# Patient Record
Sex: MALE | Race: BLACK OR AFRICAN AMERICAN | NOT HISPANIC OR LATINO | Employment: OTHER | URBAN - METROPOLITAN AREA
[De-identification: names, ages, dates, MRNs, and addresses within clinical notes are randomized per-mention and may not be internally consistent; named-entity substitution may affect disease eponyms.]

---

## 2019-01-16 ENCOUNTER — HOSPITAL ENCOUNTER (INPATIENT)
Facility: HOSPITAL | Age: 29
LOS: 5 days | Discharge: NURSING, REHAB OR LONG-TERM CARE | DRG: 439 | End: 2019-01-21
Attending: INTERNAL MEDICINE | Admitting: INTERNAL MEDICINE
Payer: MEDICARE

## 2019-01-16 DIAGNOSIS — K80.50 COMMON BILE DUCT STONE: Primary | ICD-10-CM

## 2019-01-16 DIAGNOSIS — K85.90 ACUTE PANCREATITIS: ICD-10-CM

## 2019-01-16 PROBLEM — E83.111 IRON OVERLOAD DUE TO REPEATED RED BLOOD CELL TRANSFUSIONS: Status: ACTIVE | Noted: 2019-01-16

## 2019-01-16 PROBLEM — D57.1 SICKLE CELL DISEASE WITHOUT CRISIS: Status: ACTIVE | Noted: 2019-01-16

## 2019-01-16 PROBLEM — M1A.09X0 CHRONIC GOUT OF MULTIPLE SITES: Status: ACTIVE | Noted: 2019-01-16

## 2019-01-16 PROBLEM — N17.9 ACUTE RENAL FAILURE SUPERIMPOSED ON STAGE 3 CHRONIC KIDNEY DISEASE: Status: ACTIVE | Noted: 2019-01-16

## 2019-01-16 PROBLEM — N18.30 ACUTE RENAL FAILURE SUPERIMPOSED ON STAGE 3 CHRONIC KIDNEY DISEASE: Status: ACTIVE | Noted: 2019-01-16

## 2019-01-16 PROCEDURE — 11000001 HC ACUTE MED/SURG PRIVATE ROOM

## 2019-01-16 NOTE — PLAN OF CARE
[facility] [employer] [facility] [employer] admissions ONLY: Please call extension [phone] upon patient arrival to floor for Hospital Medicine admit team assignment and for additional admit orders for the patient. Do not page the attending, staff physician or Advanced Practice Provider with the patient on arrival (may not be in-house at the time of arrival). Call back or wait to leave beeper number when prompted.    Outside Transfer Acceptance Note       Patients name/MRN: Ambrosio Newman/MRN [id number]     Referring Physician or Mid-Level provider/Clinic giving report: Dr. Cuauhtemoc Posada (Hospital Medicine), Phone number [phone]    Referral Facility: [facility]    Date of Acceptance: 1/16/2019    Code status: Full code     Accepting Physician for admission to hospital: Carlotta Laurent MD    Consulting Physicians from [facility] involved in case: Dr. Dickson Coto ([facility]) [facility]    Reason for transfer request: Needs ERCP; Failed ERCP at outside facility     Report from Physician/Mid-Level Provider/HPI: 29 y/o male with sickle cell anemia, CKD stage 3 (baseline Cr 2.5-2.6), gout and iron overload admitted on 1/13/2019 with acute pancreatitis with grossly elevated lipase and LFTs and LAURENCE with Cr 4. Patient started on IVFs and currently on LR at 125 cc/hr and IV Zosyn. CT scan showed gallstones in CBD and acute pancreatitis but I was told no necrosis noted on MRCP showed CBD stone. ERCP was done yesterday on 1/5 but unable to cannulate CBD so unsuccessful. Nephrology following patient as Cr not improving and up to 7 today so decided to start HD today and temporary line placed and patient currently receiving first dialysis. Patient never on dialysis before. Patient is otherwise clinically stable. Dr. Coto from [facility] consulted and okay for transfer for [facility] and felt [facility] was best campus for patient. Patient has very high T. brie so concern about if all related to CBD obstruction vs. related to his  Sickle cell disease. Patient stable on med/surg floor. Patient NPO and still having epigastric pain related to pancreatitis.     VS: BP: 110/63 P: 93 T: 97.9 F  RR: 16  Pulse Ox: 93% room air; 69.3 kg    Past Medical History: Sickle Cell anemia, CKD Stage 3, Iron overload related to frequent blood transfusions for SS disease, gout    LABS:         T. brie 36.6  Plt 197  INR 1.2  Cr 7.0  Hgb 8.3 and reported as stable over past 3 days    Imaging: MRCP as per Dr. Posada showed CBD stone but no acute cholecystitis    To Do List upon arrival:     Consult AES for ERCP   NPO   Pain control   Consult Nephrology for dialysis needs    May need Heme/Onc if bilirubin does not improve with relieving CBD obstruction    Carlotta Laurent MD  Department of Hospital Medicine  [facility]/   [phone]

## 2019-01-17 ENCOUNTER — ANESTHESIA (OUTPATIENT)
Dept: ENDOSCOPY | Facility: HOSPITAL | Age: 29
DRG: 439 | End: 2019-01-17
Payer: MEDICARE

## 2019-01-17 ENCOUNTER — ANESTHESIA EVENT (OUTPATIENT)
Dept: ENDOSCOPY | Facility: HOSPITAL | Age: 29
DRG: 439 | End: 2019-01-17
Payer: MEDICARE

## 2019-01-17 PROBLEM — N18.4 ACUTE RENAL FAILURE SUPERIMPOSED ON STAGE 4 CHRONIC KIDNEY DISEASE: Status: ACTIVE | Noted: 2019-01-16

## 2019-01-17 LAB
ALBUMIN SERPL BCP-MCNC: 2.3 G/DL
ALBUMIN SERPL BCP-MCNC: 2.4 G/DL
ALBUMIN SERPL BCP-MCNC: 2.6 G/DL
ALP SERPL-CCNC: 392 U/L
ALP SERPL-CCNC: 397 U/L
ALT SERPL W/O P-5'-P-CCNC: 123 U/L
ALT SERPL W/O P-5'-P-CCNC: 133 U/L
ANION GAP SERPL CALC-SCNC: 14 MMOL/L
ANION GAP SERPL CALC-SCNC: 18 MMOL/L
ANISOCYTOSIS BLD QL SMEAR: ABNORMAL
ANISOCYTOSIS BLD QL SMEAR: SLIGHT
AST SERPL-CCNC: 141 U/L
AST SERPL-CCNC: 144 U/L
BASO STIPL BLD QL SMEAR: ABNORMAL
BASOPHILS # BLD AUTO: 0.12 K/UL
BASOPHILS # BLD AUTO: 0.13 K/UL
BASOPHILS NFR BLD: 0.5 %
BASOPHILS NFR BLD: 0.6 %
BILIRUB DIRECT SERPL-MCNC: >14 MG/DL
BILIRUB DIRECT SERPL-MCNC: >14 MG/DL
BILIRUB SERPL-MCNC: 46.3 MG/DL
BILIRUB SERPL-MCNC: 46.5 MG/DL
BUN SERPL-MCNC: 45 MG/DL
BUN SERPL-MCNC: 53 MG/DL
CALCIUM SERPL-MCNC: 9.4 MG/DL
CALCIUM SERPL-MCNC: 9.5 MG/DL
CHLORIDE SERPL-SCNC: 102 MMOL/L
CHLORIDE SERPL-SCNC: 99 MMOL/L
CO2 SERPL-SCNC: 18 MMOL/L
CO2 SERPL-SCNC: 22 MMOL/L
CREAT SERPL-MCNC: 4.7 MG/DL
CREAT SERPL-MCNC: 5.7 MG/DL
DIFFERENTIAL METHOD: ABNORMAL
DIFFERENTIAL METHOD: ABNORMAL
EOSINOPHIL # BLD AUTO: 0.5 K/UL
EOSINOPHIL # BLD AUTO: 0.6 K/UL
EOSINOPHIL NFR BLD: 2.2 %
EOSINOPHIL NFR BLD: 2.6 %
ERYTHROCYTE [DISTWIDTH] IN BLOOD BY AUTOMATED COUNT: 19.3 %
ERYTHROCYTE [DISTWIDTH] IN BLOOD BY AUTOMATED COUNT: 19.5 %
EST. GFR  (AFRICAN AMERICAN): 14.4 ML/MIN/1.73 M^2
EST. GFR  (AFRICAN AMERICAN): 18.2 ML/MIN/1.73 M^2
EST. GFR  (NON AFRICAN AMERICAN): 12.4 ML/MIN/1.73 M^2
EST. GFR  (NON AFRICAN AMERICAN): 15.7 ML/MIN/1.73 M^2
ESTIMATED AVG GLUCOSE: 94 MG/DL
GLUCOSE SERPL-MCNC: 86 MG/DL
GLUCOSE SERPL-MCNC: 97 MG/DL
HAPTOGLOB SERPL-MCNC: <10 MG/DL
HBA1C MFR BLD HPLC: 4.9 %
HCT VFR BLD AUTO: 20.5 %
HCT VFR BLD AUTO: 21.5 %
HGB BLD-MCNC: 7.3 G/DL
HGB BLD-MCNC: 7.9 G/DL
HOWELL-JOLLY BOD BLD QL SMEAR: ABNORMAL
HYPOCHROMIA BLD QL SMEAR: ABNORMAL
HYPOCHROMIA BLD QL SMEAR: ABNORMAL
IMM GRANULOCYTES # BLD AUTO: 0.21 K/UL
IMM GRANULOCYTES # BLD AUTO: 0.21 K/UL
IMM GRANULOCYTES NFR BLD AUTO: 0.9 %
IMM GRANULOCYTES NFR BLD AUTO: 0.9 %
INR PPP: 1.2
LACTATE SERPL-SCNC: 0.5 MMOL/L
LDH SERPL L TO P-CCNC: 447 U/L
LIPASE SERPL-CCNC: 57 U/L
LYMPHOCYTES # BLD AUTO: 1 K/UL
LYMPHOCYTES # BLD AUTO: 1 K/UL
LYMPHOCYTES NFR BLD: 4.1 %
LYMPHOCYTES NFR BLD: 4.3 %
MAGNESIUM SERPL-MCNC: 1.4 MG/DL
MCH RBC QN AUTO: 28.2 PG
MCH RBC QN AUTO: 29 PG
MCHC RBC AUTO-ENTMCNC: 35.6 G/DL
MCHC RBC AUTO-ENTMCNC: 36.7 G/DL
MCV RBC AUTO: 79 FL
MCV RBC AUTO: 79 FL
MONOCYTES # BLD AUTO: 3.3 K/UL
MONOCYTES # BLD AUTO: 3.6 K/UL
MONOCYTES NFR BLD: 14 %
MONOCYTES NFR BLD: 15.7 %
NEUTROPHILS # BLD AUTO: 17.6 K/UL
NEUTROPHILS # BLD AUTO: 18.4 K/UL
NEUTROPHILS NFR BLD: 76.3 %
NEUTROPHILS NFR BLD: 77.9 %
NRBC BLD-RTO: 3 /100 WBC
NRBC BLD-RTO: 3 /100 WBC
OVALOCYTES BLD QL SMEAR: ABNORMAL
PAPPENHEIMER BOD BLD QL SMEAR: PRESENT
PAPPENHEIMER BOD BLD QL SMEAR: PRESENT
PHOSPHATE SERPL-MCNC: 3.9 MG/DL
PHOSPHATE SERPL-MCNC: 4.4 MG/DL
PLATELET # BLD AUTO: 208 K/UL
PLATELET # BLD AUTO: 233 K/UL
PLATELET BLD QL SMEAR: ABNORMAL
PMV BLD AUTO: 10.8 FL
PMV BLD AUTO: 11.1 FL
POIKILOCYTOSIS BLD QL SMEAR: ABNORMAL
POIKILOCYTOSIS BLD QL SMEAR: ABNORMAL
POLYCHROMASIA BLD QL SMEAR: ABNORMAL
POLYCHROMASIA BLD QL SMEAR: ABNORMAL
POTASSIUM SERPL-SCNC: 4.8 MMOL/L
POTASSIUM SERPL-SCNC: 4.9 MMOL/L
PROT SERPL-MCNC: 5.6 G/DL
PROT SERPL-MCNC: 6 G/DL
PROTHROMBIN TIME: 11.9 SEC
RBC # BLD AUTO: 2.59 M/UL
RBC # BLD AUTO: 2.72 M/UL
RETICS/RBC NFR AUTO: 5 %
SCHISTOCYTES BLD QL SMEAR: ABNORMAL
SCHISTOCYTES BLD QL SMEAR: PRESENT
SICKLE CELLS BLD QL SMEAR: ABNORMAL
SICKLE CELLS BLD QL SMEAR: ABNORMAL
SODIUM SERPL-SCNC: 135 MMOL/L
SODIUM SERPL-SCNC: 138 MMOL/L
SPHEROCYTES BLD QL SMEAR: ABNORMAL
TARGETS BLD QL SMEAR: ABNORMAL
TARGETS BLD QL SMEAR: ABNORMAL
WBC # BLD AUTO: 23.04 K/UL
WBC # BLD AUTO: 23.65 K/UL

## 2019-01-17 PROCEDURE — 99223 PR INITIAL HOSPITAL CARE,LEVL III: ICD-10-PCS | Mod: GC,,, | Performed by: INTERNAL MEDICINE

## 2019-01-17 PROCEDURE — 85610 PROTHROMBIN TIME: CPT

## 2019-01-17 PROCEDURE — 27202127 HC STENT INTRODUCER: Performed by: INTERNAL MEDICINE

## 2019-01-17 PROCEDURE — C1769 GUIDE WIRE: HCPCS | Performed by: INTERNAL MEDICINE

## 2019-01-17 PROCEDURE — 87040 BLOOD CULTURE FOR BACTERIA: CPT | Mod: 59

## 2019-01-17 PROCEDURE — 43259 EGD US EXAM DUODENUM/JEJUNUM: CPT | Performed by: INTERNAL MEDICINE

## 2019-01-17 PROCEDURE — 82248 BILIRUBIN DIRECT: CPT

## 2019-01-17 PROCEDURE — 74328 X-RAY BILE DUCT ENDOSCOPY: CPT | Mod: 26,,, | Performed by: INTERNAL MEDICINE

## 2019-01-17 PROCEDURE — 83605 ASSAY OF LACTIC ACID: CPT

## 2019-01-17 PROCEDURE — 83036 HEMOGLOBIN GLYCOSYLATED A1C: CPT

## 2019-01-17 PROCEDURE — 43274 PR ERCP W/STENT PLCMNT BILIARY/PANCREATIC DUCT: ICD-10-PCS | Mod: ,,, | Performed by: INTERNAL MEDICINE

## 2019-01-17 PROCEDURE — 83010 ASSAY OF HAPTOGLOBIN QUANT: CPT

## 2019-01-17 PROCEDURE — 27201674 HC SPHINCTERTOME: Performed by: INTERNAL MEDICINE

## 2019-01-17 PROCEDURE — 74328 X-RAY BILE DUCT ENDOSCOPY: CPT | Performed by: INTERNAL MEDICINE

## 2019-01-17 PROCEDURE — 27000221 HC OXYGEN, UP TO 24 HOURS

## 2019-01-17 PROCEDURE — 85025 COMPLETE CBC W/AUTO DIFF WBC: CPT

## 2019-01-17 PROCEDURE — 80076 HEPATIC FUNCTION PANEL: CPT

## 2019-01-17 PROCEDURE — 83735 ASSAY OF MAGNESIUM: CPT

## 2019-01-17 PROCEDURE — 80069 RENAL FUNCTION PANEL: CPT

## 2019-01-17 PROCEDURE — 74328 PR  X-RAY FOR BILE DUCT ENDOSCOPY: ICD-10-PCS | Mod: 26,,, | Performed by: INTERNAL MEDICINE

## 2019-01-17 PROCEDURE — 99223 1ST HOSP IP/OBS HIGH 75: CPT | Mod: AI,GC,, | Performed by: INTERNAL MEDICINE

## 2019-01-17 PROCEDURE — C2617 STENT, NON-COR, TEM W/O DEL: HCPCS | Performed by: INTERNAL MEDICINE

## 2019-01-17 PROCEDURE — 99223 PR INITIAL HOSPITAL CARE,LEVL III: ICD-10-PCS | Mod: AI,GC,, | Performed by: INTERNAL MEDICINE

## 2019-01-17 PROCEDURE — 84100 ASSAY OF PHOSPHORUS: CPT

## 2019-01-17 PROCEDURE — 43259 PR ENDOSCOPIC ULTRASOUND EXAM: ICD-10-PCS | Mod: 51,,, | Performed by: INTERNAL MEDICINE

## 2019-01-17 PROCEDURE — 63600175 PHARM REV CODE 636 W HCPCS: Performed by: STUDENT IN AN ORGANIZED HEALTH CARE EDUCATION/TRAINING PROGRAM

## 2019-01-17 PROCEDURE — 43264 ERCP REMOVE DUCT CALCULI: CPT | Mod: 51,,, | Performed by: INTERNAL MEDICINE

## 2019-01-17 PROCEDURE — 99223 PR INITIAL HOSPITAL CARE,LEVL III: ICD-10-PCS | Mod: 25,GC,, | Performed by: INTERNAL MEDICINE

## 2019-01-17 PROCEDURE — 80053 COMPREHEN METABOLIC PANEL: CPT

## 2019-01-17 PROCEDURE — 83690 ASSAY OF LIPASE: CPT

## 2019-01-17 PROCEDURE — 99223 1ST HOSP IP/OBS HIGH 75: CPT | Mod: 25,GC,, | Performed by: INTERNAL MEDICINE

## 2019-01-17 PROCEDURE — 11000001 HC ACUTE MED/SURG PRIVATE ROOM

## 2019-01-17 PROCEDURE — 36415 COLL VENOUS BLD VENIPUNCTURE: CPT

## 2019-01-17 PROCEDURE — 43274 ERCP DUCT STENT PLACEMENT: CPT | Mod: ,,, | Performed by: INTERNAL MEDICINE

## 2019-01-17 PROCEDURE — D9220A PRA ANESTHESIA: ICD-10-PCS | Mod: ANES,,, | Performed by: ANESTHESIOLOGY

## 2019-01-17 PROCEDURE — 37000009 HC ANESTHESIA EA ADD 15 MINS: Performed by: INTERNAL MEDICINE

## 2019-01-17 PROCEDURE — 99223 1ST HOSP IP/OBS HIGH 75: CPT | Mod: GC,,, | Performed by: INTERNAL MEDICINE

## 2019-01-17 PROCEDURE — 25000003 PHARM REV CODE 250: Performed by: NURSE ANESTHETIST, CERTIFIED REGISTERED

## 2019-01-17 PROCEDURE — 37000008 HC ANESTHESIA 1ST 15 MINUTES: Performed by: INTERNAL MEDICINE

## 2019-01-17 PROCEDURE — 83615 LACTATE (LD) (LDH) ENZYME: CPT

## 2019-01-17 PROCEDURE — 27202125 HC BALLOON, EXTRACTION (ANY): Performed by: INTERNAL MEDICINE

## 2019-01-17 PROCEDURE — 25000003 PHARM REV CODE 250: Performed by: STUDENT IN AN ORGANIZED HEALTH CARE EDUCATION/TRAINING PROGRAM

## 2019-01-17 PROCEDURE — 63600175 PHARM REV CODE 636 W HCPCS: Performed by: NURSE ANESTHETIST, CERTIFIED REGISTERED

## 2019-01-17 PROCEDURE — 43264 ERCP REMOVE DUCT CALCULI: CPT | Performed by: INTERNAL MEDICINE

## 2019-01-17 PROCEDURE — D9220A PRA ANESTHESIA: ICD-10-PCS | Mod: CRNA,,, | Performed by: NURSE ANESTHETIST, CERTIFIED REGISTERED

## 2019-01-17 PROCEDURE — 43259 EGD US EXAM DUODENUM/JEJUNUM: CPT | Mod: 51,,, | Performed by: INTERNAL MEDICINE

## 2019-01-17 PROCEDURE — 43274 ERCP DUCT STENT PLACEMENT: CPT | Performed by: INTERNAL MEDICINE

## 2019-01-17 PROCEDURE — 43264 PR ERCP,W/REMOVAL STONE,BIL/PANCR DUCTS: ICD-10-PCS | Mod: 51,,, | Performed by: INTERNAL MEDICINE

## 2019-01-17 PROCEDURE — 85045 AUTOMATED RETICULOCYTE COUNT: CPT

## 2019-01-17 PROCEDURE — D9220A PRA ANESTHESIA: Mod: CRNA,,, | Performed by: NURSE ANESTHETIST, CERTIFIED REGISTERED

## 2019-01-17 PROCEDURE — D9220A PRA ANESTHESIA: Mod: ANES,,, | Performed by: ANESTHESIOLOGY

## 2019-01-17 RX ORDER — HYDROCODONE BITARTRATE AND ACETAMINOPHEN 10; 325 MG/1; MG/1
1 TABLET ORAL EVERY 6 HOURS PRN
Status: DISCONTINUED | OUTPATIENT
Start: 2019-01-17 | End: 2019-01-22 | Stop reason: HOSPADM

## 2019-01-17 RX ORDER — FEBUXOSTAT 40 MG/1
40 TABLET, FILM COATED ORAL DAILY
Status: ON HOLD | COMMUNITY
End: 2019-01-21 | Stop reason: HOSPADM

## 2019-01-17 RX ORDER — FOLIC ACID 1 MG/1
1 TABLET ORAL DAILY
Status: ON HOLD | COMMUNITY
End: 2019-01-21 | Stop reason: HOSPADM

## 2019-01-17 RX ORDER — ALLOPURINOL 100 MG/1
100 TABLET ORAL DAILY
Status: DISCONTINUED | OUTPATIENT
Start: 2019-01-17 | End: 2019-01-22 | Stop reason: HOSPADM

## 2019-01-17 RX ORDER — GLUCAGON 1 MG
1 KIT INJECTION
Status: DISCONTINUED | OUTPATIENT
Start: 2019-01-17 | End: 2019-01-22 | Stop reason: HOSPADM

## 2019-01-17 RX ORDER — FUROSEMIDE 40 MG/1
40 TABLET ORAL DAILY
Status: ON HOLD | COMMUNITY
End: 2019-01-21 | Stop reason: HOSPADM

## 2019-01-17 RX ORDER — ONDANSETRON HYDROCHLORIDE 2 MG/ML
INJECTION, SOLUTION INTRAMUSCULAR; INTRAVENOUS
Status: DISCONTINUED | OUTPATIENT
Start: 2019-01-17 | End: 2019-01-17

## 2019-01-17 RX ORDER — IBUPROFEN 200 MG
24 TABLET ORAL
Status: DISCONTINUED | OUTPATIENT
Start: 2019-01-17 | End: 2019-01-22 | Stop reason: HOSPADM

## 2019-01-17 RX ORDER — DEFERASIROX 500 MG/1
20 TABLET, FOR SUSPENSION ORAL
Status: DISCONTINUED | OUTPATIENT
Start: 2019-01-17 | End: 2019-01-17

## 2019-01-17 RX ORDER — PROPOFOL 10 MG/ML
VIAL (ML) INTRAVENOUS
Status: DISCONTINUED | OUTPATIENT
Start: 2019-01-17 | End: 2019-01-17

## 2019-01-17 RX ORDER — LANOLIN ALCOHOL/MO/W.PET/CERES
800 CREAM (GRAM) TOPICAL 2 TIMES DAILY
Status: COMPLETED | OUTPATIENT
Start: 2019-01-17 | End: 2019-01-19

## 2019-01-17 RX ORDER — OXYCODONE AND ACETAMINOPHEN 5; 325 MG/1; MG/1
1 TABLET ORAL EVERY 4 HOURS PRN
Status: ON HOLD | COMMUNITY
End: 2019-01-17

## 2019-01-17 RX ORDER — ONDANSETRON 8 MG/1
8 TABLET, ORALLY DISINTEGRATING ORAL EVERY 8 HOURS PRN
Status: DISCONTINUED | OUTPATIENT
Start: 2019-01-17 | End: 2019-01-22 | Stop reason: HOSPADM

## 2019-01-17 RX ORDER — IBUPROFEN 200 MG
16 TABLET ORAL
Status: DISCONTINUED | OUTPATIENT
Start: 2019-01-17 | End: 2019-01-22 | Stop reason: HOSPADM

## 2019-01-17 RX ORDER — HYDROCODONE BITARTRATE AND ACETAMINOPHEN 5; 325 MG/1; MG/1
1 TABLET ORAL EVERY 6 HOURS PRN
Status: DISCONTINUED | OUTPATIENT
Start: 2019-01-17 | End: 2019-01-22 | Stop reason: HOSPADM

## 2019-01-17 RX ORDER — ALLOPURINOL 100 MG/1
100 TABLET ORAL DAILY
Status: ON HOLD | COMMUNITY
End: 2019-01-18

## 2019-01-17 RX ORDER — PHENYLEPHRINE HYDROCHLORIDE 10 MG/ML
INJECTION INTRAVENOUS
Status: DISCONTINUED | OUTPATIENT
Start: 2019-01-17 | End: 2019-01-17

## 2019-01-17 RX ORDER — CARVEDILOL 12.5 MG/1
12.5 TABLET ORAL DAILY
Status: ON HOLD | COMMUNITY
End: 2019-01-21 | Stop reason: HOSPADM

## 2019-01-17 RX ORDER — FLUTICASONE PROPIONATE 50 MCG
1 SPRAY, SUSPENSION (ML) NASAL DAILY
Status: ON HOLD | COMMUNITY
End: 2019-01-21 | Stop reason: HOSPADM

## 2019-01-17 RX ORDER — COLCHICINE 0.6 MG/1
0.6 TABLET ORAL DAILY
Status: ON HOLD | COMMUNITY
End: 2019-01-21 | Stop reason: HOSPADM

## 2019-01-17 RX ORDER — DIPHENHYDRAMINE HCL 25 MG
25 CAPSULE ORAL 2 TIMES DAILY PRN
Status: ON HOLD | COMMUNITY
End: 2019-01-21 | Stop reason: HOSPADM

## 2019-01-17 RX ORDER — LIDOCAINE HCL/PF 100 MG/5ML
SYRINGE (ML) INTRAVENOUS
Status: DISCONTINUED | OUTPATIENT
Start: 2019-01-17 | End: 2019-01-17

## 2019-01-17 RX ORDER — CALCITRIOL 0.25 UG/1
CAPSULE ORAL
Status: ON HOLD | COMMUNITY
End: 2019-01-21 | Stop reason: HOSPADM

## 2019-01-17 RX ORDER — OXYCODONE AND ACETAMINOPHEN 10; 325 MG/1; MG/1
1 TABLET ORAL EVERY 4 HOURS PRN
Status: ON HOLD | COMMUNITY
End: 2019-01-17

## 2019-01-17 RX ORDER — SODIUM CHLORIDE, SODIUM LACTATE, POTASSIUM CHLORIDE, CALCIUM CHLORIDE 600; 310; 30; 20 MG/100ML; MG/100ML; MG/100ML; MG/100ML
INJECTION, SOLUTION INTRAVENOUS CONTINUOUS
Status: DISCONTINUED | OUTPATIENT
Start: 2019-01-17 | End: 2019-01-17

## 2019-01-17 RX ORDER — SODIUM CHLORIDE 0.9 % (FLUSH) 0.9 %
3 SYRINGE (ML) INJECTION
Status: DISCONTINUED | OUTPATIENT
Start: 2019-01-17 | End: 2019-01-17 | Stop reason: HOSPADM

## 2019-01-17 RX ORDER — IBUPROFEN 200 MG
400 TABLET ORAL 2 TIMES DAILY PRN
Status: ON HOLD | COMMUNITY
End: 2019-01-21 | Stop reason: HOSPADM

## 2019-01-17 RX ORDER — SODIUM CHLORIDE 9 MG/ML
INJECTION, SOLUTION INTRAVENOUS CONTINUOUS PRN
Status: DISCONTINUED | OUTPATIENT
Start: 2019-01-17 | End: 2019-01-17

## 2019-01-17 RX ORDER — AMOXICILLIN 250 MG
1 CAPSULE ORAL 2 TIMES DAILY
Status: DISCONTINUED | OUTPATIENT
Start: 2019-01-17 | End: 2019-01-22 | Stop reason: HOSPADM

## 2019-01-17 RX ORDER — ONDANSETRON 8 MG/1
8 TABLET, ORALLY DISINTEGRATING ORAL
Status: ON HOLD | COMMUNITY
End: 2019-01-17

## 2019-01-17 RX ORDER — DEFERASIROX 125 MG/1
125 TABLET, FOR SUSPENSION ORAL
Status: ON HOLD | COMMUNITY
End: 2019-01-21 | Stop reason: HOSPADM

## 2019-01-17 RX ORDER — FENTANYL CITRATE 50 UG/ML
INJECTION, SOLUTION INTRAMUSCULAR; INTRAVENOUS
Status: DISCONTINUED | OUTPATIENT
Start: 2019-01-17 | End: 2019-01-17

## 2019-01-17 RX ORDER — SUCCINYLCHOLINE CHLORIDE 20 MG/ML
INJECTION INTRAMUSCULAR; INTRAVENOUS
Status: DISCONTINUED | OUTPATIENT
Start: 2019-01-17 | End: 2019-01-17

## 2019-01-17 RX ORDER — SODIUM CHLORIDE 0.9 % (FLUSH) 0.9 %
5 SYRINGE (ML) INJECTION
Status: DISCONTINUED | OUTPATIENT
Start: 2019-01-17 | End: 2019-01-22 | Stop reason: HOSPADM

## 2019-01-17 RX ORDER — RAMELTEON 8 MG/1
8 TABLET ORAL NIGHTLY PRN
Status: DISCONTINUED | OUTPATIENT
Start: 2019-01-17 | End: 2019-01-22 | Stop reason: HOSPADM

## 2019-01-17 RX ADMIN — PIPERACILLIN AND TAZOBACTAM 4.5 G: 4; .5 INJECTION, POWDER, LYOPHILIZED, FOR SOLUTION INTRAVENOUS; PARENTERAL at 04:01

## 2019-01-17 RX ADMIN — SUCCINYLCHOLINE CHLORIDE 140 MG: 20 INJECTION, SOLUTION INTRAMUSCULAR; INTRAVENOUS at 11:01

## 2019-01-17 RX ADMIN — PROPOFOL 140 MG: 10 INJECTION, EMULSION INTRAVENOUS at 11:01

## 2019-01-17 RX ADMIN — STANDARDIZED SENNA CONCENTRATE AND DOCUSATE SODIUM 1 TABLET: 8.6; 5 TABLET, FILM COATED ORAL at 09:01

## 2019-01-17 RX ADMIN — HYDROCODONE BITARTRATE AND ACETAMINOPHEN 1 TABLET: 10; 325 TABLET ORAL at 11:01

## 2019-01-17 RX ADMIN — MAGNESIUM OXIDE TAB 400 MG (241.3 MG ELEMENTAL MG) 800 MG: 400 (241.3 MG) TAB at 09:01

## 2019-01-17 RX ADMIN — SODIUM CHLORIDE, SODIUM LACTATE, POTASSIUM CHLORIDE, AND CALCIUM CHLORIDE: .6; .31; .03; .02 INJECTION, SOLUTION INTRAVENOUS at 01:01

## 2019-01-17 RX ADMIN — SODIUM CHLORIDE: 9 INJECTION, SOLUTION INTRAVENOUS at 11:01

## 2019-01-17 RX ADMIN — HYDROCODONE BITARTRATE AND ACETAMINOPHEN 1 TABLET: 10; 325 TABLET ORAL at 01:01

## 2019-01-17 RX ADMIN — HYDROCODONE BITARTRATE AND ACETAMINOPHEN 1 TABLET: 10; 325 TABLET ORAL at 04:01

## 2019-01-17 RX ADMIN — PHENYLEPHRINE HYDROCHLORIDE 150 MCG: 10 INJECTION INTRAVENOUS at 12:01

## 2019-01-17 RX ADMIN — ONDANSETRON 4 MG: 2 INJECTION, SOLUTION INTRAMUSCULAR; INTRAVENOUS at 12:01

## 2019-01-17 RX ADMIN — PHENYLEPHRINE HYDROCHLORIDE 100 MCG: 10 INJECTION INTRAVENOUS at 11:01

## 2019-01-17 RX ADMIN — LIDOCAINE HYDROCHLORIDE 50 MG: 20 INJECTION, SOLUTION INTRAVENOUS at 11:01

## 2019-01-17 RX ADMIN — FENTANYL CITRATE 100 MCG: 50 INJECTION, SOLUTION INTRAMUSCULAR; INTRAVENOUS at 11:01

## 2019-01-17 RX ADMIN — PHENYLEPHRINE HYDROCHLORIDE 150 MCG: 10 INJECTION INTRAVENOUS at 11:01

## 2019-01-17 NOTE — ASSESSMENT & PLAN NOTE
Baseline creatinine reportedly 2.5-2.6, up to 7.0 prior to transfer. Cr uptrending from yesterday. Will continue to monitor.    - nephrology consulted, managing dialysis  - CXR ordered to confirm placement of trialysis catheter in right IJ  - renal labs ordered  - will continue to monitor

## 2019-01-17 NOTE — ASSESSMENT & PLAN NOTE
Patient with gallstone pancreatitis admitted on 1/13 and treated with antibiotics (zosyn) and IV fluids. Currently only has minimal pain and no nausea. Transferred here for AES evaluation.     - pain control with oral agents,   - careful IV fluids given severe oliguria and dialysis requirement. Will continue to monitor fluid requirement  - Continued Zosyn   - blood cultures x2, lactic acid, lipase, CMP ordered  - consulted AES. Pt is s/p biliary stent  - advance diet as tolerated  - continued heparin

## 2019-01-17 NOTE — HPI
Ambrosio Newman is a 28 year old M who was transferred overnight from OSH for gallstone pancreatitis AES evaluation.  MHx includes sickle cell disease, CVA, HTN, gout, and CKD4.  Nephrology was consulted because patient received first time dialysis on 1/16 due to oliguria and increasing Cr, up to 7.0.  His baseline Cr is reportedly ~2.5 and was 4.7 at time of admission.  He remains oliguric w/ 100mL recorded output since arriving, however no AMS or resp distress.  RIJ trialysis is in place.  He has no signs/symptoms of uremia.  He does not appear or sound fluid overloaded on exam, however CXR consistent w/ congestive failure likely due to volume overload.

## 2019-01-17 NOTE — ASSESSMENT & PLAN NOTE
Baseline creatinine reportedly 2.5-2.6, up to 7.0 prior to transfer. Patient very oliguric with ~5cc dark brown urine at bedside.     - urinalysis ordered  - nephrology consulted, will need to call in the a.m.  - CXR ordered to confirm placement of trialysis catheter in right IJ  - no urgent indications for dialysis at time of my exam with no AMS and no respiratory distress  - STAT renal labs ordered

## 2019-01-17 NOTE — ANESTHESIA PREPROCEDURE EVALUATION
01/17/2019  Ambrosio Newman is a 28 y.o., male.    Anesthesia Evaluation         Review of Systems  Hematology/Oncology:        Hematology Comments: Iron overload due to repeated red blood cell transfusions,  Sickle cell disease without crisis   Cardiovascular:   Hypertension   Renal/:   Chronic Renal Disease, ESRD, Dialysis    Hepatic/GI:   Acute pancreatitis    Neurological:   CVA        Physical Exam  General:  Well nourished    Airway/Jaw/Neck:  Airway Findings: Mouth Opening: Normal Tongue: Normal  Mallampati: III  TM Distance: 4 - 6 cm      Dental:  Dental Findings: In tact        Mental Status:  Mental Status Findings:  Cooperative, Alert and Oriented         Anesthesia Plan  Type of Anesthesia, risks & benefits discussed:  Anesthesia Type:  general  Patient's Preference:   Intra-op Monitoring Plan: standard ASA monitors  Intra-op Monitoring Plan Comments:   Post Op Pain Control Plan:   Post Op Pain Control Plan Comments:   Induction:   IV  Beta Blocker:  Patient is not currently on a Beta-Blocker (No further documentation required).       Informed Consent: Patient understands risks and agrees with Anesthesia plan.  Questions answered. Anesthesia consent signed with patient.  ASA Score: 4     Day of Surgery Review of History & Physical:    H&P update referred to the surgeon.         Ready For Surgery From Anesthesia Perspective.

## 2019-01-17 NOTE — [employer] PATIENT INSTRUCTIONS
Discharge Summary/Instructions after an Endoscopic Procedure  Patient Name: Ambrosio Newman  Patient MRN: [id number]  Patient YOB: 1990 Thursday, January 17, 2019  Dickson Coto MD  RESTRICTIONS:  During your procedure today, you received medications for sedation.  These   medications may affect your judgment, balance and coordination.  Therefore,   for 24 hours, you have the following restrictions:   - DO NOT drive a car, operate machinery, make legal/financial decisions,   sign important papers or drink alcohol.    ACTIVITY:  Today: no heavy lifting, straining or running due to procedural   sedation/anesthesia.  The following day: return to full activity including work.  DIET:  Eat and drink normally unless instructed otherwise.     TREATMENT FOR COMMON SIDE EFFECTS:  - Mild abdominal pain, nausea, belching, bloating or excessive gas:  rest,   eat lightly and use a heating pad.  - Sore Throat: treat with throat lozenges and/or gargle with warm salt   water.  - Because air was used during the procedure, expelling large amounts of air   from your rectum or belching is normal.  - If a bowel prep was taken, you may not have a bowel movement for 1-3 days.    This is normal.  SYMPTOMS TO WATCH FOR AND REPORT TO YOUR PHYSICIAN:  1. Abdominal pain or bloating, other than gas cramps.  2. Chest pain.  3. Back pain.  4. Signs of infection such as: chills or fever occurring within 24 hours   after the procedure.  5. Rectal bleeding, which would show as bright red, maroon, or black stools.   (A tablespoon of blood from the rectum is not serious, especially if   hemorrhoids are present.)  6. Vomiting.  7. Weakness or dizziness.  GO DIRECTLY TO THE NEAREST EMERGENCY ROOM IF YOU HAVE ANY OF THE FOLLOWING:      Difficulty breathing              Chills and/or fever over 101 F   Persistent vomiting and/or vomiting blood   Severe abdominal pain   Severe chest pain   Black, tarry stools   Bleeding- more than one  tablespoon   Any other symptom or condition that you feel may need urgent attention  Your doctor recommends these additional instructions:  If any biopsies were taken, your doctors clinic will contact you in 1 to 2   weeks with any results.  - Return patient to hospital arce for ongoing care.   - Perform an ERCP today.  For questions, problems or results please call your physician - Dickson Coto MD at Work:  [phone].  [facility], EMERGENCY ROOM PHONE NUMBER: [phone]  IF A COMPLICATION OR EMERGENCY SITUATION ARISES AND YOU ARE UNABLE TO REACH   YOUR PHYSICIAN - GO DIRECTLY TO THE EMERGENCY ROOM.  Dickson Coto MD  1/17/2019 12:43:30 PM  This report has been verified and signed electronically.  [employer]

## 2019-01-17 NOTE — HPI
Ambrosio Newman is a 28 y.o. 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 He was admitted to [facility] on 1/13 with complaint of epigastric abdominal pain, nausea, vomiting, and chills. He was found to have lipase of 1123, and finding of cholelithiasis as well as stone in distal common duct on US of abdomen. Patient reports that he had laparoscopic cholecystectomy at another hospital last year but imaging showed remnant gallbladder with stones. He was treated with IV Zosyn while at [facility] and had CT abdomen, which showed gallstones in gallbladder, surgically absent spleen, and normal appearance of pancreas. MRCP was performed on 1/14 with finding of dilated common bile duct (8mm), diffuse intrahepatic and extrahepatic biliary dilatation, and small common duct stone at distal common duct. He also had LAURENCE on CKD and required HD. Patient lives at home with his mother and is unemployed. He denies any tobacco, alcohol, or drug use.

## 2019-01-17 NOTE — TRANSFER OF CARE
"Anesthesia Transfer of Care Note    Patient: Ambrosio Newman    Procedure(s) Performed: Procedure(s) (LRB):  ULTRASOUND, ENDOSCOPIC, UPPER GI TRACT (N/A)  ERCP (ENDOSCOPIC RETROGRADE CHOLANGIOPANCREATOGRAPHY) (N/A)    Patient location: PACU    Anesthesia Type: general    Transport from OR: Transported from OR on 6-10 L/min O2 by face mask with adequate spontaneous ventilation    Post pain: adequate analgesia    Post assessment: no apparent anesthetic complications and tolerated procedure well    Post vital signs: stable    Level of consciousness: awake and responds to stimulation    Nausea/Vomiting: no nausea/vomiting    Complications: none    Transfer of care protocol was followed      Last vitals:   Visit Vitals  BP (!) 145/76 (BP Location: Right arm, Patient Position: Lying)   Pulse 87   Temp 37.5 °C (99.5 °F) (Temporal)   Resp 20   Ht 5' 6" (1.676 m)   Wt 72.2 kg (159 lb 2 oz)   SpO2 (!) 90%   BMI 25.68 kg/m²     "

## 2019-01-17 NOTE — H&P
[facility] Hospital Medicine  History & Physical    Patient Name: Ambrosio Newman  MRN: [id number]  Admission Date: 1/16/2019  Attending Physician: Callie Grace MD   Primary Care Provider: Primary Doctor [facility] Medicine Team: Networked reference to record PCT  María Elena Manzo DO     Patient information was obtained from patient, past medical records and ER records.     Subjective:     Principal Problem:Acute pancreatitis    Chief Complaint: No chief complaint on file.       HPI: Mr. 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     Mr. Newman was admitted to [facility] on 1/13 with complaint of epigastric abdominal pain, nausea, vomiting, and chills. He was found to have lipase of 1123, and finding of cholelithiasis as well as stone in distal common duct on US of abdomen. Patient reports that he had laparoscopic cholecystectomy at another hospital last year. Mr. Newman was treated with IV Zosyn while at [facility] and had CT abdomen, which showed gallstones in gallbladder, surgically absent spleen, and normal appearance of pancreas. MRCP was performed on 1/14 with finding of dilated common bile duct (8mm), diffuse intrahepatic and extrahepatic biliary dilatation, and small common duct stone at distal common duct.     During his hospitalization, Mr. Newman was started on dialysis for the first time on 1/16 due to oliguria and rising creatinine (up to 7.0). He had trialysis catheter placed in right IJ.     Mr. Newman currently complains of back pain and mild epigastric abdominal pain. He denies chest pain, shortness of breath, cough, or nausea at this time. He has been NPO for several days and now complains of thirst. He denies  fevers. Patient lives at home with his mother and is unemployed. He denies any tobacco, alcohol, or drug use.     Per Dr. Laurent's transfer note:      LABS:         T. brie 36.6  Plt 197  INR 1.2  Cr 7.0  Hgb 8.3 and reported as stable over past 3 days        Past Medical History:   Diagnosis Date    Chronic gout of multiple sites 1/16/2019    CKD (chronic kidney disease), stage IV     CVA (cerebral vascular accident)     Gout     HTN (hypertension)     Iron overload     Iron overload due to repeated red blood cell transfusions 1/16/2019    Sickle cell anemia     Sickle cell disease without crisis 1/16/2019       Past Surgical History:   Procedure Laterality Date    CHOLECYSTECTOMY      ruptured gastric ulcer with amy patch      TONSILLECTOMY         Review of patient's allergies indicates:  No Known Allergies    No current facility-administered medications on file prior to encounter.      No current outpatient medications on file prior to encounter.     Family History     None        Tobacco Use    Smoking status: Never Smoker   Substance and Sexual Activity    Alcohol use: No     Frequency: Never    Drug use: No    Sexual activity: Not on file     Review of Systems   Constitutional: Negative for chills, fatigue, fever and unexpected weight change.   HENT: Negative for rhinorrhea and sore throat.    Eyes: Negative for pain and redness.   Respiratory: Negative for cough and shortness of breath.    Cardiovascular: Negative for chest pain and palpitations.   Gastrointestinal: Positive for abdominal pain. Negative for constipation, diarrhea, nausea and vomiting.   Endocrine: Negative for polydipsia and polyuria.   Genitourinary: Positive for decreased urine volume.   Musculoskeletal: Positive for back pain. Negative for neck pain.   Skin: Negative for color change and rash.   Neurological: Negative for light-headedness and headaches.   Hematological: Negative for adenopathy. Does  not bruise/bleed easily.   Psychiatric/Behavioral: Negative for confusion. The patient is not nervous/anxious.      Objective:     Vital Signs (Most Recent):  Temp: 97.4 °F (36.3 °C) (01/16/19 2300)  Pulse: 100 (01/16/19 2300)  Resp: 18 (01/16/19 2300)  BP: 124/74 (01/16/19 2300)  SpO2: (!) 92 % (01/16/19 2300) Vital Signs (24h Range):  Temp:  [97.4 °F (36.3 °C)-97.9 °F (36.6 °C)] 97.4 °F (36.3 °C)  Pulse:  [] 100  Resp:  [18] 18  SpO2:  [92 %-93 %] 92 %  BP: (110-124)/(61-74) 124/74        There is no height or weight on file to calculate BMI.    Physical Exam   Constitutional: He is oriented to person, place, and time. He appears well-developed and well-nourished. No distress.   HENT:   Head: Normocephalic and atraumatic.   Mouth/Throat: Oropharynx is clear and moist.   Eyes: Conjunctivae and EOM are normal.   Neck: Normal range of motion. Neck supple.   Cardiovascular: Normal rate, regular rhythm and normal heart sounds. Exam reveals no gallop and no friction rub.   No murmur heard.  Pulmonary/Chest: Effort normal and breath sounds normal. No respiratory distress. He has no wheezes. He has no rales.   Abdominal: Soft. Bowel sounds are normal. He exhibits no distension. There is tenderness (mild epigastric). There is no guarding.   Musculoskeletal: He exhibits no edema.   Left upper extremity held in contracture   Neurological: He is alert and oriented to person, place, and time.   Skin: Skin is warm and dry. No rash noted. He is not diaphoretic.   Psychiatric: He has a normal mood and affect. His behavior is normal.         CRANIAL NERVES     CN III, IV, VI   Extraocular motions are normal.          Assessment/Plan:     * Acute pancreatitis    Patient with gallstone pancreatitis admitted on 1/13 and treated with antibiotics (zosyn) and IV fluids. Currently only has minimal pain and no nausea. Transferred here for AES evaluation.     - pain control with oral agents, will add IV morphine if needed  - careful  "IV fluids given severe oliguria and dialysis requirement. Started at 100 cc/hr of LR and will need close watch on respiratory status  - Continued Zosyn overnight (given mild tachycardia and leukocytosis), however consider discontinuing in the morning with no evidence of cholecystitis on outside imaging and no evidence of pancreatic necrosis on MRCP  - blood cultures x2, lactic acid, lipase, CMP ordered  - consulted AES, will need to call in the a.m.  - CDs of imaging studies are in the paper chart, should be brought to radiology for upload in the morning  - keep NPO except for ice chips  - holding pharmacologic DVT prophylaxis pending AES eval         Acute renal failure superimposed on stage 4 chronic kidney disease    Baseline creatinine reportedly 2.5-2.6, up to 7.0 prior to transfer. Patient very oliguric with ~5cc dark brown urine at bedside.     - urinalysis ordered  - nephrology consulted, will need to call in the a.m.  - CXR ordered to confirm placement of trialysis catheter in right IJ  - no urgent indications for dialysis at time of my exam with no AMS and no respiratory distress  - STAT renal labs ordered     Sickle cell disease without crisis    IRON OVERLOAD    Patient reports innumerable transfusions over the past year. Baseline hemoglobin ~9. Reports he was recently started on Epo injections three times weekly.     Home medications: L-glutamine, exjade. Does not take hydroxyurea "because it does not agree with him". Takes norco about 4 times per week as needed for pain.     - holding exjade, contraindicated in renal failure  - L-glutamine not available in formulary, will have patient take his own medication  - retic count ordered             Chronic gout of multiple sites    Home medication: allopurinol 100mg daily    - continue         VTE Risk Mitigation (From admission, onward)        Ordered     IP VTE LOW RISK PATIENT  Once      01/17/19 0006     Place sequential compression device  Until " discontinued      01/17/19 0006             María Elena Manzo DO  Department of Hospital Medicine   [facility]

## 2019-01-17 NOTE — ASSESSMENT & PLAN NOTE
Ambrosio Newman is a 28 y.o. 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 Failed ERCP at OSH. Tbil is 40 likely multifactorial. Normal lipase here.    Continue supportive care, IVF, abx.   Plan for EUS/ERCP today.

## 2019-01-17 NOTE — SUBJECTIVE & OBJECTIVE
Past Medical History:   Diagnosis Date    Chronic gout of multiple sites 1/16/2019    CKD (chronic kidney disease), stage IV     CVA (cerebral vascular accident)     Gout     HTN (hypertension)     Iron overload     Iron overload due to repeated red blood cell transfusions 1/16/2019    Sickle cell anemia     Sickle cell disease without crisis 1/16/2019       Past Surgical History:   Procedure Laterality Date    CHOLECYSTECTOMY      ruptured gastric ulcer with amy patch      TONSILLECTOMY         Review of patient's allergies indicates:  No Known Allergies    No current facility-administered medications on file prior to encounter.      No current outpatient medications on file prior to encounter.     Family History     None        Tobacco Use    Smoking status: Never Smoker   Substance and Sexual Activity    Alcohol use: No     Frequency: Never    Drug use: No    Sexual activity: Not on file     Review of Systems   Constitutional: Negative for chills, fatigue, fever and unexpected weight change.   HENT: Negative for rhinorrhea and sore throat.    Eyes: Negative for pain and redness.   Respiratory: Negative for cough and shortness of breath.    Cardiovascular: Negative for chest pain and palpitations.   Gastrointestinal: Positive for abdominal pain. Negative for constipation, diarrhea, nausea and vomiting.   Endocrine: Negative for polydipsia and polyuria.   Genitourinary: Positive for decreased urine volume.   Musculoskeletal: Positive for back pain. Negative for neck pain.   Skin: Negative for color change and rash.   Neurological: Negative for light-headedness and headaches.   Hematological: Negative for adenopathy. Does not bruise/bleed easily.   Psychiatric/Behavioral: Negative for confusion. The patient is not nervous/anxious.      Objective:     Vital Signs (Most Recent):  Temp: 97.4 °F (36.3 °C) (01/16/19 2300)  Pulse: 100 (01/16/19 2300)  Resp: 18 (01/16/19 2300)  BP: 124/74 (01/16/19  2300)  SpO2: (!) 92 % (01/16/19 2300) Vital Signs (24h Range):  Temp:  [97.4 °F (36.3 °C)-97.9 °F (36.6 °C)] 97.4 °F (36.3 °C)  Pulse:  [] 100  Resp:  [18] 18  SpO2:  [92 %-93 %] 92 %  BP: (110-124)/(61-74) 124/74        There is no height or weight on file to calculate BMI.    Physical Exam   Constitutional: He is oriented to person, place, and time. He appears well-developed and well-nourished. No distress.   HENT:   Head: Normocephalic and atraumatic.   Mouth/Throat: Oropharynx is clear and moist.   Eyes: Conjunctivae and EOM are normal.   Neck: Normal range of motion. Neck supple.   Cardiovascular: Normal rate, regular rhythm and normal heart sounds. Exam reveals no gallop and no friction rub.   No murmur heard.  Pulmonary/Chest: Effort normal and breath sounds normal. No respiratory distress. He has no wheezes. He has no rales.   Abdominal: Soft. Bowel sounds are normal. He exhibits no distension. There is tenderness (mild epigastric). There is no guarding.   Musculoskeletal: He exhibits no edema.   Left upper extremity held in contracture   Neurological: He is alert and oriented to person, place, and time.   Skin: Skin is warm and dry. No rash noted. He is not diaphoretic.   Psychiatric: He has a normal mood and affect. His behavior is normal.         CRANIAL NERVES     CN III, IV, VI   Extraocular motions are normal.

## 2019-01-17 NOTE — ASSESSMENT & PLAN NOTE
"IRON OVERLOAD    Patient reports innumerable transfusions over the past year. Baseline hemoglobin ~9. Reports he was recently started on Epo injections three times weekly.     Home medications: L-glutamine, exjade. Does not take hydroxyurea "because it does not agree with him". Takes norco about 4 times per week as needed for pain.     - holding exjade, contraindicated with renal failure  - L-glutamine not available in formulary, will have patient take his own medication  - retic count ordered  - Hgb dropped below 7 today. Pt consented for blood. Will transfuse 1 unit pRBC          "

## 2019-01-17 NOTE — CONSULTS
[facility]  Gastroenterology  Consult Note    Patient Name: Ambrosio Newman  MRN: [id number]  Admission Date: 1/16/2019  Hospital Length of Stay: 1 days  Code Status: Full Code   Attending Provider: Callie Grace MD   Consulting Provider: Maria Alejandra Aguilera MD  Primary Care Physician: Primary Doctor No  Principal Problem:Acute pancreatitis    Inpatient consult to [facility] ([facility])  Consult performed by: Maria Alejandra Aguilera MD  Consult ordered by: María Elena Manzo DO  Reason for consult: CBD stone        Subjective:     HPI:  Ambrosio Newman is a 28 y.o. 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 He was admitted to [facility] on 1/13 with complaint of epigastric abdominal pain, nausea, vomiting, and chills. He was found to have lipase of 1123, and finding of cholelithiasis as well as stone in distal common duct on US of abdomen. Patient reports that he had laparoscopic cholecystectomy at another hospital last year but imaging showed remnant gallbladder with stones. He was treated with IV Zosyn while at [facility] and had CT abdomen, which showed gallstones in gallbladder, surgically absent spleen, and normal appearance of pancreas. MRCP was performed on 1/14 with finding of dilated common bile duct (8mm), diffuse intrahepatic and extrahepatic biliary dilatation, and small common duct stone at distal common duct. He also had LAURENCE on CKD and required HD. Patient lives at home with his mother and is unemployed. He denies any tobacco, alcohol, or drug use.     Past Medical History:   Diagnosis Date    Chronic gout of multiple sites 1/16/2019    CKD (chronic kidney disease), stage IV     CVA (cerebral  vascular accident)     Gout     HTN (hypertension)     Iron overload     Iron overload due to repeated red blood cell transfusions 1/16/2019    Sickle cell anemia     Sickle cell disease without crisis 1/16/2019       Past Surgical History:   Procedure Laterality Date    CHOLECYSTECTOMY      ruptured gastric ulcer with amy patch      TONSILLECTOMY         Review of patient's allergies indicates:  No Known Allergies  Family History     None        Tobacco Use    Smoking status: Never Smoker   Substance and Sexual Activity    Alcohol use: No     Frequency: Never    Drug use: No    Sexual activity: Not on file     Review of Systems   Constitutional: Positive for activity change and appetite change. Negative for chills, fatigue, fever and unexpected weight change.   HENT: Negative for rhinorrhea and sore throat.    Eyes: Negative for pain and redness.   Respiratory: Negative for cough and shortness of breath.    Cardiovascular: Negative for chest pain and palpitations.   Gastrointestinal: Positive for abdominal pain. Negative for constipation, diarrhea, nausea and vomiting.   Endocrine: Negative for polydipsia and polyuria.   Genitourinary: Positive for decreased urine volume.   Musculoskeletal: Positive for back pain. Negative for neck pain.   Skin: Negative for color change and rash.   Neurological: Negative for light-headedness and headaches.   Hematological: Negative for adenopathy. Does not bruise/bleed easily.   Psychiatric/Behavioral: Negative for confusion. The patient is not nervous/anxious.      Objective:     Vital Signs (Most Recent):  Temp: 97.8 °F (36.6 °C) (01/17/19 0744)  Pulse: 100 (01/17/19 0744)  Resp: 18 (01/17/19 0744)  BP: 127/61 (01/17/19 0744)  SpO2: 97 % (01/17/19 0744) Vital Signs (24h Range):  Temp:  [97.4 °F (36.3 °C)-97.9 °F (36.6 °C)] 97.8 °F (36.6 °C)  Pulse:  [] 100  Resp:  [18] 18  SpO2:  [92 %-97 %] 97 %  BP: (110-127)/(61-74) 127/61     Weight: 72.2 kg (159 lb 2  "oz) (01/17/19 0348)  Body mass index is 25.68 kg/m².      Intake/Output Summary (Last 24 hours) at 1/17/2019 0920  Last data filed at 1/17/2019 0700  Gross per 24 hour   Intake 430 ml   Output 100 ml   Net 330 ml       Lines/Drains/Airways          None          Physical Exam   Constitutional: He is oriented to person, place, and time. He has a sickly appearance. He appears ill.   HENT:   Head: Normocephalic and atraumatic.   Eyes: No scleral icterus.   Neck: Neck supple.   Cardiovascular: Normal rate. Exam reveals no gallop and no friction rub.   Abdominal: Soft. Bowel sounds are normal. He exhibits no distension and no mass. There is tenderness (mid epigastric). There is no rebound and no guarding. No hernia.   Abdominal scar   Musculoskeletal: He exhibits deformity. He exhibits no edema.   Neurological: He is alert and oriented to person, place, and time.   Skin: Skin is warm.   Psychiatric: He has a normal mood and affect. His behavior is normal.   Vitals reviewed.    /61 (BP Location: Right arm, Patient Position: Lying)   Pulse 100   Temp 97.8 °F (36.6 °C) (Oral)   Resp 18   Ht 5' 6" (1.676 m)   Wt 72.2 kg (159 lb 2 oz)   SpO2 97%   BMI 25.68 kg/m²    Lab Results   Component Value Date    WBC 23.04 (H) 01/17/2019    HGB 7.3 (L) 01/17/2019    HCT 20.5 (L) 01/17/2019    MCV 79 (L) 01/17/2019     01/17/2019     Lab Results   Component Value Date    INR 1.2 01/17/2019     Lab Results   Component Value Date     (L) 01/17/2019    K 4.8 01/17/2019    CREATININE 4.7 (H) 01/17/2019     Lab Results   Component Value Date    ALBUMIN 2.4 (L) 01/17/2019     (H) 01/17/2019     (H) 01/17/2019    ALKPHOS 392 (H) 01/17/2019    BILITOT 46.3 (H) 01/17/2019     No results found for: AFP  Lab Results   Component Value Date    LIPASE 57 01/17/2019      No results found for: TACROLIMUS    Imaging:  Reviewed and as noted in HPI.         Assessment/Plan:     Common bile duct stone    Ambrosio " Trent is a 28 y.o. 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 Failed ERCP at OSH. Tbil is 40 likely multifactorial. Normal lipase here.    Continue supportive care, IVF, abx.   Plan for EUS/ERCP today.               Thank you for your consult.    Maria Alejandra Aguilera MD  Gastroenterology  [facility]

## 2019-01-17 NOTE — SUBJECTIVE & OBJECTIVE
Past Medical History:   Diagnosis Date    Chronic gout of multiple sites 1/16/2019    CKD (chronic kidney disease), stage IV     CVA (cerebral vascular accident)     Gout     HTN (hypertension)     Iron overload     Iron overload due to repeated red blood cell transfusions 1/16/2019    Sickle cell anemia     Sickle cell disease without crisis 1/16/2019       Past Surgical History:   Procedure Laterality Date    CHOLECYSTECTOMY      ruptured gastric ulcer with amy patch      TONSILLECTOMY         Review of patient's allergies indicates:  No Known Allergies  Current Facility-Administered Medications   Medication Frequency    allopurinol tablet 100 mg Daily    dextrose 50% injection 12.5 g PRN    dextrose 50% injection 25 g PRN    glucagon (human recombinant) injection 1 mg PRN    glucose chewable tablet 16 g PRN    glucose chewable tablet 24 g PRN    HYDROcodone-acetaminophen  mg per tablet 1 tablet Q6H PRN    HYDROcodone-acetaminophen 5-325 mg per tablet 1 tablet Q6H PRN    ondansetron disintegrating tablet 8 mg Q8H PRN    piperacillin-tazobactam 4.5 g in sodium chloride 0.9% 100 mL IVPB (ready to mix system) Q12H    ramelteon tablet 8 mg Nightly PRN    senna-docusate 8.6-50 mg per tablet 1 tablet BID    sodium chloride 0.9% flush 5 mL PRN     Family History     None        Tobacco Use    Smoking status: Never Smoker   Substance and Sexual Activity    Alcohol use: No     Frequency: Never    Drug use: No    Sexual activity: Not on file     Review of Systems   Constitutional: Negative for chills, fatigue, fever and unexpected weight change.   HENT: Negative for rhinorrhea and sore throat.    Eyes: Negative for pain and redness.   Respiratory: Negative for cough and shortness of breath.    Cardiovascular: Negative for chest pain and palpitations.   Gastrointestinal: Positive for abdominal pain. Negative for constipation, diarrhea, nausea and vomiting.   Endocrine: Negative for  polydipsia and polyuria.   Genitourinary: Positive for decreased urine volume.   Musculoskeletal: Positive for back pain. Negative for neck pain.   Skin: Negative for color change and rash.   Neurological: Negative for light-headedness and headaches.   Hematological: Negative for adenopathy. Does not bruise/bleed easily.   Psychiatric/Behavioral: Negative for confusion. The patient is not nervous/anxious.      Objective:     Vital Signs (Most Recent):  Temp: 97.8 °F (36.6 °C) (01/17/19 0744)  Pulse: 100 (01/17/19 0744)  Resp: 18 (01/17/19 0744)  BP: 127/61 (01/17/19 0744)  SpO2: 97 % (01/17/19 0744)  O2 Device (Oxygen Therapy): room air (01/17/19 0744) Vital Signs (24h Range):  Temp:  [97.4 °F (36.3 °C)-97.9 °F (36.6 °C)] 97.8 °F (36.6 °C)  Pulse:  [] 100  Resp:  [18] 18  SpO2:  [92 %-97 %] 97 %  BP: (110-127)/(61-74) 127/61     Weight: 72.2 kg (159 lb 2 oz) (01/17/19 0348)  Body mass index is 25.68 kg/m².  Body surface area is 1.83 meters squared.    I/O last 3 completed shifts:  In: 430 [P.O.:25; I.V.:405]  Out: 100 [Urine:100]    Physical Exam   Constitutional: He is oriented to person, place, and time. He appears well-developed and well-nourished. No distress.   HENT:   Head: Normocephalic and atraumatic.   Mouth/Throat: Oropharynx is clear and moist.   Eyes: Conjunctivae and EOM are normal.   Neck: Normal range of motion. Neck supple.   Cardiovascular: Regular rhythm and normal heart sounds. Tachycardia present. Exam reveals no gallop and no friction rub.   No murmur heard.  Pulmonary/Chest: Effort normal and breath sounds normal. No stridor. No respiratory distress. He has no wheezes. He has no rales.   Abdominal: Soft. Bowel sounds are normal. He exhibits no distension. There is tenderness (mild epigastric). There is no guarding.   Musculoskeletal: He exhibits deformity. He exhibits no edema or tenderness.   Left upper extremity held in contracture   Neurological: He is alert and oriented to person,  place, and time.   Skin: Skin is warm and dry. No rash noted. He is not diaphoretic.   Psychiatric: He has a normal mood and affect. His behavior is normal.       Significant Labs:  BMP:   Recent Labs   Lab 01/17/19 0031   GLU 86   CL 99   CO2 18*   BUN 45*   CREATININE 4.7*   CALCIUM 9.4   MG 1.4*     CBC:   Recent Labs   Lab 01/17/19 0445   WBC 23.04*   RBC 2.59*   HGB 7.3*   HCT 20.5*      MCV 79*   MCH 28.2   MCHC 35.6     CMP:   Recent Labs   Lab 01/17/19 0031 01/17/19 0445   GLU 86  --    CALCIUM 9.4  --    ALBUMIN 2.6* 2.4*   PROT 6.0 5.6*   *  --    K 4.8  --    CO2 18*  --    CL 99  --    BUN 45*  --    CREATININE 4.7*  --    ALKPHOS 397* 392*   * 123*   * 141*   BILITOT 46.5* 46.3*     LFTs:   Recent Labs   Lab 01/17/19 0445   *   *   ALKPHOS 392*   BILITOT 46.3*   PROT 5.6*   ALBUMIN 2.4*     All labs within the past 24 hours have been reviewed.    Significant Imaging:  X-Ray: Reviewed  Congestion consistent w/ fluid overload

## 2019-01-17 NOTE — ASSESSMENT & PLAN NOTE
28 M who was transferred overnight from OSH for gallstone pancreatitis AES evaluation.  CKD4 per chart, 1st time dialysis on 1/16 for Cr 7 and oliguria.  Baseline Cr reportedly ~2.5 per transfer note.  Cr 4.7 at time of admission pending AM labs.  He remains oliguric w/ 100mL recorded output since arriving, however no AMS or resp distress.  RIJ trialysis is in place.  He has no signs/symptoms of uremia.  He does not appear or sound fluid overloaded on exam, however CXR consistent w/ congestive failure likely due to volume overload.    Plan  -Recommend heme onc consult regarding management of sickle cell disease, concern for active sickling   -For pulmonary edema and oliguria, considering lasix challenge, however do not want to give lasix if concern for acute chest syndrome; will need to be assessed by staff nephrologist Dr. Wright when pt returns from AES procedure (please see attestation)  -Will continue to monitor daily labs and fluid status, dialyzing as needed, possibly tomorrow

## 2019-01-17 NOTE — PLAN OF CARE
Patient is currently off of the floor. Unable to complete the discharge planning assessment at this time. Will re-attempt at a later time.    Hannah Doran RN  Ext [phone]

## 2019-01-17 NOTE — NURSING TRANSFER
Nursing Transfer Note      1/17/2019     Transfer {TRANSFER TO/529a  Transfer via {pct    Transfer with   Transported by pct  Medicines sent:none    Chart send with patient: {YES  Notified: mother    Patient reassessed at: *1400 1/17/2019    Upon arrival to floor:call bell in bed bed in low position

## 2019-01-17 NOTE — SUBJECTIVE & OBJECTIVE
Past Medical History:   Diagnosis Date    Chronic gout of multiple sites 1/16/2019    CKD (chronic kidney disease), stage IV     CVA (cerebral vascular accident)     Gout     HTN (hypertension)     Iron overload     Iron overload due to repeated red blood cell transfusions 1/16/2019    Sickle cell anemia     Sickle cell disease without crisis 1/16/2019       Past Surgical History:   Procedure Laterality Date    CHOLECYSTECTOMY      ruptured gastric ulcer with amy patch      TONSILLECTOMY         Review of patient's allergies indicates:  No Known Allergies  Family History     None        Tobacco Use    Smoking status: Never Smoker   Substance and Sexual Activity    Alcohol use: No     Frequency: Never    Drug use: No    Sexual activity: Not on file     Review of Systems   Constitutional: Positive for activity change and appetite change. Negative for chills, fatigue, fever and unexpected weight change.   HENT: Negative for rhinorrhea and sore throat.    Eyes: Negative for pain and redness.   Respiratory: Negative for cough and shortness of breath.    Cardiovascular: Negative for chest pain and palpitations.   Gastrointestinal: Positive for abdominal pain. Negative for constipation, diarrhea, nausea and vomiting.   Endocrine: Negative for polydipsia and polyuria.   Genitourinary: Positive for decreased urine volume.   Musculoskeletal: Positive for back pain. Negative for neck pain.   Skin: Negative for color change and rash.   Neurological: Negative for light-headedness and headaches.   Hematological: Negative for adenopathy. Does not bruise/bleed easily.   Psychiatric/Behavioral: Negative for confusion. The patient is not nervous/anxious.      Objective:     Vital Signs (Most Recent):  Temp: 97.8 °F (36.6 °C) (01/17/19 0744)  Pulse: 100 (01/17/19 0744)  Resp: 18 (01/17/19 0744)  BP: 127/61 (01/17/19 0744)  SpO2: 97 % (01/17/19 0744) Vital Signs (24h Range):  Temp:  [97.4 °F (36.3 °C)-97.9 °F (36.6  "°C)] 97.8 °F (36.6 °C)  Pulse:  [] 100  Resp:  [18] 18  SpO2:  [92 %-97 %] 97 %  BP: (110-127)/(61-74) 127/61     Weight: 72.2 kg (159 lb 2 oz) (01/17/19 0348)  Body mass index is 25.68 kg/m².      Intake/Output Summary (Last 24 hours) at 1/17/2019 0920  Last data filed at 1/17/2019 0700  Gross per 24 hour   Intake 430 ml   Output 100 ml   Net 330 ml       Lines/Drains/Airways          None          Physical Exam   Constitutional: He is oriented to person, place, and time. He has a sickly appearance. He appears ill.   HENT:   Head: Normocephalic and atraumatic.   Eyes: No scleral icterus.   Neck: Neck supple.   Cardiovascular: Normal rate. Exam reveals no gallop and no friction rub.   Abdominal: Soft. Bowel sounds are normal. He exhibits no distension and no mass. There is tenderness (mid epigastric). There is no rebound and no guarding. No hernia.   Abdominal scar   Musculoskeletal: He exhibits deformity. He exhibits no edema.   Neurological: He is alert and oriented to person, place, and time.   Skin: Skin is warm.   Psychiatric: He has a normal mood and affect. His behavior is normal.   Vitals reviewed.    /61 (BP Location: Right arm, Patient Position: Lying)   Pulse 100   Temp 97.8 °F (36.6 °C) (Oral)   Resp 18   Ht 5' 6" (1.676 m)   Wt 72.2 kg (159 lb 2 oz)   SpO2 97%   BMI 25.68 kg/m²   Lab Results   Component Value Date    WBC 23.04 (H) 01/17/2019    HGB 7.3 (L) 01/17/2019    HCT 20.5 (L) 01/17/2019    MCV 79 (L) 01/17/2019     01/17/2019     Lab Results   Component Value Date    INR 1.2 01/17/2019     Lab Results   Component Value Date     (L) 01/17/2019    K 4.8 01/17/2019    CREATININE 4.7 (H) 01/17/2019     Lab Results   Component Value Date    ALBUMIN 2.4 (L) 01/17/2019     (H) 01/17/2019     (H) 01/17/2019    ALKPHOS 392 (H) 01/17/2019    BILITOT 46.3 (H) 01/17/2019     No results found for: AFP  Lab Results   Component Value Date    LIPASE 57 01/17/2019    "   No results found for: TACROLIMUS    Imaging:  Reviewed and as noted in HPI.

## 2019-01-17 NOTE — ASSESSMENT & PLAN NOTE
Patient with gallstone pancreatitis admitted on 1/13 and treated with antibiotics (zosyn) and IV fluids. Currently only has minimal pain and no nausea. Transferred here for AES evaluation.     - pain control with oral agents, will add IV morphine if needed  - careful IV fluids given severe oliguria and dialysis requirement. Started at 100 cc/hr of LR and will need close watch on respiratory status  - holding off on antibiotics as patient does not appear septic and there was no evidence of necrosis on MRCP  - blood cultures x2, lactic acid, lipase, CMP ordered  - consulted AES, will need to call in the a.m.  - CDs of imaging studies are in the paper chart, should be brought to radiology for upload in the morning  - keep NPO except for ice chips  - holding pharmacologic DVT prophylaxis pending AES eval

## 2019-01-17 NOTE — ASSESSMENT & PLAN NOTE
"IRON OVERLOAD    Patient reports innumerable transfusions over the past year. Baseline hemoglobin ~9. Reports he was recently started on Epo injections three times weekly.     Home medications: L-glutamine, exjade. Does not take hydroxyurea "because it does not agree with him". Takes norco about 4 times per week as needed for pain.     - will resume exjade, patient needs to be weighed for dosing (order placed)  - L-glutamine not available in formulary, will have patient take his own medication  - retic count ordered          "

## 2019-01-17 NOTE — HPI
Mr. 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     Mr. Newman was admitted to [facility] on 1/13 with complaint of epigastric abdominal pain, nausea, vomiting, and chills. He was found to have lipase of 1123, and finding of cholelithiasis as well as stone in distal common duct on US of abdomen. Patient reports that he had laparoscopic cholecystectomy at another hospital last year. Mr. Newman was treated with IV Zosyn while at [facility] and had CT abdomen, which showed gallstones in gallbladder, surgically absent spleen, and normal appearance of pancreas. MRCP was performed on 1/14 with finding of dilated common bile duct (8mm), diffuse intrahepatic and extrahepatic biliary dilatation, and small common duct stone at distal common duct.     During his hospitalization, Mr. Newman was started on dialysis for the first time on 1/16 due to oliguria and rising creatinine (up to 7.0). He had trialysis catheter placed in right IJ.     Mr. Newman currently complains of back pain and mild epigastric abdominal pain. He denies chest pain, shortness of breath, cough, or nausea at this time. He has been NPO for several days and now complains of thirst. He denies fevers. Patient lives at home with his mother and is unemployed. He denies any tobacco, alcohol, or drug use.     Per Dr. Laurent's transfer note:      LABS:         T. brie 36.6  Plt 197  INR 1.2  Cr 7.0  Hgb 8.3 and reported as stable over past 3 days

## 2019-01-17 NOTE — CONSULTS
[facility]  Nephrology  Consult Note    Patient Name: Ambrosio Newman  MRN: [id number]  Admission Date: 1/16/2019  Hospital Length of Stay: 1 days  Attending Provider: Callie Grace MD   Primary Care Physician: Primary Doctor No  Principal Problem:Acute pancreatitis    Inpatient consult to Nephrology  Consult performed by: Gil Sparks MD  Consult ordered by: María Elena Manzo DO        Subjective:     HPI: Ambrosio Newman is a 28 year old M who was transferred overnight from OSH for gallstone pancreatitis AES evaluation.  MHx includes sickle cell disease, CVA, HTN, gout, and CKD4.  Nephrology was consulted because patient received first time dialysis on 1/16 due to oliguria and increasing Cr, up to 7.0.  His baseline Cr is reportedly ~2.5 and was 4.7 at time of admission.  He remains oliguric w/ 100mL recorded output since arriving, however no AMS or resp distress.  RIJ trialysis is in place.  He has no signs/symptoms of uremia.  He does not appear or sound fluid overloaded on exam, however CXR consistent w/ congestive failure likely due to volume overload.    Past Medical History:   Diagnosis Date    Chronic gout of multiple sites 1/16/2019    CKD (chronic kidney disease), stage IV     CVA (cerebral vascular accident)     Gout     HTN (hypertension)     Iron overload     Iron overload due to repeated red blood cell transfusions 1/16/2019    Sickle cell anemia     Sickle cell disease without crisis 1/16/2019       Past Surgical History:   Procedure Laterality Date    CHOLECYSTECTOMY      ruptured gastric ulcer with amy patch      TONSILLECTOMY         Review of patient's allergies indicates:  No Known Allergies  Current Facility-Administered Medications   Medication Frequency    allopurinol tablet 100 mg Daily    dextrose 50% injection 12.5 g PRN    dextrose 50% injection 25 g PRN    glucagon (human recombinant) injection 1 mg PRN    glucose chewable tablet 16 g PRN     glucose chewable tablet 24 g PRN    HYDROcodone-acetaminophen  mg per tablet 1 tablet Q6H PRN    HYDROcodone-acetaminophen 5-325 mg per tablet 1 tablet Q6H PRN    ondansetron disintegrating tablet 8 mg Q8H PRN    piperacillin-tazobactam 4.5 g in sodium chloride 0.9% 100 mL IVPB (ready to mix system) Q12H    ramelteon tablet 8 mg Nightly PRN    senna-docusate 8.6-50 mg per tablet 1 tablet BID    sodium chloride 0.9% flush 5 mL PRN     Family History     None        Tobacco Use    Smoking status: Never Smoker   Substance and Sexual Activity    Alcohol use: No     Frequency: Never    Drug use: No    Sexual activity: Not on file     Review of Systems   Constitutional: Negative for chills, fatigue, fever and unexpected weight change.   HENT: Negative for rhinorrhea and sore throat.    Eyes: Negative for pain and redness.   Respiratory: Negative for cough and shortness of breath.    Cardiovascular: Negative for chest pain and palpitations.   Gastrointestinal: Positive for abdominal pain. Negative for constipation, diarrhea, nausea and vomiting.   Endocrine: Negative for polydipsia and polyuria.   Genitourinary: Positive for decreased urine volume.   Musculoskeletal: Positive for back pain. Negative for neck pain.   Skin: Negative for color change and rash.   Neurological: Negative for light-headedness and headaches.   Hematological: Negative for adenopathy. Does not bruise/bleed easily.   Psychiatric/Behavioral: Negative for confusion. The patient is not nervous/anxious.      Objective:     Vital Signs (Most Recent):  Temp: 97.8 °F (36.6 °C) (01/17/19 0744)  Pulse: 100 (01/17/19 0744)  Resp: 18 (01/17/19 0744)  BP: 127/61 (01/17/19 0744)  SpO2: 97 % (01/17/19 0744)  O2 Device (Oxygen Therapy): room air (01/17/19 0744) Vital Signs (24h Range):  Temp:  [97.4 °F (36.3 °C)-97.9 °F (36.6 °C)] 97.8 °F (36.6 °C)  Pulse:  [] 100  Resp:  [18] 18  SpO2:  [92 %-97 %] 97 %  BP: (110-127)/(61-74) 127/61      Weight: 72.2 kg (159 lb 2 oz) (01/17/19 0348)  Body mass index is 25.68 kg/m².  Body surface area is 1.83 meters squared.    I/O last 3 completed shifts:  In: 430 [P.O.:25; I.V.:405]  Out: 100 [Urine:100]    Physical Exam   Constitutional: He is oriented to person, place, and time. He appears well-developed and well-nourished. No distress.   HENT:   Head: Normocephalic and atraumatic.   Mouth/Throat: Oropharynx is clear and moist.   Eyes: Conjunctivae and EOM are normal.   Neck: Normal range of motion. Neck supple.   Cardiovascular: Regular rhythm and normal heart sounds. Tachycardia present. Exam reveals no gallop and no friction rub.   No murmur heard.  Pulmonary/Chest: Effort normal and breath sounds normal. No stridor. No respiratory distress. He has no wheezes. He has no rales.   Abdominal: Soft. Bowel sounds are normal. He exhibits no distension. There is tenderness (mild epigastric). There is no guarding.   Musculoskeletal: He exhibits deformity. He exhibits no edema or tenderness.   Left upper extremity held in contracture   Neurological: He is alert and oriented to person, place, and time.   Skin: Skin is warm and dry. No rash noted. He is not diaphoretic.   Psychiatric: He has a normal mood and affect. His behavior is normal.       Significant Labs:  BMP:   Recent Labs   Lab 01/17/19 0031   GLU 86   CL 99   CO2 18*   BUN 45*   CREATININE 4.7*   CALCIUM 9.4   MG 1.4*     CBC:   Recent Labs   Lab 01/17/19 0445   WBC 23.04*   RBC 2.59*   HGB 7.3*   HCT 20.5*      MCV 79*   MCH 28.2   MCHC 35.6     CMP:   Recent Labs   Lab 01/17/19 0031 01/17/19 0445   GLU 86  --    CALCIUM 9.4  --    ALBUMIN 2.6* 2.4*   PROT 6.0 5.6*   *  --    K 4.8  --    CO2 18*  --    CL 99  --    BUN 45*  --    CREATININE 4.7*  --    ALKPHOS 397* 392*   * 123*   * 141*   BILITOT 46.5* 46.3*     LFTs:   Recent Labs   Lab 01/17/19  0445   *   *   ALKPHOS 392*   BILITOT 46.3*   PROT 5.6*    ALBUMIN 2.4*     All labs within the past 24 hours have been reviewed.    Significant Imaging:  X-Ray: Reviewed  Congestion consistent w/ fluid overload    Assessment/Plan:     Acute renal failure superimposed on stage 4 chronic kidney disease    28 M who was transferred overnight from OSH for gallstone pancreatitis AES evaluation.  CKD4 per chart, 1st time dialysis on 1/16 for Cr 7 and oliguria.  Baseline Cr reportedly ~2.5 per transfer note.  Cr 4.7 at time of admission pending AM labs.  He remains oliguric w/ 100mL recorded output since arriving, however no AMS or resp distress.  RIJ trialysis is in place.  He has no signs/symptoms of uremia.  He does not appear or sound fluid overloaded on exam, however CXR consistent w/ congestive failure likely due to volume overload.    Plan  -Recommend heme onc consult regarding management of sickle cell disease, concern for active sickling   -For pulmonary edema and oliguria, considering lasix challenge, however do not want to give lasix if concern for acute chest syndrome; will need to be assessed by staff nephrologist Dr. Wright when pt returns from AES procedure (please see attestation)  -Will continue to monitor daily labs and fluid status, dialyzing as needed, possibly tomorrow         Thank you for your consult. I will follow-up with patient. Please contact us if you have any additional questions.    Gil Sparks MD  Nephrology  [facility]

## 2019-01-17 NOTE — ANESTHESIA POSTPROCEDURE EVALUATION
"Anesthesia Post Evaluation    Patient: Ambrosio Newman    Procedure(s) Performed: Procedure(s) (LRB):  ULTRASOUND, ENDOSCOPIC, UPPER GI TRACT (N/A)  ERCP (ENDOSCOPIC RETROGRADE CHOLANGIOPANCREATOGRAPHY) (N/A)    Final Anesthesia Type: general  Patient location during evaluation: PACU  Patient participation: Yes- Able to Participate  Level of consciousness: awake and alert and oriented  Pain management: adequate  Airway patency: patent  PONV status at discharge: No PONV  Anesthetic complications: no      Cardiovascular status: blood pressure returned to baseline and hemodynamically stable  Respiratory status: unassisted  Hydration status: euvolemic  Follow-up not needed.        Visit Vitals  /71 (BP Location: Right arm, Patient Position: Lying)   Pulse 106   Temp 37.5 °C (99.5 °F) (Temporal)   Resp 11   Ht 5' 6" (1.676 m)   Wt 72.2 kg (159 lb 2 oz)   SpO2 (!) 93%   BMI 25.68 kg/m²       Pain/Tamiko Score: Pain Rating Prior to Med Admin: 7 (1/17/2019  1:10 AM)  Tamiko Score: 10 (1/17/2019  2:00 PM)        "

## 2019-01-17 NOTE — [employer]ATION PATIENT INSTRUCTIONS
Discharge Summary/Instructions after an Endoscopic Procedure  Patient Name: Ambrosio Newman  Patient MRN: [id number]  Patient YOB: 1990 Thursday, January 17, 2019  Dickson Coto MD  RESTRICTIONS:  During your procedure today, you received medications for sedation.  These   medications may affect your judgment, balance and coordination.  Therefore,   for 24 hours, you have the following restrictions:   - DO NOT drive a car, operate machinery, make legal/financial decisions,   sign important papers or drink alcohol.    ACTIVITY:  Today: no heavy lifting, straining or running due to procedural   sedation/anesthesia.  The following day: return to full activity including work.  DIET:  Eat and drink normally unless instructed otherwise.     TREATMENT FOR COMMON SIDE EFFECTS:  - Mild abdominal pain, nausea, belching, bloating or excessive gas:  rest,   eat lightly and use a heating pad.  - Sore Throat: treat with throat lozenges and/or gargle with warm salt   water.  - Because air was used during the procedure, expelling large amounts of air   from your rectum or belching is normal.  - If a bowel prep was taken, you may not have a bowel movement for 1-3 days.    This is normal.  SYMPTOMS TO WATCH FOR AND REPORT TO YOUR PHYSICIAN:  1. Abdominal pain or bloating, other than gas cramps.  2. Chest pain.  3. Back pain.  4. Signs of infection such as: chills or fever occurring within 24 hours   after the procedure.  5. Rectal bleeding, which would show as bright red, maroon, or black stools.   (A tablespoon of blood from the rectum is not serious, especially if   hemorrhoids are present.)  6. Vomiting.  7. Weakness or dizziness.  GO DIRECTLY TO THE NEAREST EMERGENCY ROOM IF YOU HAVE ANY OF THE FOLLOWING:      Difficulty breathing              Chills and/or fever over 101 F   Persistent vomiting and/or vomiting blood   Severe abdominal pain   Severe chest pain   Black, tarry stools   Bleeding- more than one  tablespoon   Any other symptom or condition that you feel may need urgent attention  Your doctor recommends these additional instructions:  If any biopsies were taken, your doctors clinic will contact you in 1 to 2   weeks with any results.  - Return patient to hospital arce for ongoing care.   - Avoid aspirin and nonsteroidal anti-inflammatory medicines.   - Watch for pancreatitis, bleeding, perforation, and cholangitis.   - Repeat ERCP in 10 weeks to remove stent.   - Continue antibiotics for 7-10 days.  For questions, problems or results please call your physician - Dickson Coto MD at Work:  [phone].  [facility], EMERGENCY ROOM PHONE NUMBER: [phone]  IF A COMPLICATION OR EMERGENCY SITUATION ARISES AND YOU ARE UNABLE TO REACH   YOUR PHYSICIAN - GO DIRECTLY TO THE EMERGENCY ROOM.  Dickson Coto MD  1/17/2019 12:49:29 PM  This report has been verified and signed electronically.  [employer]

## 2019-01-18 ENCOUNTER — TELEPHONE (OUTPATIENT)
Dept: ENDOSCOPY | Facility: HOSPITAL | Age: 29
End: 2019-01-18

## 2019-01-18 DIAGNOSIS — K83.1 BILIARY STRICTURE: Primary | ICD-10-CM

## 2019-01-18 LAB
ABO + RH BLD: NORMAL
ALBUMIN SERPL BCP-MCNC: 2.1 G/DL
ALBUMIN SERPL BCP-MCNC: 2.1 G/DL
ALP SERPL-CCNC: 341 U/L
ALT SERPL W/O P-5'-P-CCNC: 91 U/L
ANION GAP SERPL CALC-SCNC: 10 MMOL/L
ANISOCYTOSIS BLD QL SMEAR: ABNORMAL
AST SERPL-CCNC: 97 U/L
BASO STIPL BLD QL SMEAR: ABNORMAL
BASOPHILS # BLD AUTO: 0.16 K/UL
BASOPHILS NFR BLD: 0.7 %
BILIRUB DIRECT SERPL-MCNC: >14 MG/DL
BILIRUB SERPL-MCNC: 44 MG/DL
BLD GP AB SCN CELLS X3 SERPL QL: NORMAL
BLD PROD TYP BPU: NORMAL
BLOOD UNIT EXPIRATION DATE: NORMAL
BLOOD UNIT TYPE CODE: 7300
BLOOD UNIT TYPE: NORMAL
BUN SERPL-MCNC: 63 MG/DL
CALCIUM SERPL-MCNC: 9.4 MG/DL
CHLORIDE SERPL-SCNC: 101 MMOL/L
CO2 SERPL-SCNC: 24 MMOL/L
CODING SYSTEM: NORMAL
CREAT SERPL-MCNC: 6.3 MG/DL
DIFFERENTIAL METHOD: ABNORMAL
DISPENSE STATUS: NORMAL
EOSINOPHIL # BLD AUTO: 1.1 K/UL
EOSINOPHIL NFR BLD: 4.7 %
ERYTHROCYTE [DISTWIDTH] IN BLOOD BY AUTOMATED COUNT: 20 %
EST. GFR  (AFRICAN AMERICAN): 12.7 ML/MIN/1.73 M^2
EST. GFR  (NON AFRICAN AMERICAN): 11 ML/MIN/1.73 M^2
GLUCOSE SERPL-MCNC: 101 MG/DL
HCT VFR BLD AUTO: 18.6 %
HGB BLD-MCNC: 6.6 G/DL
HYPOCHROMIA BLD QL SMEAR: ABNORMAL
IMM GRANULOCYTES # BLD AUTO: 0.47 K/UL
IMM GRANULOCYTES NFR BLD AUTO: 1.9 %
LYMPHOCYTES # BLD AUTO: 2.5 K/UL
LYMPHOCYTES NFR BLD: 10.2 %
MAGNESIUM SERPL-MCNC: 1.5 MG/DL
MCH RBC QN AUTO: 28 PG
MCHC RBC AUTO-ENTMCNC: 35.5 G/DL
MCV RBC AUTO: 79 FL
MONOCYTES # BLD AUTO: 4.2 K/UL
MONOCYTES NFR BLD: 17.1 %
NEUTROPHILS # BLD AUTO: 16 K/UL
NEUTROPHILS NFR BLD: 65.4 %
NRBC BLD-RTO: 4 /100 WBC
OVALOCYTES BLD QL SMEAR: ABNORMAL
PAPPENHEIMER BOD BLD QL SMEAR: PRESENT
PHOSPHATE SERPL-MCNC: 3.3 MG/DL
PLATELET # BLD AUTO: 208 K/UL
PLATELET BLD QL SMEAR: ABNORMAL
PMV BLD AUTO: 10.9 FL
POIKILOCYTOSIS BLD QL SMEAR: ABNORMAL
POLYCHROMASIA BLD QL SMEAR: ABNORMAL
POTASSIUM SERPL-SCNC: 4.3 MMOL/L
PROT SERPL-MCNC: 5.2 G/DL
RBC # BLD AUTO: 2.36 M/UL
SCHISTOCYTES BLD QL SMEAR: ABNORMAL
SCHISTOCYTES BLD QL SMEAR: PRESENT
SICKLE CELLS BLD QL SMEAR: ABNORMAL
SODIUM SERPL-SCNC: 135 MMOL/L
SPHEROCYTES BLD QL SMEAR: ABNORMAL
TARGETS BLD QL SMEAR: ABNORMAL
TRANS ERYTHROCYTES VOL PATIENT: NORMAL ML
WBC # BLD AUTO: 24.46 K/UL

## 2019-01-18 PROCEDURE — 63600175 PHARM REV CODE 636 W HCPCS: Performed by: STUDENT IN AN ORGANIZED HEALTH CARE EDUCATION/TRAINING PROGRAM

## 2019-01-18 PROCEDURE — P9021 RED BLOOD CELLS UNIT: HCPCS

## 2019-01-18 PROCEDURE — 36430 TRANSFUSION BLD/BLD COMPNT: CPT

## 2019-01-18 PROCEDURE — 84075 ASSAY ALKALINE PHOSPHATASE: CPT

## 2019-01-18 PROCEDURE — 99233 SBSQ HOSP IP/OBS HIGH 50: CPT | Mod: ,,, | Performed by: INTERNAL MEDICINE

## 2019-01-18 PROCEDURE — 99232 PR SUBSEQUENT HOSPITAL CARE,LEVL II: ICD-10-PCS | Mod: GC,,, | Performed by: INTERNAL MEDICINE

## 2019-01-18 PROCEDURE — 86920 COMPATIBILITY TEST SPIN: CPT

## 2019-01-18 PROCEDURE — 25000003 PHARM REV CODE 250: Performed by: STUDENT IN AN ORGANIZED HEALTH CARE EDUCATION/TRAINING PROGRAM

## 2019-01-18 PROCEDURE — 85025 COMPLETE CBC W/AUTO DIFF WBC: CPT

## 2019-01-18 PROCEDURE — 83735 ASSAY OF MAGNESIUM: CPT

## 2019-01-18 PROCEDURE — 97165 OT EVAL LOW COMPLEX 30 MIN: CPT

## 2019-01-18 PROCEDURE — 11000001 HC ACUTE MED/SURG PRIVATE ROOM

## 2019-01-18 PROCEDURE — 80069 RENAL FUNCTION PANEL: CPT

## 2019-01-18 PROCEDURE — 99233 PR SUBSEQUENT HOSPITAL CARE,LEVL III: ICD-10-PCS | Mod: ,,, | Performed by: INTERNAL MEDICINE

## 2019-01-18 PROCEDURE — 86901 BLOOD TYPING SEROLOGIC RH(D): CPT

## 2019-01-18 PROCEDURE — 99232 SBSQ HOSP IP/OBS MODERATE 35: CPT | Mod: GC,,, | Performed by: INTERNAL MEDICINE

## 2019-01-18 RX ORDER — HYDROCODONE BITARTRATE AND ACETAMINOPHEN 500; 5 MG/1; MG/1
TABLET ORAL
Status: DISCONTINUED | OUTPATIENT
Start: 2019-01-18 | End: 2019-01-21

## 2019-01-18 RX ORDER — HEPARIN SODIUM 5000 [USP'U]/ML
5000 INJECTION, SOLUTION INTRAVENOUS; SUBCUTANEOUS EVERY 8 HOURS
Status: DISCONTINUED | OUTPATIENT
Start: 2019-01-18 | End: 2019-01-22 | Stop reason: HOSPADM

## 2019-01-18 RX ORDER — LORAZEPAM/0.9% SODIUM CHLORIDE 100MG/0.1L
2 PLASTIC BAG, INJECTION (ML) INTRAVENOUS ONCE
Status: COMPLETED | OUTPATIENT
Start: 2019-01-18 | End: 2019-01-18

## 2019-01-18 RX ORDER — ATORVASTATIN CALCIUM 20 MG/1
40 TABLET, FILM COATED ORAL DAILY
Status: DISCONTINUED | OUTPATIENT
Start: 2019-01-18 | End: 2019-01-21

## 2019-01-18 RX ORDER — NAPROXEN SODIUM 220 MG/1
81 TABLET, FILM COATED ORAL DAILY
Status: DISCONTINUED | OUTPATIENT
Start: 2019-01-18 | End: 2019-01-22 | Stop reason: HOSPADM

## 2019-01-18 RX ADMIN — MAGNESIUM OXIDE TAB 400 MG (241.3 MG ELEMENTAL MG) 800 MG: 400 (241.3 MG) TAB at 08:01

## 2019-01-18 RX ADMIN — HYDROCODONE BITARTRATE AND ACETAMINOPHEN 1 TABLET: 10; 325 TABLET ORAL at 08:01

## 2019-01-18 RX ADMIN — STANDARDIZED SENNA CONCENTRATE AND DOCUSATE SODIUM 1 TABLET: 8.6; 5 TABLET, FILM COATED ORAL at 08:01

## 2019-01-18 RX ADMIN — HEPARIN SODIUM 5000 UNITS: 5000 INJECTION, SOLUTION INTRAVENOUS; SUBCUTANEOUS at 09:01

## 2019-01-18 RX ADMIN — MAGNESIUM SULFATE IN WATER 2 G: 40 INJECTION, SOLUTION INTRAVENOUS at 07:01

## 2019-01-18 RX ADMIN — PIPERACILLIN AND TAZOBACTAM 4.5 G: 4; .5 INJECTION, POWDER, LYOPHILIZED, FOR SOLUTION INTRAVENOUS; PARENTERAL at 07:01

## 2019-01-18 RX ADMIN — PIPERACILLIN AND TAZOBACTAM 4.5 G: 4; .5 INJECTION, POWDER, LYOPHILIZED, FOR SOLUTION INTRAVENOUS; PARENTERAL at 04:01

## 2019-01-18 RX ADMIN — ASPIRIN 81 MG CHEWABLE TABLET 81 MG: 81 TABLET CHEWABLE at 07:01

## 2019-01-18 RX ADMIN — ATORVASTATIN CALCIUM 40 MG: 20 TABLET, FILM COATED ORAL at 07:01

## 2019-01-18 NOTE — SUBJECTIVE & OBJECTIVE
Interval History: Pt reports abdominal pain improved with stent. Pt is tolerating diet. Pt felt fatigued and tired this AM.     Review of Systems   Constitutional: Negative for chills, fatigue, fever and unexpected weight change.   HENT: Negative for congestion, hearing loss, rhinorrhea, sore throat and voice change.    Eyes: Negative for pain and redness.   Respiratory: Negative for apnea, cough and shortness of breath.    Cardiovascular: Negative for chest pain and palpitations.   Gastrointestinal: Positive for abdominal pain (improving s/p stent). Negative for constipation, diarrhea, nausea and vomiting.   Endocrine: Negative for polydipsia and polyuria.   Genitourinary: Positive for decreased urine volume.   Musculoskeletal: Positive for back pain. Negative for neck pain.   Skin: Negative for color change and rash.   Neurological: Negative for light-headedness and headaches.   Hematological: Negative for adenopathy. Does not bruise/bleed easily.   Psychiatric/Behavioral: Negative for confusion. The patient is not nervous/anxious.      Objective:     Vital Signs (Most Recent):  Temp: 99.3 °F (37.4 °C) (01/18/19 1536)  Pulse: 106 (01/18/19 1536)  Resp: 18 (01/18/19 1536)  BP: 131/79 (01/18/19 1536)  SpO2: (!) 90 % (01/18/19 1536) Vital Signs (24h Range):  Temp:  [96 °F (35.6 °C)-99.7 °F (37.6 °C)] 99.3 °F (37.4 °C)  Pulse:  [] 106  Resp:  [18] 18  SpO2:  [90 %-96 %] 90 %  BP: (113-132)/(57-79) 131/79     Weight: 72.2 kg (159 lb 2 oz)  Body mass index is 25.68 kg/m².    Intake/Output Summary (Last 24 hours) at 1/18/2019 1539  Last data filed at 1/18/2019 0731  Gross per 24 hour   Intake --   Output 850 ml   Net -850 ml      Physical Exam   Constitutional: He is oriented to person, place, and time. He appears well-developed and well-nourished. No distress.   HENT:   Head: Normocephalic and atraumatic.   Mouth/Throat: Oropharynx is clear and moist.   Eyes: Conjunctivae and EOM are normal.   Neck: Normal range  of motion. Neck supple.   Cardiovascular: Regular rhythm and normal heart sounds. Tachycardia present. Exam reveals no gallop and no friction rub.   No murmur heard.  Pulmonary/Chest: Effort normal and breath sounds normal. No stridor. No respiratory distress. He has no wheezes. He has no rales.   Abdominal: Soft. Bowel sounds are normal. He exhibits no distension. There is tenderness (RLQ about improved s/p stent). There is no guarding.   Musculoskeletal: He exhibits deformity. He exhibits no edema or tenderness.   Left upper extremity held in contracture   Neurological: He is alert and oriented to person, place, and time.   Skin: Skin is warm and dry. No rash noted. He is not diaphoretic.   Psychiatric: He has a normal mood and affect. His behavior is normal.       Significant Labs:   CBC:   Recent Labs   Lab 01/17/19  0031 01/17/19  0445 01/18/19  0455   WBC 23.65* 23.04* 24.46*   HGB 7.9* 7.3* 6.6*   HCT 21.5* 20.5* 18.6*    233 208     CMP:   Recent Labs   Lab 01/17/19  0031 01/17/19  0445 01/17/19  1250 01/18/19  0456   *  --  138 135*   K 4.8  --  4.9 4.3   CL 99  --  102 101   CO2 18*  --  22* 24   GLU 86  --  97 101   BUN 45*  --  53* 63*   CREATININE 4.7*  --  5.7* 6.3*   CALCIUM 9.4  --  9.5 9.4   PROT 6.0 5.6*  --  5.2*   ALBUMIN 2.6* 2.4* 2.3* 2.1*  2.1*   BILITOT 46.5* 46.3*  --  44.0*   ALKPHOS 397* 392*  --  341*   * 141*  --  97*   * 123*  --  91*   ANIONGAP 18*  --  14 10   EGFRNONAA 15.7*  --  12.4* 11.0*

## 2019-01-18 NOTE — PLAN OF CARE
Problem: Occupational Therapy Goal  Goal: Occupational Therapy Goal  Outcome: Outcome(s) achieved Date Met: 01/18/19  Eval completed, no OT needs

## 2019-01-18 NOTE — PROGRESS NOTES
[facility]  Nephrology  Progress Note    Patient Name: Ambrosio Newman  MRN: [id number]  Admission Date: 1/16/2019  Hospital Length of Stay: 2 days  Attending Provider: Callie Grace MD   Primary Care Physician: Primary Doctor No  Principal Problem:Acute pancreatitis    Subjective:     HPI: Ambrosio Newman is a 28 year old M who was transferred overnight from OSH for gallstone pancreatitis AES evaluation.  MHx includes sickle cell disease, CVA, HTN, gout, and CKD4.  Nephrology was consulted because patient received first time dialysis on 1/16 due to oliguria and increasing Cr, up to 7.0.  His baseline Cr is reportedly ~2.5 and was 4.7 at time of admission.  He remains oliguric w/ 100mL recorded output since arriving, however no AMS or resp distress.  RIJ trialysis is in place.  He has no signs/symptoms of uremia.  He does not appear or sound fluid overloaded on exam, however CXR consistent w/ congestive failure likely due to volume overload.    Interval History: No acute events overnight, pt tolerated AES w/ stending yesterday.  Total urine output 800mL yesterday, 450mL so far today.  No dialysis yet.  Kidney function worsened w/ Cr 6.3 up from 4.7.  Primary complaint is fatigue.    Review of patient's allergies indicates:   Allergen Reactions    Bactrim [sulfamethoxazole-trimethoprim] Hives     Current Facility-Administered Medications   Medication Frequency    0.9%  NaCl infusion (for blood administration) Q24H PRN    allopurinol tablet 100 mg Daily    dextrose 50% injection 12.5 g PRN    dextrose 50% injection 25 g PRN    glucagon (human recombinant) injection 1 mg PRN    glucose chewable tablet 16 g PRN    glucose chewable tablet 24 g PRN    HYDROcodone-acetaminophen  mg per tablet 1 tablet Q6H PRN    HYDROcodone-acetaminophen 5-325 mg per tablet 1 tablet Q6H PRN    magnesium oxide tablet 800 mg BID    ondansetron disintegrating tablet 8 mg Q8H PRN     piperacillin-tazobactam 4.5 g in sodium chloride 0.9% 100 mL IVPB (ready to mix system) Q12H    ramelteon tablet 8 mg Nightly PRN    senna-docusate 8.6-50 mg per tablet 1 tablet BID    sodium chloride 0.9% flush 5 mL PRN       Objective:     Vital Signs (Most Recent):  Temp: 96 °F (35.6 °C) (01/18/19 0745)  Pulse: 95 (01/18/19 0745)  Resp: 18 (01/18/19 0745)  BP: (!) 113/57 (01/18/19 0745)  SpO2: (!) 91 % (01/18/19 0745)  O2 Device (Oxygen Therapy): nasal cannula (01/18/19 0745) Vital Signs (24h Range):  Temp:  [96 °F (35.6 °C)-99.7 °F (37.6 °C)] 96 °F (35.6 °C)  Pulse:  [] 95  Resp:  [7-23] 18  SpO2:  [84 %-93 %] 91 %  BP: (113-157)/(57-91) 113/57     Weight: 72.2 kg (159 lb 2 oz) (01/17/19 0348)  Body mass index is 25.68 kg/m².  Body surface area is 1.83 meters squared.    I/O last 3 completed shifts:  In: 830 [P.O.:25; I.V.:805]  Out: 900 [Urine:900]    Physical Exam   Constitutional: He is oriented to person, place, and time. He appears well-developed and well-nourished. No distress.   HENT:   Head: Normocephalic and atraumatic.   Mouth/Throat: Oropharynx is clear and moist.   Eyes: Conjunctivae and EOM are normal.   Neck: Normal range of motion. Neck supple.   Cardiovascular: Regular rhythm and normal heart sounds. Tachycardia present. Exam reveals no gallop and no friction rub.   No murmur heard.  Pulmonary/Chest: Effort normal and breath sounds normal. No stridor. No respiratory distress. He has no wheezes. He has no rales.   Abdominal: Soft. Bowel sounds are normal. He exhibits no distension. There is tenderness (mild epigastric). There is no guarding.   Musculoskeletal: He exhibits deformity. He exhibits no edema or tenderness.   Left upper extremity held in contracture   Neurological: He is alert and oriented to person, place, and time.   Skin: Skin is warm and dry. No rash noted. He is not diaphoretic.   Psychiatric: He has a normal mood and affect. His behavior is normal.       Significant  Labs:  CBC:   Recent Labs   Lab 01/18/19  0455   WBC 24.46*   RBC 2.36*   HGB 6.6*   HCT 18.6*      MCV 79*   MCH 28.0   MCHC 35.5     CMP:   Recent Labs   Lab 01/18/19  0456      CALCIUM 9.4   ALBUMIN 2.1*  2.1*   PROT 5.2*   *   K 4.3   CO2 24      BUN 63*   CREATININE 6.3*   ALKPHOS 341*   ALT 91*   AST 97*   BILITOT 44.0*     LFTs:   Recent Labs   Lab 01/18/19  0456   ALT 91*   AST 97*   ALKPHOS 341*   BILITOT 44.0*   PROT 5.2*   ALBUMIN 2.1*  2.1*     All labs within the past 24 hours have been reviewed.     Significant Imaging:  NA    Assessment/Plan:     Acute renal failure superimposed on stage 4 chronic kidney disease    28 M who was transferred overnight from OSH for gallstone pancreatitis AES evaluation.  CKD4 per chart, 1st time dialysis on 1/16 for Cr 7 and oliguria.  Baseline Cr reportedly ~2.5 per transfer note.  Cr 4.7 at time of admission pending AM labs.  He remains oliguric w/ 100mL recorded output since arriving, however no AMS or resp distress.  RIJ trialysis is in place.  He has no signs/symptoms of uremia.  He does not appear or sound fluid overloaded on exam, however CXR consistent w/ congestive failure likely due to volume overload.    Plan  -Recommend heme onc consult regarding management of sickle cell disease, concern for active sickling, Hb 6.6 today requiring transfusion  -In setting of pulmonary edema and prior oliguria, considering lasix challenge, however do not want to give lasix if concern for acute chest syndrome; will need to be assessed by staff nephrologist Dr. Wright (please see attestation)  -Oliguria improved.  UO 800mL yesterday and 450mL today without diuretics  -Kidney function worsening.  Cr 6.3 up from 5.7 and 4.7.  BUN 63.  -Will continue to monitor daily labs and fluid status, dialyzing as needed, possibly today, will discuss case w/ nephrology team         Thank you for your consult. I will follow-up with patient. Please contact us if  you have any additional questions.    Gil Sparks MD  Nephrology  [facility]

## 2019-01-18 NOTE — PROGRESS NOTES
[facility] Hospital Medicine  Progress Note    Patient Name: Ambrosio Newman  MRN: [id number]  Patient Class: IP- Inpatient   Admission Date: 1/16/2019  Length of Stay: 2 days  Attending Physician: Callie Grace MD  Primary Care Provider: Cuauhtemoc Posada MD    [facility] Medicine Team: [facility] MED 4 Alvaro Mtz MD    Subjective:     Principal Problem:Acute pancreatitis    HPI:  Mr. 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 for AES evaluation of gallstone pancreatitis.     Mr. Newman was admitted to [facility] on 1/13 with complaint of epigastric abdominal pain, nausea, vomiting, and chills. He was found to have lipase of 1123, and finding of cholelithiasis as well as stone in distal common duct on US of abdomen. Patient reports that he had laparoscopic cholecystectomy at another hospital last year. Mr. Newman was treated with IV Zosyn while at [facility] and had CT abdomen, which showed gallstones in gallbladder, surgically absent spleen, and normal appearance of pancreas. MRCP was performed on 1/14 with finding of dilated common bile duct (8mm), diffuse intrahepatic and extrahepatic biliary dilatation, and small common duct stone at distal common duct.     During his hospitalization, Mr. Newman was started on dialysis for the first time on 1/16 due to oliguria and rising creatinine (up to 7.0). He had trialysis catheter placed in right IJ.     Mr. Newman currently complains of back pain and mild epigastric abdominal pain. He denies chest pain, shortness of breath, cough, or nausea at this time. He has been NPO for several days and now complains of thirst. He denies fevers. Patient lives at home with his mother and is unemployed. He denies any tobacco, alcohol, or drug use.     Per  Dr. Laurent's transfer note:      LABS:         T. brie 36.6  Plt 197  INR 1.2  Cr 7.0  Hgb 8.3 and reported as stable over past 3 days      Interval History: Pt reports abdominal pain improved with stent. Pt is tolerating diet. Pt felt fatigued and tired this AM.     Review of Systems   Constitutional: Negative for chills, fatigue, fever and unexpected weight change.   HENT: Negative for congestion, hearing loss, rhinorrhea, sore throat and voice change.    Eyes: Negative for pain and redness.   Respiratory: Negative for apnea, cough and shortness of breath.    Cardiovascular: Negative for chest pain and palpitations.   Gastrointestinal: Positive for abdominal pain (improving s/p stent). Negative for constipation, diarrhea, nausea and vomiting.   Endocrine: Negative for polydipsia and polyuria.   Genitourinary: Positive for decreased urine volume.   Musculoskeletal: Positive for back pain. Negative for neck pain.   Skin: Negative for color change and rash.   Neurological: Negative for light-headedness and headaches.   Hematological: Negative for adenopathy. Does not bruise/bleed easily.   Psychiatric/Behavioral: Negative for confusion. The patient is not nervous/anxious.      Objective:     Vital Signs (Most Recent):  Temp: 99.3 °F (37.4 °C) (01/18/19 1536)  Pulse: 106 (01/18/19 1536)  Resp: 18 (01/18/19 1536)  BP: 131/79 (01/18/19 1536)  SpO2: (!) 90 % (01/18/19 1536) Vital Signs (24h Range):  Temp:  [96 °F (35.6 °C)-99.7 °F (37.6 °C)] 99.3 °F (37.4 °C)  Pulse:  [] 106  Resp:  [18] 18  SpO2:  [90 %-96 %] 90 %  BP: (113-132)/(57-79) 131/79     Weight: 72.2 kg (159 lb 2 oz)  Body mass index is 25.68 kg/m².    Intake/Output Summary (Last 24 hours) at 1/18/2019 1539  Last data filed at 1/18/2019 0731  Gross per 24 hour   Intake --   Output 850 ml   Net -850 ml      Physical Exam   Constitutional: He is oriented to person, place, and time. He appears well-developed and well-nourished. No  distress.   HENT:   Head: Normocephalic and atraumatic.   Mouth/Throat: Oropharynx is clear and moist.   Eyes: Conjunctivae and EOM are normal.   Neck: Normal range of motion. Neck supple.   Cardiovascular: Regular rhythm and normal heart sounds. Tachycardia present. Exam reveals no gallop and no friction rub.   No murmur heard.  Pulmonary/Chest: Effort normal and breath sounds normal. No stridor. No respiratory distress. He has no wheezes. He has no rales.   Abdominal: Soft. Bowel sounds are normal. He exhibits no distension. There is tenderness (RLQ about improved s/p stent). There is no guarding.   Musculoskeletal: He exhibits deformity. He exhibits no edema or tenderness.   Left upper extremity held in contracture   Neurological: He is alert and oriented to person, place, and time.   Skin: Skin is warm and dry. No rash noted. He is not diaphoretic.   Psychiatric: He has a normal mood and affect. His behavior is normal.       Significant Labs:   CBC:   Recent Labs   Lab 01/17/19  0031 01/17/19  0445 01/18/19  0455   WBC 23.65* 23.04* 24.46*   HGB 7.9* 7.3* 6.6*   HCT 21.5* 20.5* 18.6*    233 208     CMP:   Recent Labs   Lab 01/17/19  0031 01/17/19  0445 01/17/19  1250 01/18/19  0456   *  --  138 135*   K 4.8  --  4.9 4.3   CL 99  --  102 101   CO2 18*  --  22* 24   GLU 86  --  97 101   BUN 45*  --  53* 63*   CREATININE 4.7*  --  5.7* 6.3*   CALCIUM 9.4  --  9.5 9.4   PROT 6.0 5.6*  --  5.2*   ALBUMIN 2.6* 2.4* 2.3* 2.1*  2.1*   BILITOT 46.5* 46.3*  --  44.0*   ALKPHOS 397* 392*  --  341*   * 141*  --  97*   * 123*  --  91*   ANIONGAP 18*  --  14 10   EGFRNONAA 15.7*  --  12.4* 11.0*           Assessment/Plan:      * Acute pancreatitis    Patient with gallstone pancreatitis admitted on 1/13 and treated with antibiotics (zosyn) and IV fluids. Currently only has minimal pain and no nausea. Transferred here for AES evaluation.     - pain control with oral agents,   - careful IV fluids  "given severe oliguria and dialysis requirement. Will continue to monitor fluid requirement  - Continued Zosyn   - blood cultures x2, lactic acid, lipase, CMP ordered  - consulted AES. Pt is s/p biliary stent  - advance diet as tolerated  - continued heparin         Acute renal failure superimposed on stage 4 chronic kidney disease    Baseline creatinine reportedly 2.5-2.6, up to 7.0 prior to transfer. Cr uptrending from yesterday. Will continue to monitor.    - nephrology consulted, managing dialysis  - CXR ordered to confirm placement of trialysis catheter in right IJ  - renal labs ordered  - will continue to monitor     Chronic gout of multiple sites    Home medication: allopurinol 100mg daily    - continue       Sickle cell disease without crisis    IRON OVERLOAD    Patient reports innumerable transfusions over the past year. Baseline hemoglobin ~9. Reports he was recently started on Epo injections three times weekly.     Home medications: L-glutamine, exjade. Does not take hydroxyurea "because it does not agree with him". Takes norco about 4 times per week as needed for pain.     - holding exjade, contraindicated with renal failure  - L-glutamine not available in formulary, will have patient take his own medication  - retic count ordered  - Hgb dropped below 7 today. Pt consented for blood. Will transfuse 1 unit pRBC               VTE Risk Mitigation (From admission, onward)        Ordered     heparin (porcine) injection 5,000 Units  Every 8 hours      01/18/19 1545     IP VTE LOW RISK PATIENT  Once      01/17/19 0006     Place sequential compression device  Until discontinued      01/17/19 0006              Alvaro Mtz MD  Department of Hospital Medicine   [facility]  "

## 2019-01-18 NOTE — PT/OT/SLP EVAL
Occupational Therapy   Evaluation and Discharge Note    Name: Ambrosio Newman  MRN: [id number]  Admitting Diagnosis:  Acute pancreatitis 1 Day Post-Op    Recommendations:     Discharge Recommendations: (home)  Discharge Equipment Recommendations:  none  Barriers to discharge:  None    Assessment:     Ambrosio Newman is a 28 y.o. male with a medical diagnosis of Acute pancreatitis. At this time, patient is functioning at their prior level of function and does not require further acute OT services.     Plan:     During this hospitalization, patient does not require further acute OT services.  Please re-consult if situation changes.    · Plan of Care Reviewed with: patient, mother    Subjective     Chief Complaint: fatigue  Patient/Family Comments/goals: return to PLOF    Occupational Profile:  Living Environment: Pt lives with mother in 1SH with 3STE and 0HR. Home has tub  Previous level of function: PTA, pt reports independence with all ADL and IADL. Pt was not using any AD for ambulation or ADL  Roles and Routines: son  Equipment Used at home:  none  Assistance upon Discharge: Mother to assist as needed following d/c     Pain/Comfort:  · Pain Rating 1: 0/10  · Pain Addressed 1: Reposition, Distraction, Cessation of Activity  · Pain Rating Post-Intervention 1: 0/10    Patients cultural, spiritual, Rastafari conflicts given the current situation: no    Objective:     Communicated with: RN prior to session.  Patient found HOB elevated with peripheral IV upon OT entry to room.    General Precautions: Standard, fall   Orthopedic Precautions:N/A   Braces: N/A     Occupational Performance:    Bed Mobility:    · Patient completed Supine to Sit with modified independence and with side rail    Functional Mobility/Transfers:  · Patient completed Sit <> Stand Transfer with stand by assistance  with  no assistive device   · Functional Mobility: Pt ambulate 100 ft with SBA and use of IV pole for support    Activities of Daily  Living:  · Lower Body Dressing: maximal assistance while seated EOB 2/2 fatigue    Cognitive/Visual Perceptual:  Cognitive/Psychosocial Skills:     -       Oriented to: Person, Place, Time and Situation   -       Follows Commands/attention:Follows multistep  commands  -       Communication: clear/fluent  -       Memory: No Deficits noted  -       Safety awareness/insight to disability: intact   -       Mood/Affect/Coping skills/emotional control: Appropriate to situation  Visual/Perceptual:      -Intact vision and hearing    Physical Exam:  Balance:    -       good sitting/standing balance  Postural examination/scapula alignment:    -       No postural abnormalities identified  Skin integrity: Visible skin intact  Edema:  None noted  Sensation:    -       Intact  Dominant hand:    -       R hand  Upper Extremity Range of Motion:     -       Right Upper Extremity: WFL  -       Left Upper Extremity: AROM for shoulder flexion to 30*, WFL at elbow, wrist has flexion contracture, PROM at digits (CVA as a child)  Upper Extremity Strength:    -       Right Upper Extremity: WFL  -       Left Upper Extremity: shoulder flexion 3/5, elbow flexion 4/5   Strength:    -       Right Upper Extremity: WFL  -       Left Upper Extremity: 1/5  Fine Motor Coordination:    -       Impaired  limited use of LUE  Gross motor coordination:   WFL    AMPAC 6 Click ADL:  AMPAC Total Score: 23    Treatment & Education:  Pt educated on role of OT/POC  Pt educated on importance of ambulation/UIC  Pt/mother educated on and in agreement with d/c from acute OT - mother reports pt very independent, just weak today 2/2 needing blood and dialysis  White board/communication board updated  Education:    Patient left seated EOB with all lines intact, call button in reach and mother present    GOALS:   Multidisciplinary Problems     Occupational Therapy Goals     Not on file          Multidisciplinary Problems (Resolved)        Problem: Occupational  "Therapy Goal    Goal Priority Disciplines Outcome Interventions   Occupational Therapy Goal   (Resolved)     OT, PT/OT Outcome(s) achieved                    History:     Past Medical History:   Diagnosis Date    Chronic gout of multiple sites 2019    CKD (chronic kidney disease), stage IV     CVA (cerebral vascular accident)     Gout     HTN (hypertension)     Iron overload     Iron overload due to repeated red blood cell transfusions 2019    Sickle cell anemia     Sickle cell disease without crisis 2019       Past Surgical History:   Procedure Laterality Date    CHOLECYSTECTOMY      ERCP (ENDOSCOPIC RETROGRADE CHOLANGIOPANCREATOGRAPHY) N/A 2019    Performed by Dickson Coto MD at [facility] (2ND FLR)    ruptured gastric ulcer with amy patch      TONSILLECTOMY      ULTRASOUND, ENDOSCOPIC, UPPER GI TRACT N/A 2019    Performed by Dickson Coto MD at [facility] (2ND FLR)       Clinical Decision Makin.  OT Low:  "Pt evaluation falls under low complexity for evaluation coding due to performance deficits noted in 1-3 areas as stated above and 0 co-morbities affecting current functional status. Data obtained from problem focused assessments. No modifications or assistance was required for completion of evaluation. Only brief occupational profile and history review completed."     Time Tracking:     OT Date of Treatment: 19  OT Start Time: 1118  OT Stop Time: 1127  OT Total Time (min): 9 min    Billable Minutes:Evaluation 9    Sharmaine Reid OT  2019    "

## 2019-01-18 NOTE — PHYSICIAN QUERY
"PT Name: Ambrosio Newman  MR #: [id number]    Physician Query Form - Hematology Clarification      CDS: Isma SYKES,RN   Contact information:[phone]  This form is a permanent document in the medical record.      Query Date: January 18, 2019    By submitting this query, we are merely seeking further clarification of documentation. Please utilize your independent clinical judgment when addressing the question(s) below.    The Medical record contains the following:   Indicators  Supporting Clinical Findings Location in Medical Record   x "Anemia" documented      Sickle cell anemia         1/16/19 Gastroenterology Subjective &Objective    x H & H =      HGB=7.9--->7.3---->6.6      HCT=21.5-->20.5--> 18.6              1/17------------------->1/18 Labs    BP =                     HR=      "GI bleeding" documented      Acute bleeding (Non GI site)     x Transfusion(s)     Recommend heme onc consult       regarding management of sickle cell      disease, concern for active sickling, Hb      6.6 today requiring transfusion              1/18/19  Nephrology Assessment &     Plan Note        x Treatment:     deferasirox disintegrating tablet 20 mg/kg    (Dosing Weight) Dose: 20 mg/kg    Weight Dosing Info: No weight recorded    Freq: Before breakfast Route: Oral                1/16------------------------>1/18 MAR   x Other:      Iron overload due to repeated red blood      cell transfusions       1/17/19 Gastroenterology Subjective & Objective     Provider, please specify diagnosis or diagnoses associated with above clinical findings.    [  ] Iron deficiency anemia     [ X ] Anemia of chronic disease ( Specify chronic disease)       [ X ] Other (Specify): sickle cell anemia   [  ] Clinically Undetermined       [  ] Other Hematological Diagnosis (please specify):     [  ] Clinically Undetermined       Please document in your progress notes daily for the duration of treatment, until resolved, and include in your " discharge summary.

## 2019-01-18 NOTE — TREATMENT PLAN
AES Follow-up Note    S/P EUS/ERCP.  Impression:   - Duodenal deformity.                        - The major papilla appeared to be bulging.                        - The entire main bile duct was mildly dilated.                        - Choledocholithiasis was found. Complete removal                         was accomplished by biliary sphincterotomy and                         balloon extraction.                        - A biliary sphincterotomy was performed.                        - The biliary tree was swept.                        - One biliary stent was placed into the common bile                         duct (10 Fr by 7 cm) to maintain adequate drainage                         post endoscopic manipulation.  Recommendation:                           - Repeat ERCP in 10 weeks to remove stent.                        - Continue antibiotics for 7-10 days.    AES will sign off. Please call if any questions/concerns.     Maria Alejandra Aguilera MD  Gastroenterology & Hepatology Fellow   Pager: [phone]

## 2019-01-18 NOTE — ASSESSMENT & PLAN NOTE
28 M who was transferred overnight from OSH for gallstone pancreatitis AES evaluation.  CKD4 per chart, 1st time dialysis on 1/16 for Cr 7 and oliguria.  Baseline Cr reportedly ~2.5 per transfer note.  Cr 4.7 at time of admission pending AM labs.  He remains oliguric w/ 100mL recorded output since arriving, however no AMS or resp distress.  RIJ trialysis is in place.  He has no signs/symptoms of uremia.  He does not appear or sound fluid overloaded on exam, however CXR consistent w/ congestive failure likely due to volume overload.    Plan  -Recommend heme onc consult regarding management of sickle cell disease, concern for active sickling, Hb 6.6 today requiring transfusion  -In setting of pulmonary edema and prior oliguria, considering lasix challenge, however do not want to give lasix if concern for acute chest syndrome; will need to be assessed by staff nephrologist Dr. Wright (please see attestation)  -Oliguria improved.  UO 800mL yesterday and 450mL today without diuretics  -Kidney function worsening.  Cr 6.3 up from 5.7 and 4.7.  BUN 63.  -Will continue to monitor daily labs and fluid status, dialyzing as needed, possibly today, will discuss case w/ nephrology team

## 2019-01-18 NOTE — PLAN OF CARE
Cuauhtemoc Posada MD   [address]  [address] [facility] / *     Payor: [employer] MANAGED MEDICARE / Plan: [employer] MEDICARE HMO / Product Type: Capitation /         01/18/19 1541   Discharge Assessment   Assessment Type Discharge Planning Assessment   Confirmed/corrected address and phone number on facesheet? Yes   Assessment information obtained from? Patient;Caregiver   Expected Length of Stay (days) 5   Communicated expected length of stay with patient/caregiver yes   Prior to hospitilization cognitive status: Alert/Oriented   Prior to hospitalization functional status: Independent   Current cognitive status: Alert/Oriented   Current Functional Status: Independent   Lives With parent(s)   Able to Return to Prior Arrangements yes   Is patient able to care for self after discharge? Yes   Who are your caregiver(s) and their phone number(s)? Chanel Newman (mother) [phone]   Patient's perception of discharge disposition home or selfcare   Readmission Within the Last 30 Days no previous admission in last 30 days   Patient currently being followed by outpatient case management? No   Patient currently receives any other outside agency services? No   Equipment Currently Used at Home none   Do you have any problems affording any of your prescribed medications? No   Is the patient taking medications as prescribed? yes   Does the patient have transportation home? Yes   Transportation Anticipated family or friend will provide   Does the patient receive services at the Coumadin Clinic? No   Discharge Plan A Home with family   Patient/Family in Agreement with Plan yes

## 2019-01-18 NOTE — SUBJECTIVE & OBJECTIVE
Interval History: No acute events overnight, pt tolerated AES w/ stending yesterday.  Total urine output 800mL yesterday, 450mL so far today.  No dialysis yet.  Kidney function worsened w/ Cr 6.3 up from 4.7.  Primary complaint is fatigue.    Review of patient's allergies indicates:   Allergen Reactions    Bactrim [sulfamethoxazole-trimethoprim] Hives     Current Facility-Administered Medications   Medication Frequency    0.9%  NaCl infusion (for blood administration) Q24H PRN    allopurinol tablet 100 mg Daily    dextrose 50% injection 12.5 g PRN    dextrose 50% injection 25 g PRN    glucagon (human recombinant) injection 1 mg PRN    glucose chewable tablet 16 g PRN    glucose chewable tablet 24 g PRN    HYDROcodone-acetaminophen  mg per tablet 1 tablet Q6H PRN    HYDROcodone-acetaminophen 5-325 mg per tablet 1 tablet Q6H PRN    magnesium oxide tablet 800 mg BID    ondansetron disintegrating tablet 8 mg Q8H PRN    piperacillin-tazobactam 4.5 g in sodium chloride 0.9% 100 mL IVPB (ready to mix system) Q12H    ramelteon tablet 8 mg Nightly PRN    senna-docusate 8.6-50 mg per tablet 1 tablet BID    sodium chloride 0.9% flush 5 mL PRN       Objective:     Vital Signs (Most Recent):  Temp: 96 °F (35.6 °C) (01/18/19 0745)  Pulse: 95 (01/18/19 0745)  Resp: 18 (01/18/19 0745)  BP: (!) 113/57 (01/18/19 0745)  SpO2: (!) 91 % (01/18/19 0745)  O2 Device (Oxygen Therapy): nasal cannula (01/18/19 0745) Vital Signs (24h Range):  Temp:  [96 °F (35.6 °C)-99.7 °F (37.6 °C)] 96 °F (35.6 °C)  Pulse:  [] 95  Resp:  [7-23] 18  SpO2:  [84 %-93 %] 91 %  BP: (113-157)/(57-91) 113/57     Weight: 72.2 kg (159 lb 2 oz) (01/17/19 0348)  Body mass index is 25.68 kg/m².  Body surface area is 1.83 meters squared.    I/O last 3 completed shifts:  In: 830 [P.O.:25; I.V.:805]  Out: 900 [Urine:900]    Physical Exam   Constitutional: He is oriented to person, place, and time. He appears well-developed and well-nourished. No  distress.   HENT:   Head: Normocephalic and atraumatic.   Mouth/Throat: Oropharynx is clear and moist.   Eyes: Conjunctivae and EOM are normal.   Neck: Normal range of motion. Neck supple.   Cardiovascular: Regular rhythm and normal heart sounds. Tachycardia present. Exam reveals no gallop and no friction rub.   No murmur heard.  Pulmonary/Chest: Effort normal and breath sounds normal. No stridor. No respiratory distress. He has no wheezes. He has no rales.   Abdominal: Soft. Bowel sounds are normal. He exhibits no distension. There is tenderness (mild epigastric). There is no guarding.   Musculoskeletal: He exhibits deformity. He exhibits no edema or tenderness.   Left upper extremity held in contracture   Neurological: He is alert and oriented to person, place, and time.   Skin: Skin is warm and dry. No rash noted. He is not diaphoretic.   Psychiatric: He has a normal mood and affect. His behavior is normal.       Significant Labs:  CBC:   Recent Labs   Lab 01/18/19  0455   WBC 24.46*   RBC 2.36*   HGB 6.6*   HCT 18.6*      MCV 79*   MCH 28.0   MCHC 35.5     CMP:   Recent Labs   Lab 01/18/19  0456      CALCIUM 9.4   ALBUMIN 2.1*  2.1*   PROT 5.2*   *   K 4.3   CO2 24      BUN 63*   CREATININE 6.3*   ALKPHOS 341*   ALT 91*   AST 97*   BILITOT 44.0*     LFTs:   Recent Labs   Lab 01/18/19  0456   ALT 91*   AST 97*   ALKPHOS 341*   BILITOT 44.0*   PROT 5.2*   ALBUMIN 2.1*  2.1*     All labs within the past 24 hours have been reviewed.     Significant Imaging:  NA

## 2019-01-18 NOTE — PLAN OF CARE
Problem: Adult Inpatient Plan of Care  Goal: Plan of Care Review  Outcome: Ongoing (interventions implemented as appropriate)  Pt verbalize understanding of ongoing plan of care

## 2019-01-18 NOTE — PHARMACY MED REC
"Admission Medication Reconciliation - Pharmacy Consult Note    The home medication history was taken by Genia Sierra CPhT.  Based on information gathered and subsequent review by the clinical pharmacist, the items below may need attention.     You may go to "Admission" then "Reconcile Home Medications" tabs to review and/or act upon these items.     Potentially problematic discrepancies with current MAR  o Patient IS taking the following which was not ordered upon admit  o Carvedilol 12.5 daily  o Calcitriol 0.25 mg on Monday, Wednesday, Friday  o Folic Acid 1 mg tablet daily  o Colchicine 0.6 mg daily - dose not appropriate for his renal function at this moment, if resuming can start at 0.3 mg twice weekly    Please address this information as you see fit.  Feel free to contact us if you have any questions or require assistance.    Melva Brown, PharmD  EXT [phone]                .    .          "

## 2019-01-19 LAB
ALBUMIN SERPL BCP-MCNC: 2.1 G/DL
ALBUMIN SERPL BCP-MCNC: 2.1 G/DL
ALBUMIN SERPL BCP-MCNC: 2.2 G/DL
ALP SERPL-CCNC: 250 U/L
ALT SERPL W/O P-5'-P-CCNC: 70 U/L
ANION GAP SERPL CALC-SCNC: 10 MMOL/L
ANION GAP SERPL CALC-SCNC: 10 MMOL/L
ANISOCYTOSIS BLD QL SMEAR: SLIGHT
AST SERPL-CCNC: 58 U/L
BASO STIPL BLD QL SMEAR: ABNORMAL
BASOPHILS # BLD AUTO: 0.09 K/UL
BASOPHILS NFR BLD: 0.4 %
BILIRUB DIRECT SERPL-MCNC: >14 MG/DL
BILIRUB SERPL-MCNC: 21.2 MG/DL
BUN SERPL-MCNC: 68 MG/DL
BUN SERPL-MCNC: 72 MG/DL
CALCIUM SERPL-MCNC: 9.2 MG/DL
CALCIUM SERPL-MCNC: 9.4 MG/DL
CHLORIDE SERPL-SCNC: 100 MMOL/L
CHLORIDE SERPL-SCNC: 101 MMOL/L
CHOLEST SERPL-MCNC: 176 MG/DL
CHOLEST/HDLC SERPL: 29.3 {RATIO}
CO2 SERPL-SCNC: 24 MMOL/L
CO2 SERPL-SCNC: 24 MMOL/L
CREAT SERPL-MCNC: 6.2 MG/DL
CREAT SERPL-MCNC: 6.2 MG/DL
DIFFERENTIAL METHOD: ABNORMAL
EOSINOPHIL # BLD AUTO: 2 K/UL
EOSINOPHIL NFR BLD: 8.4 %
ERYTHROCYTE [DISTWIDTH] IN BLOOD BY AUTOMATED COUNT: 18.6 %
EST. GFR  (AFRICAN AMERICAN): 13 ML/MIN/1.73 M^2
EST. GFR  (AFRICAN AMERICAN): 13 ML/MIN/1.73 M^2
EST. GFR  (NON AFRICAN AMERICAN): 11.2 ML/MIN/1.73 M^2
EST. GFR  (NON AFRICAN AMERICAN): 11.2 ML/MIN/1.73 M^2
GLUCOSE SERPL-MCNC: 116 MG/DL
GLUCOSE SERPL-MCNC: 90 MG/DL
HCT VFR BLD AUTO: 20 %
HDLC SERPL-MCNC: 6 MG/DL
HDLC SERPL: 3.4 %
HGB BLD-MCNC: 7 G/DL
HYPOCHROMIA BLD QL SMEAR: ABNORMAL
IMM GRANULOCYTES # BLD AUTO: 0.5 K/UL
IMM GRANULOCYTES NFR BLD AUTO: 2.1 %
LDLC SERPL CALC-MCNC: 114.6 MG/DL
LYMPHOCYTES # BLD AUTO: 4.8 K/UL
LYMPHOCYTES NFR BLD: 20.1 %
MAGNESIUM SERPL-MCNC: 1.4 MG/DL
MCH RBC QN AUTO: 27.6 PG
MCHC RBC AUTO-ENTMCNC: 35 G/DL
MCV RBC AUTO: 79 FL
MONOCYTES # BLD AUTO: 3.4 K/UL
MONOCYTES NFR BLD: 14 %
NEUTROPHILS # BLD AUTO: 13.2 K/UL
NEUTROPHILS NFR BLD: 55 %
NONHDLC SERPL-MCNC: 170 MG/DL
NRBC BLD-RTO: 2 /100 WBC
PAPPENHEIMER BOD BLD QL SMEAR: PRESENT
PHOSPHATE SERPL-MCNC: 2.9 MG/DL
PHOSPHATE SERPL-MCNC: 3 MG/DL
PLATELET # BLD AUTO: 170 K/UL
PLATELET BLD QL SMEAR: ABNORMAL
PMV BLD AUTO: 11.3 FL
POIKILOCYTOSIS BLD QL SMEAR: ABNORMAL
POLYCHROMASIA BLD QL SMEAR: ABNORMAL
POTASSIUM SERPL-SCNC: 4.1 MMOL/L
POTASSIUM SERPL-SCNC: 4.2 MMOL/L
PROT SERPL-MCNC: 5.7 G/DL
RBC # BLD AUTO: 2.54 M/UL
SICKLE CELLS BLD QL SMEAR: ABNORMAL
SODIUM SERPL-SCNC: 134 MMOL/L
SODIUM SERPL-SCNC: 135 MMOL/L
TARGETS BLD QL SMEAR: ABNORMAL
TRIGL SERPL-MCNC: 277 MG/DL
WBC # BLD AUTO: 23.93 K/UL

## 2019-01-19 PROCEDURE — 11000001 HC ACUTE MED/SURG PRIVATE ROOM

## 2019-01-19 PROCEDURE — 25000003 PHARM REV CODE 250: Performed by: STUDENT IN AN ORGANIZED HEALTH CARE EDUCATION/TRAINING PROGRAM

## 2019-01-19 PROCEDURE — 99232 PR SUBSEQUENT HOSPITAL CARE,LEVL II: ICD-10-PCS | Mod: GC,,, | Performed by: INTERNAL MEDICINE

## 2019-01-19 PROCEDURE — 84075 ASSAY ALKALINE PHOSPHATASE: CPT

## 2019-01-19 PROCEDURE — 86706 HEP B SURFACE ANTIBODY: CPT

## 2019-01-19 PROCEDURE — 99222 1ST HOSP IP/OBS MODERATE 55: CPT | Mod: GC,,, | Performed by: INTERNAL MEDICINE

## 2019-01-19 PROCEDURE — 99222 PR INITIAL HOSPITAL CARE,LEVL II: ICD-10-PCS | Mod: GC,,, | Performed by: INTERNAL MEDICINE

## 2019-01-19 PROCEDURE — 83020 HEMOGLOBIN ELECTROPHORESIS: CPT | Mod: 91

## 2019-01-19 PROCEDURE — 63600175 PHARM REV CODE 636 W HCPCS: Performed by: STUDENT IN AN ORGANIZED HEALTH CARE EDUCATION/TRAINING PROGRAM

## 2019-01-19 PROCEDURE — 83020 HEMOGLOBIN ELECTROPHORESIS: CPT

## 2019-01-19 PROCEDURE — 87340 HEPATITIS B SURFACE AG IA: CPT

## 2019-01-19 PROCEDURE — 80069 RENAL FUNCTION PANEL: CPT | Mod: 91

## 2019-01-19 PROCEDURE — 80061 LIPID PANEL: CPT

## 2019-01-19 PROCEDURE — 80069 RENAL FUNCTION PANEL: CPT

## 2019-01-19 PROCEDURE — 85025 COMPLETE CBC W/AUTO DIFF WBC: CPT

## 2019-01-19 PROCEDURE — 86704 HEP B CORE ANTIBODY TOTAL: CPT

## 2019-01-19 PROCEDURE — 83735 ASSAY OF MAGNESIUM: CPT

## 2019-01-19 PROCEDURE — 86580 TB INTRADERMAL TEST: CPT | Performed by: STUDENT IN AN ORGANIZED HEALTH CARE EDUCATION/TRAINING PROGRAM

## 2019-01-19 PROCEDURE — 86709 HEPATITIS A IGM ANTIBODY: CPT

## 2019-01-19 PROCEDURE — 99232 SBSQ HOSP IP/OBS MODERATE 35: CPT | Mod: GC,,, | Performed by: INTERNAL MEDICINE

## 2019-01-19 RX ADMIN — HEPARIN SODIUM 5000 UNITS: 5000 INJECTION, SOLUTION INTRAVENOUS; SUBCUTANEOUS at 01:01

## 2019-01-19 RX ADMIN — HEPARIN SODIUM 5000 UNITS: 5000 INJECTION, SOLUTION INTRAVENOUS; SUBCUTANEOUS at 09:01

## 2019-01-19 RX ADMIN — HYDROCODONE BITARTRATE AND ACETAMINOPHEN 1 TABLET: 10; 325 TABLET ORAL at 05:01

## 2019-01-19 RX ADMIN — MAGNESIUM OXIDE TAB 400 MG (241.3 MG ELEMENTAL MG) 800 MG: 400 (241.3 MG) TAB at 08:01

## 2019-01-19 RX ADMIN — Medication 5 UNITS: at 01:01

## 2019-01-19 RX ADMIN — PIPERACILLIN AND TAZOBACTAM 4.5 G: 4; .5 INJECTION, POWDER, LYOPHILIZED, FOR SOLUTION INTRAVENOUS; PARENTERAL at 05:01

## 2019-01-19 RX ADMIN — STANDARDIZED SENNA CONCENTRATE AND DOCUSATE SODIUM 1 TABLET: 8.6; 5 TABLET, FILM COATED ORAL at 09:01

## 2019-01-19 RX ADMIN — ATORVASTATIN CALCIUM 40 MG: 20 TABLET, FILM COATED ORAL at 08:01

## 2019-01-19 RX ADMIN — ASPIRIN 81 MG CHEWABLE TABLET 81 MG: 81 TABLET CHEWABLE at 08:01

## 2019-01-19 RX ADMIN — HEPARIN SODIUM 5000 UNITS: 5000 INJECTION, SOLUTION INTRAVENOUS; SUBCUTANEOUS at 06:01

## 2019-01-19 RX ADMIN — PIPERACILLIN AND TAZOBACTAM 4.5 G: 4; .5 INJECTION, POWDER, LYOPHILIZED, FOR SOLUTION INTRAVENOUS; PARENTERAL at 06:01

## 2019-01-19 NOTE — ASSESSMENT & PLAN NOTE
"IRON OVERLOAD    Patient reports innumerable transfusions over the past year. Baseline hemoglobin ~9. Reports he was recently started on Epo injections three times weekly.     Home medications: L-glutamine, exjade. Does not take hydroxyurea "because it does not agree with him". Takes norco about 4 times per week as needed for pain.     - holding exjade, contraindicated with renal failure  - L-glutamine not available in formulary, will have patient take his own medication  - retic count ordered  - Hgb dropped below 7 today. Pt consented for blood. Will transfuse 1 unit pRBC  - hematology consulted. Appreciate recs.          "

## 2019-01-19 NOTE — PROGRESS NOTES
[facility] Hospital Medicine  Progress Note    Patient Name: Ambrosio Newman  MRN: [id number]  Patient Class: IP- Inpatient   Admission Date: 1/16/2019  Length of Stay: 3 days  Attending Physician: Callie Grace MD  Primary Care Provider: Cuauhtemoc Posada MD    [facility] Medicine Team: [facility] HOSP MED 4 Alvaro Mtz MD    Subjective:     Principal Problem:Acute pancreatitis    HPI:  Mr. 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     Mr. Newman was admitted to [facility] on 1/13 with complaint of epigastric abdominal pain, nausea, vomiting, and chills. He was found to have lipase of 1123, and finding of cholelithiasis as well as stone in distal common duct on US of abdomen. Patient reports that he had laparoscopic cholecystectomy at another hospital last year. Mr. Newman was treated with IV Zosyn while at [facility] and had CT abdomen, which showed gallstones in gallbladder, surgically absent spleen, and normal appearance of pancreas. MRCP was performed on 1/14 with finding of dilated common bile duct (8mm), diffuse intrahepatic and extrahepatic biliary dilatation, and small common duct stone at distal common duct.     During his hospitalization, Mr. Newman was started on dialysis for the first time on 1/16 due to oliguria and rising creatinine (up to 7.0). He had trialysis catheter placed in right IJ.     Mr. Newman currently complains of back pain and mild epigastric abdominal pain. He denies chest pain, shortness of breath, cough, or nausea at this time. He has been NPO for several days and now complains of thirst. He denies fevers. Patient lives at home with his mother and is unemployed. He denies any tobacco, alcohol, or drug use.     Per  Dr. Laurent's transfer note:      LABS:         T. brie 36.6  Plt 197  INR 1.2  Cr 7.0  Hgb 8.3 and reported as stable over past 3 days        Interval History: Pt did well overnight. Tolerating diet well. Pts Cr did not up trend but continues to be higher than baseline.    Review of Systems   Constitutional: Negative for chills, fatigue, fever and unexpected weight change.   HENT: Negative for congestion, hearing loss, rhinorrhea, sore throat and voice change.    Eyes: Negative for pain and redness.   Respiratory: Negative for apnea, cough and shortness of breath.    Cardiovascular: Negative for chest pain and palpitations.   Gastrointestinal: Positive for abdominal pain (improving s/p stent). Negative for constipation, diarrhea, nausea and vomiting.   Endocrine: Negative for polydipsia and polyuria.   Genitourinary: Positive for decreased urine volume.   Musculoskeletal: Negative for back pain and neck pain.   Skin: Negative for color change and rash.   Neurological: Negative for light-headedness and headaches.   Hematological: Negative for adenopathy. Does not bruise/bleed easily.   Psychiatric/Behavioral: Negative for confusion. The patient is not nervous/anxious.      Objective:     Vital Signs (Most Recent):  Temp: 97.4 °F (36.3 °C) (01/19/19 0838)  Pulse: 91 (01/19/19 0838)  Resp: 18 (01/19/19 0838)  BP: 126/80 (01/19/19 0838)  SpO2: (!) 90 % (01/19/19 0838) Vital Signs (24h Range):  Temp:  [97 °F (36.1 °C)-99.3 °F (37.4 °C)] 97.4 °F (36.3 °C)  Pulse:  [] 91  Resp:  [18] 18  SpO2:  [89 %-96 %] 90 %  BP: (126-142)/(65-92) 126/80     Weight: 72.2 kg (159 lb 2 oz)  Body mass index is 25.68 kg/m².    Intake/Output Summary (Last 24 hours) at 1/19/2019 0843  Last data filed at 1/18/2019 1730  Gross per 24 hour   Intake 600 ml   Output --   Net 600 ml      Physical Exam   Constitutional: He is oriented to person, place, and time. He appears well-developed and well-nourished. No distress.    HENT:   Head: Normocephalic and atraumatic.   Mouth/Throat: Oropharynx is clear and moist.   Eyes: Conjunctivae and EOM are normal.   Neck: Normal range of motion. Neck supple.   Cardiovascular: Regular rhythm and normal heart sounds. Tachycardia present. Exam reveals no gallop and no friction rub.   No murmur heard.  Pulmonary/Chest: Effort normal and breath sounds normal. No stridor. No respiratory distress. He has no wheezes. He has no rales.   Abdominal: Soft. Bowel sounds are normal. He exhibits no distension. There is tenderness (RLQ about improved s/p stent). There is no guarding.   Musculoskeletal: He exhibits deformity. He exhibits no edema or tenderness.   Left upper extremity held in contracture   Neurological: He is alert and oriented to person, place, and time.   Skin: Skin is warm and dry. No rash noted. He is not diaphoretic.   Generalized swelling 2/2 to fluid on exam   Psychiatric: He has a normal mood and affect. His behavior is normal.       Significant Labs:   CBC:   Recent Labs   Lab 01/18/19  0455 01/19/19  0414   WBC 24.46* 23.93*   HGB 6.6* 7.0*   HCT 18.6* 20.0*    170     CMP:   Recent Labs   Lab 01/17/19  1250 01/18/19  0456 01/19/19  0414    135* 135*   K 4.9 4.3 4.2    101 101   CO2 22* 24 24   GLU 97 101 90   BUN 53* 63* 68*   CREATININE 5.7* 6.3* 6.2*   CALCIUM 9.5 9.4 9.2   PROT  --  5.2* 5.7*   ALBUMIN 2.3* 2.1*  2.1* 2.1*  2.1*   BILITOT  --  44.0* 21.2*   ALKPHOS  --  341* 250*   AST  --  97* 58*   ALT  --  91* 70*   ANIONGAP 14 10 10   EGFRNONAA 12.4* 11.0* 11.2*           Assessment/Plan:      * Acute pancreatitis    Patient with gallstone pancreatitis admitted on 1/13 and treated with antibiotics (zosyn) and IV fluids. Currently only has minimal pain and no nausea. Transferred here for AES evaluation.     - pain control with oral agents,   - careful IV fluids given severe oliguria and dialysis requirement. Will continue to monitor fluid requirement  -  "Continued Zosyn   - blood cultures x2, lactic acid, lipase, CMP   - consulted AES. Pt is s/p biliary stent  - advance diet as tolerated  - continued heparin  - pt requires repeat ERCP with AES in 10 weeks to remove stent  - 10 days of antbiobiots       Common bile duct stone    - s/p ERCP and stent placement with AES. Repeat ERCP in 10 weeks for stent removal        Acute renal failure superimposed on stage 4 chronic kidney disease    Baseline creatinine reportedly 2.5-2.6, up to 7.0 prior to transfer. Cr continues to be elevated compared to baseline. Will continue to monitor.    - pt has swelling on exam  - nephrology consulted, managing dialysis  - CXR ordered to confirm placement of trialysis catheter in right IJ  - renal labs ordered  - will continue to monitor     Chronic gout of multiple sites    Home medication: allopurinol 100mg daily    - continue       Sickle cell disease without crisis    IRON OVERLOAD    Patient reports innumerable transfusions over the past year. Baseline hemoglobin ~9. Reports he was recently started on Epo injections three times weekly.     Home medications: L-glutamine, exjade. Does not take hydroxyurea "because it does not agree with him". Takes norco about 4 times per week as needed for pain.     - holding exjade, contraindicated with renal failure  - L-glutamine not available in formulary, will have patient take his own medication  - retic count ordered  - Hgb dropped below 7 today. Pt consented for blood. Will transfuse 1 unit pRBC  - hematology consulted. Appreciate recs.               VTE Risk Mitigation (From admission, onward)        Ordered     heparin (porcine) injection 5,000 Units  Every 8 hours      01/18/19 1545     IP VTE LOW RISK PATIENT  Once      01/17/19 0006     Place sequential compression device  Until discontinued      01/17/19 0006              Alvaro Mtz MD  Department of Hospital Medicine   [facility]  "

## 2019-01-19 NOTE — ASSESSMENT & PLAN NOTE
Patient with gallstone pancreatitis admitted on 1/13 and treated with antibiotics (zosyn) and IV fluids. Currently only has minimal pain and no nausea. Transferred here for AES evaluation.     - pain control with oral agents,   - careful IV fluids given severe oliguria and dialysis requirement. Will continue to monitor fluid requirement  - Continued Zosyn   - blood cultures x2, lactic acid, lipase, CMP   - consulted AES. Pt is s/p biliary stent  - advance diet as tolerated  - continued heparin  - pt requires repeat ERCP with AES in 10 weeks to remove stent  - 10 days of antbiobiots

## 2019-01-19 NOTE — ASSESSMENT & PLAN NOTE
Baseline creatinine reportedly 2.5-2.6, up to 7.0 prior to transfer. Cr continues to be elevated compared to baseline. Will continue to monitor.    - pt has swelling on exam  - nephrology consulted, managing dialysis  - CXR ordered to confirm placement of trialysis catheter in right IJ  - renal labs ordered  - will continue to monitor

## 2019-01-19 NOTE — SUBJECTIVE & OBJECTIVE
Interval History: Pt did well overnight. Tolerating diet well. Pts Cr did not up trend but continues to be higher than baseline.    Review of Systems   Constitutional: Negative for chills, fatigue, fever and unexpected weight change.   HENT: Negative for congestion, hearing loss, rhinorrhea, sore throat and voice change.    Eyes: Negative for pain and redness.   Respiratory: Negative for apnea, cough and shortness of breath.    Cardiovascular: Negative for chest pain and palpitations.   Gastrointestinal: Positive for abdominal pain (improving s/p stent). Negative for constipation, diarrhea, nausea and vomiting.   Endocrine: Negative for polydipsia and polyuria.   Genitourinary: Positive for decreased urine volume.   Musculoskeletal: Negative for back pain and neck pain.   Skin: Negative for color change and rash.   Neurological: Negative for light-headedness and headaches.   Hematological: Negative for adenopathy. Does not bruise/bleed easily.   Psychiatric/Behavioral: Negative for confusion. The patient is not nervous/anxious.      Objective:     Vital Signs (Most Recent):  Temp: 97.4 °F (36.3 °C) (01/19/19 0838)  Pulse: 91 (01/19/19 0838)  Resp: 18 (01/19/19 0838)  BP: 126/80 (01/19/19 0838)  SpO2: (!) 90 % (01/19/19 0838) Vital Signs (24h Range):  Temp:  [97 °F (36.1 °C)-99.3 °F (37.4 °C)] 97.4 °F (36.3 °C)  Pulse:  [] 91  Resp:  [18] 18  SpO2:  [89 %-96 %] 90 %  BP: (126-142)/(65-92) 126/80     Weight: 72.2 kg (159 lb 2 oz)  Body mass index is 25.68 kg/m².    Intake/Output Summary (Last 24 hours) at 1/19/2019 0843  Last data filed at 1/18/2019 1730  Gross per 24 hour   Intake 600 ml   Output --   Net 600 ml      Physical Exam   Constitutional: He is oriented to person, place, and time. He appears well-developed and well-nourished. No distress.   HENT:   Head: Normocephalic and atraumatic.   Mouth/Throat: Oropharynx is clear and moist.   Eyes: Conjunctivae and EOM are normal.   Neck: Normal range of motion.  Neck supple.   Cardiovascular: Regular rhythm and normal heart sounds. Tachycardia present. Exam reveals no gallop and no friction rub.   No murmur heard.  Pulmonary/Chest: Effort normal and breath sounds normal. No stridor. No respiratory distress. He has no wheezes. He has no rales.   Abdominal: Soft. Bowel sounds are normal. He exhibits no distension. There is tenderness (RLQ about improved s/p stent). There is no guarding.   Musculoskeletal: He exhibits deformity. He exhibits no edema or tenderness.   Left upper extremity held in contracture   Neurological: He is alert and oriented to person, place, and time.   Skin: Skin is warm and dry. No rash noted. He is not diaphoretic.   Generalized swelling 2/2 to fluid on exam   Psychiatric: He has a normal mood and affect. His behavior is normal.       Significant Labs:   CBC:   Recent Labs   Lab 01/18/19  0455 01/19/19  0414   WBC 24.46* 23.93*   HGB 6.6* 7.0*   HCT 18.6* 20.0*    170     CMP:   Recent Labs   Lab 01/17/19  1250 01/18/19  0456 01/19/19  0414    135* 135*   K 4.9 4.3 4.2    101 101   CO2 22* 24 24   GLU 97 101 90   BUN 53* 63* 68*   CREATININE 5.7* 6.3* 6.2*   CALCIUM 9.5 9.4 9.2   PROT  --  5.2* 5.7*   ALBUMIN 2.3* 2.1*  2.1* 2.1*  2.1*   BILITOT  --  44.0* 21.2*   ALKPHOS  --  341* 250*   AST  --  97* 58*   ALT  --  91* 70*   ANIONGAP 14 10 10   EGFRNONAA 12.4* 11.0* 11.2*

## 2019-01-20 LAB
ALBUMIN SERPL BCP-MCNC: 2.3 G/DL
ALBUMIN SERPL BCP-MCNC: 2.3 G/DL
ALBUMIN SERPL BCP-MCNC: 2.4 G/DL
ALP SERPL-CCNC: 217 U/L
ALT SERPL W/O P-5'-P-CCNC: 56 U/L
ANION GAP SERPL CALC-SCNC: 10 MMOL/L
ANION GAP SERPL CALC-SCNC: 9 MMOL/L
ANISOCYTOSIS BLD QL SMEAR: ABNORMAL
AST SERPL-CCNC: 46 U/L
BASOPHILS # BLD AUTO: 0.11 K/UL
BASOPHILS NFR BLD: 0.5 %
BILIRUB DIRECT SERPL-MCNC: 6.7 MG/DL
BILIRUB SERPL-MCNC: 9.4 MG/DL
BUN SERPL-MCNC: 73 MG/DL
BUN SERPL-MCNC: 75 MG/DL
CALCIUM SERPL-MCNC: 9.7 MG/DL
CALCIUM SERPL-MCNC: 9.7 MG/DL
CHLORIDE SERPL-SCNC: 101 MMOL/L
CHLORIDE SERPL-SCNC: 101 MMOL/L
CO2 SERPL-SCNC: 24 MMOL/L
CO2 SERPL-SCNC: 25 MMOL/L
CREAT SERPL-MCNC: 6.1 MG/DL
CREAT SERPL-MCNC: 6.3 MG/DL
DIFFERENTIAL METHOD: ABNORMAL
EOSINOPHIL # BLD AUTO: 2.5 K/UL
EOSINOPHIL NFR BLD: 10.2 %
ERYTHROCYTE [DISTWIDTH] IN BLOOD BY AUTOMATED COUNT: 19 %
EST. GFR  (AFRICAN AMERICAN): 12.7 ML/MIN/1.73 M^2
EST. GFR  (AFRICAN AMERICAN): 13.2 ML/MIN/1.73 M^2
EST. GFR  (NON AFRICAN AMERICAN): 11 ML/MIN/1.73 M^2
EST. GFR  (NON AFRICAN AMERICAN): 11.5 ML/MIN/1.73 M^2
GLUCOSE SERPL-MCNC: 101 MG/DL
GLUCOSE SERPL-MCNC: 127 MG/DL
HCT VFR BLD AUTO: 19.4 %
HGB BLD-MCNC: 6.9 G/DL
HYPOCHROMIA BLD QL SMEAR: ABNORMAL
IMM GRANULOCYTES # BLD AUTO: 0.51 K/UL
IMM GRANULOCYTES NFR BLD AUTO: 2.1 %
INR PPP: 1.1
LDH SERPL L TO P-CCNC: 364 U/L
LYMPHOCYTES # BLD AUTO: 5.7 K/UL
LYMPHOCYTES NFR BLD: 23.6 %
MAGNESIUM SERPL-MCNC: 1.8 MG/DL
MCH RBC QN AUTO: 27.7 PG
MCHC RBC AUTO-ENTMCNC: 35.6 G/DL
MCV RBC AUTO: 78 FL
MONOCYTES # BLD AUTO: 2.5 K/UL
MONOCYTES NFR BLD: 10.3 %
NEUTROPHILS # BLD AUTO: 12.9 K/UL
NEUTROPHILS NFR BLD: 53.3 %
NRBC BLD-RTO: 1 /100 WBC
OVALOCYTES BLD QL SMEAR: ABNORMAL
PHOSPHATE SERPL-MCNC: 3.7 MG/DL
PHOSPHATE SERPL-MCNC: 3.9 MG/DL
PLATELET # BLD AUTO: 141 K/UL
PMV BLD AUTO: 11.2 FL
POIKILOCYTOSIS BLD QL SMEAR: SLIGHT
POLYCHROMASIA BLD QL SMEAR: ABNORMAL
POTASSIUM SERPL-SCNC: 4.1 MMOL/L
POTASSIUM SERPL-SCNC: 4.4 MMOL/L
PROT SERPL-MCNC: 6.3 G/DL
PROTHROMBIN TIME: 11 SEC
RBC # BLD AUTO: 2.49 M/UL
SICKLE CELLS BLD QL SMEAR: ABNORMAL
SODIUM SERPL-SCNC: 134 MMOL/L
SODIUM SERPL-SCNC: 136 MMOL/L
TARGETS BLD QL SMEAR: ABNORMAL
WBC # BLD AUTO: 24.15 K/UL

## 2019-01-20 PROCEDURE — 25000003 PHARM REV CODE 250: Performed by: STUDENT IN AN ORGANIZED HEALTH CARE EDUCATION/TRAINING PROGRAM

## 2019-01-20 PROCEDURE — 11000001 HC ACUTE MED/SURG PRIVATE ROOM

## 2019-01-20 PROCEDURE — 84075 ASSAY ALKALINE PHOSPHATASE: CPT

## 2019-01-20 PROCEDURE — 99232 PR SUBSEQUENT HOSPITAL CARE,LEVL II: ICD-10-PCS | Mod: GC,,, | Performed by: INTERNAL MEDICINE

## 2019-01-20 PROCEDURE — 82247 BILIRUBIN TOTAL: CPT

## 2019-01-20 PROCEDURE — 63600175 PHARM REV CODE 636 W HCPCS: Performed by: STUDENT IN AN ORGANIZED HEALTH CARE EDUCATION/TRAINING PROGRAM

## 2019-01-20 PROCEDURE — 99232 SBSQ HOSP IP/OBS MODERATE 35: CPT | Mod: GC,,, | Performed by: INTERNAL MEDICINE

## 2019-01-20 PROCEDURE — 85025 COMPLETE CBC W/AUTO DIFF WBC: CPT

## 2019-01-20 PROCEDURE — 83735 ASSAY OF MAGNESIUM: CPT

## 2019-01-20 PROCEDURE — 85610 PROTHROMBIN TIME: CPT

## 2019-01-20 PROCEDURE — 80069 RENAL FUNCTION PANEL: CPT | Mod: 91

## 2019-01-20 PROCEDURE — 84145 PROCALCITONIN (PCT): CPT

## 2019-01-20 PROCEDURE — 83615 LACTATE (LD) (LDH) ENZYME: CPT

## 2019-01-20 RX ADMIN — STANDARDIZED SENNA CONCENTRATE AND DOCUSATE SODIUM 1 TABLET: 8.6; 5 TABLET, FILM COATED ORAL at 09:01

## 2019-01-20 RX ADMIN — HEPARIN SODIUM 5000 UNITS: 5000 INJECTION, SOLUTION INTRAVENOUS; SUBCUTANEOUS at 09:01

## 2019-01-20 RX ADMIN — ASPIRIN 81 MG CHEWABLE TABLET 81 MG: 81 TABLET CHEWABLE at 08:01

## 2019-01-20 RX ADMIN — HYDROCODONE BITARTRATE AND ACETAMINOPHEN 1 TABLET: 10; 325 TABLET ORAL at 11:01

## 2019-01-20 RX ADMIN — STANDARDIZED SENNA CONCENTRATE AND DOCUSATE SODIUM 1 TABLET: 8.6; 5 TABLET, FILM COATED ORAL at 08:01

## 2019-01-20 RX ADMIN — PIPERACILLIN AND TAZOBACTAM 4.5 G: 4; .5 INJECTION, POWDER, LYOPHILIZED, FOR SOLUTION INTRAVENOUS; PARENTERAL at 05:01

## 2019-01-20 RX ADMIN — ATORVASTATIN CALCIUM 40 MG: 20 TABLET, FILM COATED ORAL at 08:01

## 2019-01-20 RX ADMIN — HYDROCODONE BITARTRATE AND ACETAMINOPHEN 1 TABLET: 10; 325 TABLET ORAL at 07:01

## 2019-01-20 RX ADMIN — HEPARIN SODIUM 5000 UNITS: 5000 INJECTION, SOLUTION INTRAVENOUS; SUBCUTANEOUS at 05:01

## 2019-01-20 RX ADMIN — HEPARIN SODIUM 5000 UNITS: 5000 INJECTION, SOLUTION INTRAVENOUS; SUBCUTANEOUS at 02:01

## 2019-01-20 RX ADMIN — HYDROCODONE BITARTRATE AND ACETAMINOPHEN 1 TABLET: 10; 325 TABLET ORAL at 04:01

## 2019-01-20 RX ADMIN — PIPERACILLIN AND TAZOBACTAM 4.5 G: 4; .5 INJECTION, POWDER, LYOPHILIZED, FOR SOLUTION INTRAVENOUS; PARENTERAL at 04:01

## 2019-01-20 NOTE — PLAN OF CARE
[facility] updated by IM4 team that pt has completed AES w/u therefore pt can transfer back to previous hospital - [facility]. Pt still needs inpatient care to be followed by nephrology and HD chair setup - pt wants to transfer and be closer to home. CM contacted transfer center to initiate transfer.    Update: CM received call back from transfer center - MD and house supervisor have accepted pt but the policy at [facility] is that all transfers must go thru their case mgmt dept. Teresita w/ transfer [place] has left several 's w/ case mgmt. At this point, pt will likely transfer [facility] when case mgmt is available unless Teresita gets a call back. [facility] is keeping pt on their board.

## 2019-01-20 NOTE — SUBJECTIVE & OBJECTIVE
Interval History: Pt did well overnight. Tolerating diet well. No pain to abdominal palpation. Passing bowl movements well.     Review of Systems   Constitutional: Negative for chills, fatigue, fever and unexpected weight change.   HENT: Negative for congestion, hearing loss, rhinorrhea, sore throat and voice change.    Eyes: Negative for pain and redness.   Respiratory: Negative for apnea, cough and shortness of breath.    Cardiovascular: Negative for chest pain and palpitations.   Gastrointestinal: Positive for abdominal pain (improving s/p stent). Negative for constipation, diarrhea, nausea and vomiting.   Endocrine: Negative for polydipsia and polyuria.   Genitourinary: Positive for decreased urine volume.   Musculoskeletal: Negative for back pain and neck pain.   Skin: Negative for color change and rash.   Neurological: Negative for light-headedness and headaches.   Hematological: Negative for adenopathy. Does not bruise/bleed easily.   Psychiatric/Behavioral: Negative for confusion. The patient is not nervous/anxious.      Objective:     Vital Signs (Most Recent):  Temp: 96.4 °F (35.8 °C) (01/20/19 1311)  Pulse: 87 (01/20/19 1311)  Resp: 20 (01/20/19 1311)  BP: 130/76 (01/20/19 1311)  SpO2: (!) 93 % (01/20/19 1311) Vital Signs (24h Range):  Temp:  [96.4 °F (35.8 °C)-98.7 °F (37.1 °C)] 96.4 °F (35.8 °C)  Pulse:  [84-99] 87  Resp:  [16-20] 20  SpO2:  [90 %-95 %] 93 %  BP: (130-160)/(75-93) 130/76     Weight: 72.2 kg (159 lb 2 oz)  Body mass index is 25.68 kg/m².    Intake/Output Summary (Last 24 hours) at 1/20/2019 1328  Last data filed at 1/20/2019 0600  Gross per 24 hour   Intake 1610 ml   Output 1050 ml   Net 560 ml      Physical Exam   Constitutional: He is oriented to person, place, and time. He appears well-developed and well-nourished. No distress.   HENT:   Head: Normocephalic and atraumatic.   Mouth/Throat: Oropharynx is clear and moist.   Eyes: Conjunctivae and EOM are normal.   Neck: Normal range of  motion. Neck supple.   Cardiovascular: Regular rhythm and normal heart sounds. Tachycardia present. Exam reveals no gallop and no friction rub.   No murmur heard.  Pulmonary/Chest: Effort normal and breath sounds normal. No stridor. No respiratory distress. He has no wheezes. He has no rales.   Abdominal: Soft. Bowel sounds are normal. He exhibits no distension. There is tenderness (RLQ about improved s/p stent). There is no guarding.   Musculoskeletal: He exhibits deformity. He exhibits no edema or tenderness.   Left upper extremity held in contracture   Neurological: He is alert and oriented to person, place, and time.   Skin: Skin is warm and dry. No rash noted. He is not diaphoretic.   Generalized swelling 2/2 to fluid on exam   Psychiatric: He has a normal mood and affect. His behavior is normal.       Significant Labs:   CBC:   Recent Labs   Lab 01/19/19  0414 01/20/19  0830   WBC 23.93* 24.15*   HGB 7.0* 6.9*   HCT 20.0* 19.4*    141*     CMP:   Recent Labs   Lab 01/19/19  0414 01/19/19  1350 01/20/19  0830   * 134* 136   K 4.2 4.1 4.4    100 101   CO2 24 24 25   GLU 90 116* 101   BUN 68* 72* 73*   CREATININE 6.2* 6.2* 6.3*   CALCIUM 9.2 9.4 9.7   PROT 5.7*  --  6.3   ALBUMIN 2.1*  2.1* 2.2* 2.3*  2.3*   BILITOT 21.2*  --  9.4*   ALKPHOS 250*  --  217*   AST 58*  --  46*   ALT 70*  --  56*   ANIONGAP 10 10 10   EGFRNONAA 11.2* 11.2* 11.0*       Significant Imaging: I have reviewed and interpreted all pertinent imaging results/findings within the past 24 hours.

## 2019-01-20 NOTE — HOSPITAL COURSE
Pt transferred for advanced endoscopy evaluation for gallstone pancreatitis.  Consulted AES and pt had ERCP/EUS.  Stent was placed.  Bilirubin and transaminases steadily downtrending post procedure. Abdominal pain, N/V markedly improved. Has tolerated full diet since. GI recommendations:    - Repeat ERCP in 10 weeks to remove stent.    - Continue antibiotics for 7-10 days. (1/21 is day 5 of piperacillin/tazobactam)    Hemoglobin improved following 1 unit PRBCs. Consulted Hematology to assist with sickle cell disease, recommended against exchange transfusion. Endari and Exjade also held at their recommendation, until O/p f/u with his hematologist. Hgb was stable around 7 but dipped to 6.9 and 6.8 on 1/19 and 1/20 so he was transfused 1 additional unit PRBCs. No signs of blood loss in stool. Iron studies not performed as patient was s/p transfusion. Suspected 2/2 renal disease, sickle cell.     Nephrology consulted for renal failure.  No acute dialysis indications however nephrology though patient would benefit from clearance. Requested new line placement, refused by patient and family. Wanted to have further interventions performed in [place]. Outpatient HD chair labs ordered, pending.

## 2019-01-20 NOTE — PROGRESS NOTES
[facility] Hospital Medicine  Progress Note    Patient Name: Ambrosio Newman  MRN: [id number]  Patient Class: IP- Inpatient   Admission Date: 1/16/2019  Length of Stay: 4 days  Attending Physician: Callie Grace MD  Primary Care Provider: Cuauhtemoc Posada MD    [facility] Medicine Team: [facility] HOSP MED 4 Alvaro Mtz MD    Subjective:     Principal Problem:Acute pancreatitis    HPI:  Mr. Ambrosio Newman is a 28 year old male with sickle cell disease, history of CVA as a child with resultant left upper extremity weakness and chronic contracture, HTN, CKD stage IV, and gout who was transferred to [facility] from [facility] for AES evaluation of gallstone pancreatitis.     Mr. Newman was admitted to [facility] on 1/13 with complaint of epigastric abdominal pain, nausea, vomiting, and chills. He was found to have lipase of 1123, and finding of cholelithiasis as well as stone in distal common duct on US of abdomen. Patient reports that he had laparoscopic cholecystectomy at another hospital last year. Mr. Newman was treated with IV Zosyn while at [facility] and had CT abdomen, which showed gallstones in gallbladder, surgically absent spleen, and normal appearance of pancreas. MRCP was performed on 1/14 with finding of dilated common bile duct (8mm), diffuse intrahepatic and extrahepatic biliary dilatation, and small common duct stone at distal common duct.     During his hospitalization, Mr. Newman was started on dialysis for the first time on 1/16 due to oliguria and rising creatinine (up to 7.0). He had trialysis catheter placed in right IJ.     Mr. Newman currently complains of back pain and mild epigastric abdominal pain. He denies chest pain, shortness of breath, cough, or nausea at this time. He has been NPO for several days and now complains of thirst. He denies fevers. Patient lives at home with his mother and is unemployed. He denies any tobacco, alcohol, or drug use.     Per  Dr. Laurent's transfer note:      LABS:         T. brie 36.6  Plt 197  INR 1.2  Cr 7.0  Hgb 8.3 and reported as stable over past 3 days        Hospital Course:  Pt transferred for AES evaluation for gallstone pancreatitis.  Consulted AES and pt had ERCP/EUS.  Stent was placed.  Patient tolerated diet and doing well.  Hemoglobin improved following 1 unit PRBCs. Consulted Hematology to assist with sickle cell disease. Consulted Nephrology for help with renal failure.  Pt requesting to have dialysis at [facility] and want to be transferred back to prior hospital. Blood cultures show no growth. outpatient HD chair labs ordered     Interval History: Pt did well overnight. Tolerating diet well. No pain to abdominal palpation. Passing bowl movements well.     Review of Systems   Constitutional: Negative for chills, fatigue, fever and unexpected weight change.   HENT: Negative for congestion, hearing loss, rhinorrhea, sore throat and voice change.    Eyes: Negative for pain and redness.   Respiratory: Negative for apnea, cough and shortness of breath.    Cardiovascular: Negative for chest pain and palpitations.   Gastrointestinal: Positive for abdominal pain (improving s/p stent). Negative for constipation, diarrhea, nausea and vomiting.   Endocrine: Negative for polydipsia and polyuria.   Genitourinary: Positive for decreased urine volume.   Musculoskeletal: Negative for back pain and neck pain.   Skin: Negative for color change and rash.   Neurological: Negative for light-headedness and headaches.   Hematological: Negative for adenopathy. Does not bruise/bleed easily.   Psychiatric/Behavioral: Negative for confusion. The patient is not nervous/anxious.      Objective:     Vital Signs (Most Recent):  Temp: 96.4 °F (35.8 °C) (01/20/19 1311)  Pulse: 87 (01/20/19 1311)  Resp: 20 (01/20/19 1311)  BP: 130/76 (01/20/19 1311)  SpO2: (!) 93 % (01/20/19 1311) Vital Signs (24h Range):  Temp:  [96.4 °F (35.8  °C)-98.7 °F (37.1 °C)] 96.4 °F (35.8 °C)  Pulse:  [84-99] 87  Resp:  [16-20] 20  SpO2:  [90 %-95 %] 93 %  BP: (130-160)/(75-93) 130/76     Weight: 72.2 kg (159 lb 2 oz)  Body mass index is 25.68 kg/m².    Intake/Output Summary (Last 24 hours) at 1/20/2019 1328  Last data filed at 1/20/2019 0600  Gross per 24 hour   Intake 1610 ml   Output 1050 ml   Net 560 ml      Physical Exam   Constitutional: He is oriented to person, place, and time. He appears well-developed and well-nourished. No distress.   HENT:   Head: Normocephalic and atraumatic.   Mouth/Throat: Oropharynx is clear and moist.   Eyes: Conjunctivae and EOM are normal.   Neck: Normal range of motion. Neck supple.   Cardiovascular: Regular rhythm and normal heart sounds. Tachycardia present. Exam reveals no gallop and no friction rub.   No murmur heard.  Pulmonary/Chest: Effort normal and breath sounds normal. No stridor. No respiratory distress. He has no wheezes. He has no rales.   Abdominal: Soft. Bowel sounds are normal. He exhibits no distension. There is tenderness (RLQ about improved s/p stent). There is no guarding.   Musculoskeletal: He exhibits deformity. He exhibits no edema or tenderness.   Left upper extremity held in contracture   Neurological: He is alert and oriented to person, place, and time.   Skin: Skin is warm and dry. No rash noted. He is not diaphoretic.   Generalized swelling 2/2 to fluid on exam   Psychiatric: He has a normal mood and affect. His behavior is normal.       Significant Labs:   CBC:   Recent Labs   Lab 01/19/19  0414 01/20/19  0830   WBC 23.93* 24.15*   HGB 7.0* 6.9*   HCT 20.0* 19.4*    141*     CMP:   Recent Labs   Lab 01/19/19  0414 01/19/19  1350 01/20/19  0830   * 134* 136   K 4.2 4.1 4.4    100 101   CO2 24 24 25   GLU 90 116* 101   BUN 68* 72* 73*   CREATININE 6.2* 6.2* 6.3*   CALCIUM 9.2 9.4 9.7   PROT 5.7*  --  6.3   ALBUMIN 2.1*  2.1* 2.2* 2.3*  2.3*   BILITOT 21.2*  --  9.4*   ALKPHOS 250*  " --  217*   AST 58*  --  46*   ALT 70*  --  56*   ANIONGAP 10 10 10   EGFRNONAA 11.2* 11.2* 11.0*       Significant Imaging: I have reviewed and interpreted all pertinent imaging results/findings within the past 24 hours.    Assessment/Plan:      * Acute pancreatitis    Patient with gallstone pancreatitis admitted on 1/13 and treated with antibiotics (zosyn) and IV fluids. Currently only has minimal pain and no nausea. Transferred here for AES evaluation.     - pain control with oral agents,   - careful IV fluids given severe oliguria and dialysis requirement. Will continue to monitor fluid requirement  - Continued Zosyn   - blood cultures x2, lactic acid, lipase, CMP   - consulted AES. Pt is s/p biliary stent  - advance diet as tolerated. Tolerating diet well  - continued heparin  - pt requires repeat ERCP with AES in 10 weeks to remove stent  - 10 days of antbiobiots       Common bile duct stone    - s/p ERCP and stent placement with AES. Repeat ERCP in 10 weeks for stent removal        Acute renal failure superimposed on stage 4 chronic kidney disease    Baseline creatinine reportedly 2.5-2.6, up to 7.0 prior to transfer. Cr continues to be elevated compared to baseline. Will continue to monitor.    - pt has swelling on exam  - nephrology consulted  - Pt and family spoke with nehprology and plan is to go back to [facility] and continue dialysis and follow up with pts nephrologist.  - renal labs ordered  - will continue to monitor     Chronic gout of multiple sites    Home medication: allopurinol 100mg daily    - continue       Sickle cell disease without crisis    IRON OVERLOAD    Patient reports innumerable transfusions over the past year. Baseline hemoglobin ~9. Reports he was recently started on Epo injections three times weekly.     Home medications: L-glutamine, exjade. Does not take hydroxyurea "because it does not agree with him". Takes norco about 4 times per week as needed for pain.     - holding " exjade, contraindicated with renal failure  - L-glutamine not available in formulary, will have patient take his own medication  - retic count ordered  - Pt consented for blood.  transfused 1 unit pRBC  - hematology consulted. Appreciate recs.  - today Hgb 6.9. Will continue to monitor as pt is doing well with no signs of bleeding.               VTE Risk Mitigation (From admission, onward)        Ordered     heparin (porcine) injection 5,000 Units  Every 8 hours      01/18/19 1545     IP VTE LOW RISK PATIENT  Once      01/17/19 0006     Place sequential compression device  Until discontinued      01/17/19 0006              Alvaro Mtz MD  Department of Hospital Medicine   [facility]

## 2019-01-20 NOTE — ASSESSMENT & PLAN NOTE
Sickle cell disease appears to be stable at this time.  Retic count 5% but reticulocyte index 1.19 indicating hypoproliferation.  He is currently on endari and exjade for his sickle cell disease.  SCD appears to be stable at this time.  -Will check hemoglobin electrophoresis  -Suspect LAURENCE on CKD to be a complication of his episode of gallstone pancreatitis.  CKD is likely related to his SCD.  -No role for exchange transfusion at this time  -If discharged with close follow up with his hematologist, endari can be resumed at his outpatient appointment.  -Hold exjade for now.  Needs to be renally adjusted and since renal recovery is anticipated, would best best resumed outpatient as well.

## 2019-01-20 NOTE — ASSESSMENT & PLAN NOTE
Patient with gallstone pancreatitis admitted on 1/13 and treated with antibiotics (zosyn) and IV fluids. Currently only has minimal pain and no nausea. Transferred here for AES evaluation.     - pain control with oral agents,   - careful IV fluids given severe oliguria and dialysis requirement. Will continue to monitor fluid requirement  - Continued Zosyn   - blood cultures x2, lactic acid, lipase, CMP   - consulted AES. Pt is s/p biliary stent  - advance diet as tolerated. Tolerating diet well  - continued heparin  - pt requires repeat ERCP with AES in 10 weeks to remove stent  - 10 days of antbiobiots

## 2019-01-20 NOTE — SUBJECTIVE & OBJECTIVE
Oncology Treatment Plan:   [No treatment plan]    Medications:  Continuous Infusions:  Scheduled Meds:   allopurinol  100 mg Oral Daily    aspirin  81 mg Oral Daily    atorvastatin  40 mg Oral Daily    heparin (porcine)  5,000 Units Subcutaneous Q8H    piperacillin-tazobactam (ZOSYN) IVPB  4.5 g Intravenous Q12H    senna-docusate 8.6-50 mg  1 tablet Oral BID     PRN Meds:sodium chloride, dextrose 50%, dextrose 50%, glucagon (human recombinant), glucose, glucose, HYDROcodone-acetaminophen, HYDROcodone-acetaminophen, ondansetron, ramelteon, sodium chloride 0.9%     Review of patient's allergies indicates:   Allergen Reactions    Bactrim [sulfamethoxazole-trimethoprim] Hives        Past Medical History:   Diagnosis Date    Chronic gout of multiple sites 1/16/2019    CKD (chronic kidney disease), stage IV     CVA (cerebral vascular accident)     Gout     HTN (hypertension)     Iron overload     Iron overload due to repeated red blood cell transfusions 1/16/2019    Sickle cell anemia     Sickle cell disease without crisis 1/16/2019     Past Surgical History:   Procedure Laterality Date    CHOLECYSTECTOMY      ERCP (ENDOSCOPIC RETROGRADE CHOLANGIOPANCREATOGRAPHY) N/A 1/17/2019    Performed by Dickson Coto MD at [facility] ENDO (2ND FLR)    ruptured gastric ulcer with amy patch      TONSILLECTOMY      ULTRASOUND, ENDOSCOPIC, UPPER GI TRACT N/A 1/17/2019    Performed by Dickson Coto MD at [facility] (2ND FLR)     Family History     None        Tobacco Use    Smoking status: Never Smoker    Smokeless tobacco: Never Used   Substance and Sexual Activity    Alcohol use: No     Frequency: Never    Drug use: No    Sexual activity: Not on file       Review of Systems   Constitutional: Negative for chills, fatigue, fever and unexpected weight change.   HENT: Negative for congestion, hearing loss, rhinorrhea, sore throat and voice change.    Eyes: Negative for pain and redness.   Respiratory:  Negative for apnea, cough and shortness of breath.    Cardiovascular: Negative for chest pain and palpitations.   Gastrointestinal: Positive for abdominal pain (improving s/p stent). Negative for constipation, diarrhea, nausea and vomiting.   Endocrine: Negative for polydipsia and polyuria.   Genitourinary: Positive for decreased urine volume.   Musculoskeletal: Negative for back pain and neck pain.   Skin: Negative for color change and rash.   Neurological: Negative for light-headedness and headaches.   Hematological: Negative for adenopathy. Does not bruise/bleed easily.   Psychiatric/Behavioral: Negative for confusion. The patient is not nervous/anxious.      Objective:     Vital Signs (Most Recent):  Temp: 98 °F (36.7 °C) (01/19/19 2324)  Pulse: 87 (01/19/19 2324)  Resp: 18 (01/19/19 2324)  BP: (!) 160/75 (01/19/19 2324)  SpO2: 95 % (01/19/19 2324) Vital Signs (24h Range):  Temp:  [97.4 °F (36.3 °C)-98.7 °F (37.1 °C)] 98 °F (36.7 °C)  Pulse:  [] 87  Resp:  [18-20] 18  SpO2:  [90 %-99 %] 95 %  BP: (126-160)/(68-93) 160/75     Weight: 72.2 kg (159 lb 2 oz)  Body mass index is 25.68 kg/m².  Body surface area is 1.83 meters squared.      Intake/Output Summary (Last 24 hours) at 1/19/2019 2326  Last data filed at 1/19/2019 1715  Gross per 24 hour   Intake 800 ml   Output 500 ml   Net 300 ml       Physical Exam   Constitutional: He is oriented to person, place, and time. He appears well-developed and well-nourished. No distress.   HENT:   Head: Normocephalic and atraumatic.   Mouth/Throat: Oropharynx is clear and moist.   Eyes: Conjunctivae and EOM are normal.   Neck: Normal range of motion. Neck supple.   Cardiovascular: Regular rhythm and normal heart sounds. Tachycardia present. Exam reveals no gallop and no friction rub.   No murmur heard.  Pulmonary/Chest: Effort normal and breath sounds normal. No stridor. No respiratory distress. He has no wheezes. He has no rales.   Abdominal: Soft. Bowel sounds are  normal. He exhibits no distension. There is tenderness (RLQ about improved s/p stent). There is no guarding.   Musculoskeletal: He exhibits deformity. He exhibits no edema or tenderness.   Left upper extremity held in contracture   Neurological: He is alert and oriented to person, place, and time.   Skin: Skin is warm and dry. No rash noted. He is not diaphoretic.   Generalized swelling 2/2 to fluid on exam   Psychiatric: He has a normal mood and affect. His behavior is normal.       Significant Labs:   CBC:   Recent Labs   Lab 01/18/19 0455 01/19/19 0414   WBC 24.46* 23.93*   HGB 6.6* 7.0*   HCT 18.6* 20.0*    170    and CMP:   Recent Labs   Lab 01/18/19 0456 01/19/19 0414 01/19/19  1350   * 135* 134*   K 4.3 4.2 4.1    101 100   CO2 24 24 24    90 116*   BUN 63* 68* 72*   CREATININE 6.3* 6.2* 6.2*   CALCIUM 9.4 9.2 9.4   PROT 5.2* 5.7*  --    ALBUMIN 2.1*  2.1* 2.1*  2.1* 2.2*   BILITOT 44.0* 21.2*  --    ALKPHOS 341* 250*  --    AST 97* 58*  --    ALT 91* 70*  --    ANIONGAP 10 10 10   EGFRNONAA 11.0* 11.2* 11.2*       Diagnostic Results:  I have reviewed all pertinent imaging results/findings within the past 24 hours.

## 2019-01-20 NOTE — ASSESSMENT & PLAN NOTE
Baseline creatinine reportedly 2.5-2.6, up to 7.0 prior to transfer. Cr continues to be elevated compared to baseline. Will continue to monitor.    - pt has swelling on exam  - nephrology consulted  - Pt and family spoke with nehprology and plan is to go back to [facility] and continue dialysis and follow up with pts nephrologist.  - renal labs ordered  - will continue to monitor

## 2019-01-20 NOTE — CONSULTS
[facility]  Hematology/Oncology  Consult Note    Patient Name: Ambrosio Newman  MRN: [id number]  Admission Date: 1/16/2019  Hospital Length of Stay: 3 days  Code Status: Full Code   Attending Provider: Callie Grace MD  Consulting Provider: Gil Lane MD  Primary Care Physician: Cuauhtemoc Posada MD  Principal Problem:Acute pancreatitis    Inpatient consult to Hematology  Consult performed by: Gil Lane MD  Consult ordered by: Alvaro Mtz MD        Subjective:     HPI:  Mr. 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      Mr. Newman was admitted to [facility] on 1/13 with complaint of epigastric abdominal pain, nausea, vomiting, and chills. He was found to have lipase of 1123, and finding of cholelithiasis as well as stone in distal common duct on US of abdomen. Patient reports that he had laparoscopic cholecystectomy at another hospital last year. Mr. Newman was treated with IV Zosyn while at [facility] and had CT abdomen, which showed gallstones in gallbladder, surgically absent spleen, and normal appearance of pancreas. MRCP was performed on 1/14 with finding of dilated common bile duct (8mm), diffuse intrahepatic and extrahepatic biliary dilatation, and small common duct stone at distal common duct.      During his hospitalization, Mr. Newman was started on dialysis for the first time on 1/16 due to oliguria and rising creatinine (up to 7.0). He had trialysis catheter placed in right IJ.     Heme onc consulted to provide recommendations for optimizing sickle cell, particularly if it is contributing to his renal failure.    Mother is at bedside and says he is followed by Dr. Mercy Posada in [place].  She says his baseline hemoglobin  is around 9 and creatinine is 2.5-3.  She also says he gets procrit outpatient.    Oncology Treatment Plan:   [No treatment plan]    Medications:  Continuous Infusions:  Scheduled Meds:   allopurinol  100 mg Oral Daily    aspirin  81 mg Oral Daily    atorvastatin  40 mg Oral Daily    heparin (porcine)  5,000 Units Subcutaneous Q8H    piperacillin-tazobactam (ZOSYN) IVPB  4.5 g Intravenous Q12H    senna-docusate 8.6-50 mg  1 tablet Oral BID     PRN Meds:sodium chloride, dextrose 50%, dextrose 50%, glucagon (human recombinant), glucose, glucose, HYDROcodone-acetaminophen, HYDROcodone-acetaminophen, ondansetron, ramelteon, sodium chloride 0.9%     Review of patient's allergies indicates:   Allergen Reactions    Bactrim [sulfamethoxazole-trimethoprim] Hives        Past Medical History:   Diagnosis Date    Chronic gout of multiple sites 1/16/2019    CKD (chronic kidney disease), stage IV     CVA (cerebral vascular accident)     Gout     HTN (hypertension)     Iron overload     Iron overload due to repeated red blood cell transfusions 1/16/2019    Sickle cell anemia     Sickle cell disease without crisis 1/16/2019     Past Surgical History:   Procedure Laterality Date    CHOLECYSTECTOMY      ERCP (ENDOSCOPIC RETROGRADE CHOLANGIOPANCREATOGRAPHY) N/A 1/17/2019    Performed by Dickson Coto MD at [facility] ENDO (2ND FLR)    ruptured gastric ulcer with amy patch      TONSILLECTOMY      ULTRASOUND, ENDOSCOPIC, UPPER GI TRACT N/A 1/17/2019    Performed by Dickson Coto MD at [facility] ENDO (2ND FLR)     Family History     None        Tobacco Use    Smoking status: Never Smoker    Smokeless tobacco: Never Used   Substance and Sexual Activity    Alcohol use: No     Frequency: Never    Drug use: No    Sexual activity: Not on file       Review of Systems   Constitutional: Negative for chills, fatigue, fever and unexpected weight change.   HENT: Negative for congestion, hearing loss, rhinorrhea, sore  throat and voice change.    Eyes: Negative for pain and redness.   Respiratory: Negative for apnea, cough and shortness of breath.    Cardiovascular: Negative for chest pain and palpitations.   Gastrointestinal: Positive for abdominal pain (improving s/p stent). Negative for constipation, diarrhea, nausea and vomiting.   Endocrine: Negative for polydipsia and polyuria.   Genitourinary: Positive for decreased urine volume.   Musculoskeletal: Negative for back pain and neck pain.   Skin: Negative for color change and rash.   Neurological: Negative for light-headedness and headaches.   Hematological: Negative for adenopathy. Does not bruise/bleed easily.   Psychiatric/Behavioral: Negative for confusion. The patient is not nervous/anxious.      Objective:     Vital Signs (Most Recent):  Temp: 98 °F (36.7 °C) (01/19/19 2324)  Pulse: 87 (01/19/19 2324)  Resp: 18 (01/19/19 2324)  BP: (!) 160/75 (01/19/19 2324)  SpO2: 95 % (01/19/19 2324) Vital Signs (24h Range):  Temp:  [97.4 °F (36.3 °C)-98.7 °F (37.1 °C)] 98 °F (36.7 °C)  Pulse:  [] 87  Resp:  [18-20] 18  SpO2:  [90 %-99 %] 95 %  BP: (126-160)/(68-93) 160/75     Weight: 72.2 kg (159 lb 2 oz)  Body mass index is 25.68 kg/m².  Body surface area is 1.83 meters squared.      Intake/Output Summary (Last 24 hours) at 1/19/2019 2326  Last data filed at 1/19/2019 1715  Gross per 24 hour   Intake 800 ml   Output 500 ml   Net 300 ml       Physical Exam   Constitutional: He is oriented to person, place, and time. He appears well-developed and well-nourished. No distress.   HENT:   Head: Normocephalic and atraumatic.   Mouth/Throat: Oropharynx is clear and moist.   Eyes: Conjunctivae and EOM are normal.   Neck: Normal range of motion. Neck supple.   Cardiovascular: Regular rhythm and normal heart sounds. Tachycardia present. Exam reveals no gallop and no friction rub.   No murmur heard.  Pulmonary/Chest: Effort normal and breath sounds normal. No stridor. No respiratory  distress. He has no wheezes. He has no rales.   Abdominal: Soft. Bowel sounds are normal. He exhibits no distension. There is tenderness (RLQ about improved s/p stent). There is no guarding.   Musculoskeletal: He exhibits deformity. He exhibits no edema or tenderness.   Left upper extremity held in contracture   Neurological: He is alert and oriented to person, place, and time.   Skin: Skin is warm and dry. No rash noted. He is not diaphoretic.   Generalized swelling 2/2 to fluid on exam   Psychiatric: He has a normal mood and affect. His behavior is normal.       Significant Labs:   CBC:   Recent Labs   Lab 01/18/19 0455 01/19/19  0414   WBC 24.46* 23.93*   HGB 6.6* 7.0*   HCT 18.6* 20.0*    170    and CMP:   Recent Labs   Lab 01/18/19 0456 01/19/19  0414 01/19/19  1350   * 135* 134*   K 4.3 4.2 4.1    101 100   CO2 24 24 24    90 116*   BUN 63* 68* 72*   CREATININE 6.3* 6.2* 6.2*   CALCIUM 9.4 9.2 9.4   PROT 5.2* 5.7*  --    ALBUMIN 2.1*  2.1* 2.1*  2.1* 2.2*   BILITOT 44.0* 21.2*  --    ALKPHOS 341* 250*  --    AST 97* 58*  --    ALT 91* 70*  --    ANIONGAP 10 10 10   EGFRNONAA 11.0* 11.2* 11.2*       Diagnostic Results:  I have reviewed all pertinent imaging results/findings within the past 24 hours.    Assessment/Plan:     Sickle cell disease without crisis    Sickle cell disease appears to be stable at this time.  Retic count 5% but reticulocyte index 1.19 indicating hypoproliferation.  He is currently on endari and exjade for his sickle cell disease.  SCD appears to be stable at this time.  -Will check hemoglobin electrophoresis  -Suspect LAURENCE on CKD to be a complication of his episode of gallstone pancreatitis.  CKD is likely related to his SCD.  -No role for exchange transfusion at this time  -If discharged with close follow up with his hematologist, endari can be resumed at his outpatient appointment.  -Hold exjade for now.  Needs to be renally adjusted and since renal recovery  is anticipated, would best best resumed outpatient as well.         Thank you for your consult. I will sign off. Please contact us if you have any additional questions.    Gil Lane MD  Hematology/Oncology  [facility]

## 2019-01-20 NOTE — HPI
Mr. 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      Mr. Newman was admitted to [facility] on 1/13 with complaint of epigastric abdominal pain, nausea, vomiting, and chills. He was found to have lipase of 1123, and finding of cholelithiasis as well as stone in distal common duct on US of abdomen. Patient reports that he had laparoscopic cholecystectomy at another hospital last year. Mr. Newman was treated with IV Zosyn while at [facility] and had CT abdomen, which showed gallstones in gallbladder, surgically absent spleen, and normal appearance of pancreas. MRCP was performed on 1/14 with finding of dilated common bile duct (8mm), diffuse intrahepatic and extrahepatic biliary dilatation, and small common duct stone at distal common duct.      During his hospitalization, Mr. Newman was started on dialysis for the first time on 1/16 due to oliguria and rising creatinine (up to 7.0). He had trialysis catheter placed in right IJ.     Heme onc consulted to provide recommendations for optimizing sickle cell, particularly if it is contributing to his renal failure.    Mother is at bedside and says he is followed by Dr. Mercy Posada in [place].  She says his baseline hemoglobin is around 9 and creatinine is 2.5-3.  She also says he gets procrit outpatient.

## 2019-01-20 NOTE — ASSESSMENT & PLAN NOTE
"IRON OVERLOAD    Patient reports innumerable transfusions over the past year. Baseline hemoglobin ~9. Reports he was recently started on Epo injections three times weekly.     Home medications: L-glutamine, exjade. Does not take hydroxyurea "because it does not agree with him". Takes norco about 4 times per week as needed for pain.     - holding exjade, contraindicated with renal failure  - L-glutamine not available in formulary, will have patient take his own medication  - retic count ordered  - Pt consented for blood.  transfused 1 unit pRBC  - hematology consulted. Appreciate recs.  - today Hgb 6.9. Will continue to monitor as pt is doing well with no signs of bleeding.          "

## 2019-01-21 VITALS
DIASTOLIC BLOOD PRESSURE: 90 MMHG | TEMPERATURE: 99 F | HEART RATE: 78 BPM | HEIGHT: 66 IN | OXYGEN SATURATION: 94 % | RESPIRATION RATE: 20 BRPM | WEIGHT: 159.13 LBS | BODY MASS INDEX: 25.57 KG/M2 | SYSTOLIC BLOOD PRESSURE: 150 MMHG

## 2019-01-21 PROBLEM — K85.90 ACUTE PANCREATITIS: Status: RESOLVED | Noted: 2019-01-16 | Resolved: 2019-01-21

## 2019-01-21 LAB
ALBUMIN SERPL BCP-MCNC: 2.4 G/DL
ALP SERPL-CCNC: 198 U/L
ALT SERPL W/O P-5'-P-CCNC: 45 U/L
ANION GAP SERPL CALC-SCNC: 10 MMOL/L
ANION GAP SERPL CALC-SCNC: 10 MMOL/L
ANION GAP SERPL CALC-SCNC: 9 MMOL/L
ANISOCYTOSIS BLD QL SMEAR: ABNORMAL
AST SERPL-CCNC: 36 U/L
BASOPHILS # BLD AUTO: 0.17 K/UL
BASOPHILS NFR BLD: 0.7 %
BILIRUB DIRECT SERPL-MCNC: 5.8 MG/DL
BILIRUB SERPL-MCNC: 8 MG/DL
BLD PROD TYP BPU: NORMAL
BLOOD UNIT EXPIRATION DATE: NORMAL
BLOOD UNIT TYPE CODE: 7300
BLOOD UNIT TYPE: NORMAL
BUN SERPL-MCNC: 74 MG/DL
CALCIUM SERPL-MCNC: 9.7 MG/DL
CHLORIDE SERPL-SCNC: 103 MMOL/L
CO2 SERPL-SCNC: 23 MMOL/L
CODING SYSTEM: NORMAL
CREAT SERPL-MCNC: 5.8 MG/DL
DIFFERENTIAL METHOD: ABNORMAL
DISPENSE STATUS: NORMAL
EOSINOPHIL # BLD AUTO: 2.2 K/UL
EOSINOPHIL NFR BLD: 9.5 %
ERYTHROCYTE [DISTWIDTH] IN BLOOD BY AUTOMATED COUNT: 19.3 %
EST. GFR  (AFRICAN AMERICAN): 14.1 ML/MIN/1.73 M^2
EST. GFR  (NON AFRICAN AMERICAN): 12.2 ML/MIN/1.73 M^2
GLUCOSE SERPL-MCNC: 120 MG/DL
GLUCOSE SERPL-MCNC: 120 MG/DL
GLUCOSE SERPL-MCNC: 94 MG/DL
HAV IGM SERPL QL IA: NEGATIVE
HBV CORE AB SERPL QL IA: NEGATIVE
HBV SURFACE AB SER-ACNC: POSITIVE M[IU]/ML
HBV SURFACE AG SERPL QL IA: NEGATIVE
HCT VFR BLD AUTO: 19.6 %
HGB BLD-MCNC: 6.8 G/DL
HYPOCHROMIA BLD QL SMEAR: ABNORMAL
IMM GRANULOCYTES # BLD AUTO: 0.29 K/UL
IMM GRANULOCYTES NFR BLD AUTO: 1.2 %
LYMPHOCYTES # BLD AUTO: 6.7 K/UL
LYMPHOCYTES NFR BLD: 28.4 %
MAGNESIUM SERPL-MCNC: 1.6 MG/DL
MCH RBC QN AUTO: 28.1 PG
MCHC RBC AUTO-ENTMCNC: 34.7 G/DL
MCV RBC AUTO: 81 FL
MONOCYTES # BLD AUTO: 2.5 K/UL
MONOCYTES NFR BLD: 10.7 %
NEUTROPHILS # BLD AUTO: 11.6 K/UL
NEUTROPHILS NFR BLD: 49.5 %
NRBC BLD-RTO: 1 /100 WBC
PHOSPHATE SERPL-MCNC: 4.2 MG/DL
PHOSPHATE SERPL-MCNC: 4.4 MG/DL
PHOSPHATE SERPL-MCNC: 4.4 MG/DL
PLATELET # BLD AUTO: 180 K/UL
PLATELET BLD QL SMEAR: ABNORMAL
PMV BLD AUTO: 11.8 FL
POIKILOCYTOSIS BLD QL SMEAR: SLIGHT
POLYCHROMASIA BLD QL SMEAR: ABNORMAL
POTASSIUM SERPL-SCNC: 4.1 MMOL/L
POTASSIUM SERPL-SCNC: 4.1 MMOL/L
POTASSIUM SERPL-SCNC: 4.3 MMOL/L
PROCALCITONIN SERPL IA-MCNC: 1.78 NG/ML
PROT SERPL-MCNC: 6.5 G/DL
RBC # BLD AUTO: 2.42 M/UL
SICKLE CELLS BLD QL SMEAR: ABNORMAL
SODIUM SERPL-SCNC: 135 MMOL/L
SODIUM SERPL-SCNC: 136 MMOL/L
SODIUM SERPL-SCNC: 136 MMOL/L
TARGETS BLD QL SMEAR: ABNORMAL
TRANS ERYTHROCYTES VOL PATIENT: NORMAL ML
WBC # BLD AUTO: 23.45 K/UL

## 2019-01-21 PROCEDURE — 36430 TRANSFUSION BLD/BLD COMPNT: CPT

## 2019-01-21 PROCEDURE — 25000003 PHARM REV CODE 250: Performed by: STUDENT IN AN ORGANIZED HEALTH CARE EDUCATION/TRAINING PROGRAM

## 2019-01-21 PROCEDURE — 85025 COMPLETE CBC W/AUTO DIFF WBC: CPT

## 2019-01-21 PROCEDURE — 87040 BLOOD CULTURE FOR BACTERIA: CPT

## 2019-01-21 PROCEDURE — 80069 RENAL FUNCTION PANEL: CPT | Mod: 91

## 2019-01-21 PROCEDURE — 36415 COLL VENOUS BLD VENIPUNCTURE: CPT

## 2019-01-21 PROCEDURE — 94761 N-INVAS EAR/PLS OXIMETRY MLT: CPT

## 2019-01-21 PROCEDURE — 99239 HOSP IP/OBS DSCHRG MGMT >30: CPT | Mod: GC,,, | Performed by: INTERNAL MEDICINE

## 2019-01-21 PROCEDURE — P9021 RED BLOOD CELLS UNIT: HCPCS

## 2019-01-21 PROCEDURE — 84075 ASSAY ALKALINE PHOSPHATASE: CPT

## 2019-01-21 PROCEDURE — 63600175 PHARM REV CODE 636 W HCPCS: Performed by: STUDENT IN AN ORGANIZED HEALTH CARE EDUCATION/TRAINING PROGRAM

## 2019-01-21 PROCEDURE — 94799 UNLISTED PULMONARY SVC/PX: CPT

## 2019-01-21 PROCEDURE — 99239 PR HOSPITAL DISCHARGE DAY,>30 MIN: ICD-10-PCS | Mod: GC,,, | Performed by: INTERNAL MEDICINE

## 2019-01-21 PROCEDURE — 80069 RENAL FUNCTION PANEL: CPT

## 2019-01-21 PROCEDURE — 83735 ASSAY OF MAGNESIUM: CPT

## 2019-01-21 PROCEDURE — 82247 BILIRUBIN TOTAL: CPT

## 2019-01-21 RX ORDER — ALLOPURINOL 100 MG/1
100 TABLET ORAL DAILY
Start: 2019-01-21 | End: 2019-06-27 | Stop reason: ALTCHOICE

## 2019-01-21 RX ORDER — NAPROXEN SODIUM 220 MG/1
81 TABLET, FILM COATED ORAL DAILY
Refills: 0
Start: 2019-01-21 | End: 2019-06-27 | Stop reason: ALTCHOICE

## 2019-01-21 RX ORDER — MAGNESIUM SULFATE HEPTAHYDRATE 40 MG/ML
2 INJECTION, SOLUTION INTRAVENOUS ONCE
Status: COMPLETED | OUTPATIENT
Start: 2019-01-21 | End: 2019-01-21

## 2019-01-21 RX ORDER — HYDROCODONE BITARTRATE AND ACETAMINOPHEN 500; 5 MG/1; MG/1
TABLET ORAL
Status: DISCONTINUED | OUTPATIENT
Start: 2019-01-21 | End: 2019-01-22 | Stop reason: HOSPADM

## 2019-01-21 RX ORDER — ATORVASTATIN CALCIUM 40 MG/1
40 TABLET, FILM COATED ORAL DAILY
Qty: 90 TABLET | Refills: 3
Start: 2019-01-21 | End: 2019-06-27 | Stop reason: ALTCHOICE

## 2019-01-21 RX ADMIN — ASPIRIN 81 MG CHEWABLE TABLET 81 MG: 81 TABLET CHEWABLE at 09:01

## 2019-01-21 RX ADMIN — HYDROCODONE BITARTRATE AND ACETAMINOPHEN 1 TABLET: 10; 325 TABLET ORAL at 04:01

## 2019-01-21 RX ADMIN — PIPERACILLIN AND TAZOBACTAM 4.5 G: 4; .5 INJECTION, POWDER, LYOPHILIZED, FOR SOLUTION INTRAVENOUS; PARENTERAL at 06:01

## 2019-01-21 RX ADMIN — HYDROCODONE BITARTRATE AND ACETAMINOPHEN 1 TABLET: 5; 325 TABLET ORAL at 06:01

## 2019-01-21 RX ADMIN — PIPERACILLIN AND TAZOBACTAM 4.5 G: 4; .5 INJECTION, POWDER, LYOPHILIZED, FOR SOLUTION INTRAVENOUS; PARENTERAL at 04:01

## 2019-01-21 RX ADMIN — HEPARIN SODIUM 5000 UNITS: 5000 INJECTION, SOLUTION INTRAVENOUS; SUBCUTANEOUS at 01:01

## 2019-01-21 RX ADMIN — HEPARIN SODIUM 5000 UNITS: 5000 INJECTION, SOLUTION INTRAVENOUS; SUBCUTANEOUS at 05:01

## 2019-01-21 RX ADMIN — HYDROCODONE BITARTRATE AND ACETAMINOPHEN 1 TABLET: 10; 325 TABLET ORAL at 10:01

## 2019-01-21 RX ADMIN — STANDARDIZED SENNA CONCENTRATE AND DOCUSATE SODIUM 1 TABLET: 8.6; 5 TABLET, FILM COATED ORAL at 09:01

## 2019-01-21 RX ADMIN — MAGNESIUM SULFATE IN WATER 2 G: 40 INJECTION, SOLUTION INTRAVENOUS at 05:01

## 2019-01-21 NOTE — PLAN OF CARE
[facility]    FACILITY TRANSFER ORDERS      Patient Name: Ambrosio Newman  YOB: 1990    PCP: Cuauhtemoc Posada MD   PCP Address: [address] [facility] / *  PCP Phone Number: [phone]  PCP Fax: [phone]    Encounter Date: 01/21/2019    Admit to: [facility]    Vital Signs:  Routine    Diagnoses:   Active Hospital Problems    Diagnosis  POA    *Acute pancreatitis [K85.90]  Yes    Sickle cell disease without crisis [D57.1]  Yes    Iron overload due to repeated red blood cell transfusions [E83.111]  Yes    Chronic gout of multiple sites [M1A.09X0]  Yes    Acute renal failure superimposed on stage 4 chronic kidney disease [N17.9, N18.4]  Yes    Common bile duct stone [K80.50]  Yes      Resolved Hospital Problems   No resolved problems to display.       Allergies:  Review of patient's allergies indicates:   Allergen Reactions    Bactrim [sulfamethoxazole-trimethoprim] Hives       Diet: renal diet    Activities: Activity as tolerated    Nursing: Per facility protocol      Labs: Per accepting physician assessment    CONSULTS:    Physical Therapy to evaluate and treat. , Occupational Therapy to evaluate and treat. and  to evaluate for community resources/long-range planning.      Medications: Review discharge medications with patient and family and provide education.      Current Discharge Medication List      START taking these medications    Details   allopurinol (ZYLOPRIM) 100 MG tablet Take 1 tablet (100 mg total) by mouth once daily.      aspirin 81 MG Chew Take 1 tablet (81 mg total) by mouth once daily.  Refills: 0             piperacillin sodium/tazobactam (PIPERACILLIN-TAZOBACTAM 4.5G/100ML SODIUM CHLORIDE 0.9%-READY TO MIX) Inject 100 mLs (4.5 g total) into the vein every 12 (twelve) hours.         STOP taking these medications       calcitRIOL (ROCALTROL) 0.25 MCG Cap Comments:   Reason for Stopping:          carvedilol (COREG) 12.5 MG tablet Comments:   Reason for Stopping:         colchicine (COLCRYS) 0.6 mg tablet Comments:   Reason for Stopping:         deferasirox (EXJADE) 125 MG disintegrating tablet Comments:   Reason for Stopping:         diphenhydrAMINE (BENADRYL) 25 mg capsule Comments:   Reason for Stopping:         febuxostat (ULORIC) 40 mg Tab Comments:   Reason for Stopping:         fluticasone (FLONASE) 50 mcg/actuation nasal spray Comments:   Reason for Stopping:         folic acid (FOLVITE) 1 MG tablet Comments:   Reason for Stopping:         furosemide (LASIX) 40 MG tablet Comments:   Reason for Stopping:         GLUTAMINE ORAL Comments:   Reason for Stopping:         ibuprofen (ADVIL,MOTRIN) 200 MG tablet Comments:   Reason for Stopping:         patiromer calcium sorbitex (VELTASSA) 8.4 gram PwPk Comments:   Reason for Stopping:                    _________________________________  Shaan Hightower MD  01/21/2019

## 2019-01-21 NOTE — PT/OT/SLP PROGRESS
Physical Therapy      Patient Name:  Ambrosio Newman   MRN:  [id number]    Patient not seen today secondary to preparing to d/c to another facility  .     Mendez Ricks, PT

## 2019-01-21 NOTE — PROGRESS NOTES
[facility]  Nephrology  Progress Note    Patient Name: Ambrosio Newman  MRN: [id number]  Admission Date: 1/16/2019  Hospital Length of Stay: 5 days  Attending Provider: Callie Grace MD   Primary Care Physician: Cuauhtemoc Posada MD  Principal Problem:Acute pancreatitis    Subjective:     HPI: Ambrosio Newman is a 28 year old M who was transferred overnight from OSH for gallstone pancreatitis AES evaluation.  MHx includes sickle cell disease, CVA, HTN, gout, and CKD4.  Nephrology was consulted because patient received first time dialysis on 1/16 due to oliguria and increasing Cr, up to 7.0.  His baseline Cr is reportedly ~2.5 and was 4.7 at time of admission.  He remains oliguric w/ 100mL recorded output since arriving, however no AMS or resp distress.  RIJ trialysis is in place.  He has no signs/symptoms of uremia.  He does not appear or sound fluid overloaded on exam, however CXR consistent w/ congestive failure likely due to volume overload.    Interval History: Pt did not received dialysis this weekend, pt refused new line placement and family's wishes were to be transferred back to [facility].  Will likely be transferred today.    Review of patient's allergies indicates:   Allergen Reactions    Bactrim [sulfamethoxazole-trimethoprim] Hives     Current Facility-Administered Medications   Medication Frequency    0.9%  NaCl infusion (for blood administration) Q24H PRN    allopurinol tablet 100 mg Daily    aspirin chewable tablet 81 mg Daily    dextrose 50% injection 12.5 g PRN    dextrose 50% injection 25 g PRN    glucagon (human recombinant) injection 1 mg PRN    glucose chewable tablet 16 g PRN    glucose chewable tablet 24 g PRN    heparin (porcine) injection 5,000 Units Q8H    HYDROcodone-acetaminophen  mg per tablet 1 tablet Q6H PRN    HYDROcodone-acetaminophen 5-325 mg per tablet 1 tablet Q6H PRN    ondansetron disintegrating tablet 8 mg Q8H PRN     piperacillin-tazobactam 4.5 g in sodium chloride 0.9% 100 mL IVPB (ready to mix system) Q12H    ramelteon tablet 8 mg Nightly PRN    senna-docusate 8.6-50 mg per tablet 1 tablet BID    sodium chloride 0.9% flush 5 mL PRN       Objective:     Vital Signs (Most Recent):  Temp: 96.6 °F (35.9 °C) (01/21/19 0759)  Pulse: 75 (01/21/19 0759)  Resp: 18 (01/21/19 0759)  BP: (!) 134/92 (01/21/19 0759)  SpO2: (!) 94 % (01/21/19 0759)  O2 Device (Oxygen Therapy): room air (01/21/19 0637) Vital Signs (24h Range):  Temp:  [96.4 °F (35.8 °C)-97.8 °F (36.6 °C)] 96.6 °F (35.9 °C)  Pulse:  [75-90] 75  Resp:  [16-20] 18  SpO2:  [90 %-94 %] 94 %  BP: (130-156)/(76-92) 134/92     Weight: 72.2 kg (159 lb 2 oz) (01/17/19 0348)  Body mass index is 25.68 kg/m².  Body surface area is 1.83 meters squared.    I/O last 3 completed shifts:  In: 2028 [P.O.:1810; Blood:218]  Out: 1275 [Urine:1275]    Physical Exam    Significant Labs:  BMP:   Recent Labs   Lab 01/21/19 0447   GLU 94      CO2 23   BUN 74*   CREATININE 5.8*   CALCIUM 9.7   MG 1.6     CBC:   Recent Labs   Lab 01/21/19 0447   WBC 23.45*   RBC 2.42*   HGB 6.8*   HCT 19.6*      MCV 81*   MCH 28.1   MCHC 34.7     CMP:   Recent Labs   Lab 01/21/19 0447   GLU 94   CALCIUM 9.7   ALBUMIN 2.4*  2.4*   PROT 6.5   *   K 4.3   CO2 23      BUN 74*   CREATININE 5.8*   ALKPHOS 198*   ALT 45*   AST 36   BILITOT 8.0*     LFTs:   Recent Labs   Lab 01/21/19  0447   ALT 45*   AST 36   ALKPHOS 198*   BILITOT 8.0*   PROT 6.5   ALBUMIN 2.4*  2.4*     All labs within the past 24 hours have been reviewed.     Significant Imaging:  NA    Assessment/Plan:     Acute renal failure superimposed on stage 4 chronic kidney disease    28 M who was transferred overnight from OSH for gallstone pancreatitis AES evaluation.  CKD4 per chart, 1st time dialysis on 1/16 for Cr 7 and oliguria.  Baseline Cr reportedly ~2.5 per transfer note.  Cr 4.7 at time of admission pending AM labs.  He  remains oliguric w/ 100mL recorded output since arriving, however no AMS or resp distress.  RIJ trialysis is in place.  He has no signs/symptoms of uremia.  He does not appear or sound fluid overloaded on exam, however CXR consistent w/ congestive failure likely due to volume overload.    Plan  -Oliguria improved.  UO 725mL yesterday without diuretics  -Kidney function still decreased, has been consistently ~6  -Plan was to dialyze this weekend however pt/family refused placement of new central line and wished to be transferred back to original hospital, likely transfer today         Thank you for your consult. I will follow-up with patient. Please contact us if you have any additional questions.    Gil Sparks MD  Nephrology  [facility]

## 2019-01-21 NOTE — ASSESSMENT & PLAN NOTE
Baseline creatinine reportedly 2.5-2.6, up to 7.0 prior to transfer. Cr continues to be elevated compared to baseline but slightly improved today, had some clearance    - pt has swelling on exam  - nephrology consulted  - Pt and family spoke with nerology and plan is to go back to [facility] and continue dialysis and follow up with pts nephrologist.

## 2019-01-21 NOTE — PLAN OF CARE
Call placed to [facility] ([id number]) to check on status of patient's transfer to [facility]. Informed by Pat that a message had been left for the CM at [place] and she was awaiting a return phone call. IM4 team updated, will continue to follow.    Hannah Doran RN  Ext [phone]

## 2019-01-21 NOTE — ASSESSMENT & PLAN NOTE
28 M who was transferred overnight from OSH for gallstone pancreatitis AES evaluation.  CKD4 per chart, 1st time dialysis on 1/16 for Cr 7 and oliguria.  Baseline Cr reportedly ~2.5 per transfer note.  Cr 4.7 at time of admission pending AM labs.  He remains oliguric w/ 100mL recorded output since arriving, however no AMS or resp distress.  RIJ trialysis is in place.  He has no signs/symptoms of uremia.  He does not appear or sound fluid overloaded on exam, however CXR consistent w/ congestive failure likely due to volume overload.    Plan  -In setting of pulmonary edema and prior oliguria, considering lasix challenge, however do not want to give lasix if concern for acute chest syndrome  -Oliguria improved.  UO 725mL yesterday without diuretics  -Kidney function still decreased, has been consistently ~6  -Plan was to dialyze this weekend however pt/family refused placement of new central line and wished to be transferred back to original hospital, likely transfer today

## 2019-01-21 NOTE — PLAN OF CARE
Call received from Pat in the [facility] informing this CM that Mr. Newman has been accepted to [facility] and will let CM/SW know when a room has been assigned. Will continue to follow.    Hannah Doran RN  Ext [phone]

## 2019-01-21 NOTE — DISCHARGE SUMMARY
[facility] Hospital Medicine  Discharge Summary      Patient Name: Ambrosio Newman  MRN: [id number]  Admission Date: 1/16/2019  Hospital Length of Stay: 5 days  Discharge Date and Time:  01/21/2019 8:54 AM  Attending Physician: Callie Grace MD   Discharging Provider: Shaan Hightower MD  Primary Care Provider: Cuauhtemoc Posada MD  Hospital Medicine Team: [facility] HOSP MED 4 Shaan Hightower MD    HPI:   Mr. 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     Mr. Newman was admitted to [facility] on 1/13 with complaint of epigastric abdominal pain, nausea, vomiting, and chills. He was found to have lipase of 1123, and finding of cholelithiasis as well as stone in distal common duct on US of abdomen. Patient reports that he had laparoscopic cholecystectomy at another hospital last year. Mr. Newman was treated with IV Zosyn while at [facility] and had CT abdomen, which showed gallstones in gallbladder, surgically absent spleen, and normal appearance of pancreas. MRCP was performed on 1/14 with finding of dilated common bile duct (8mm), diffuse intrahepatic and extrahepatic biliary dilatation, and small common duct stone at distal common duct.     During his hospitalization, Mr. Newman was started on dialysis for the first time on 1/16 due to oliguria and rising creatinine (up to 7.0). He had trialysis catheter placed in right IJ.     Mr. Newman currently complains of back pain and mild epigastric abdominal pain. He denies chest pain, shortness of breath, cough, or nausea at this time. He has been NPO for several days and now complains of thirst. He denies fevers. Patient lives at home with his mother and is unemployed. He denies any tobacco, alcohol, or  drug use.     Per Dr. Laurent's transfer note:      LABS:         T. brie 36.6  Plt 197  INR 1.2  Cr 7.0  Hgb 8.3 and reported as stable over past 3 days        Procedure(s) (LRB):  ULTRASOUND, ENDOSCOPIC, UPPER GI TRACT (N/A)  ERCP (ENDOSCOPIC RETROGRADE CHOLANGIOPANCREATOGRAPHY) (N/A)      Hospital Course:   Pt transferred for advanced endoscopy evaluation for gallstone pancreatitis.  Consulted AES and pt had ERCP/EUS.  Stent was placed.  Bilirubin and transaminases steadily downtrending post procedure. Abdominal pain, N/V markedly improved. Has tolerated full diet since. GI recommendations:    - Repeat ERCP in 10 weeks to remove stent.    - Continue antibiotics for 7-10 days. (1/21 is day 5 of piperacillin/tazobactam)    Hemoglobin improved following 1 unit PRBCs. Consulted Hematology to assist with sickle cell disease, recommended against exchange transfusion. Endari and Exjade also held at their recommendation, until O/p f/u with his hematologist. Hgb was stable around 7 but dipped to 6.9 and 6.8 on 1/19 and 1/20 so he was transfused 1 additional unit PRBCs. No signs of blood loss in stool. Iron studies not performed as patient was s/p transfusion. Suspected 2/2 renal disease, sickle cell.     Nephrology consulted for renal failure.  No acute dialysis indications however nephrology though patient would benefit from clearance. Requested new line placement, refused by patient and family. Wanted to have further interventions performed in [place]. Outpatient HD chair labs ordered, pending.        EUS:   Findings:       ENDOSONOGRAPHIC FINDING: :       The esophagus, stomach and duodenum and adjacent structures were        visualized endosonographically.       The Aorta was identified and is followed to the Celiac and        Mesenteric Artery takeoff. They appeared normal without evidence of        lymphadenopathy in the region. The Celiac Artery is then followed        until the pancreas is  identified. The pancreatic body and tail are        surveyed from this region.       Pancreatic parenchymal abnormalities were noted in the entire        pancreas. These consisted of hyperechoic strands, hypoechoic foci        and lobularity. The pancreatic duct measured 1.3 mm in diameter. The        pancreatic duct was normal in its course without areas of dilation,        stricture or irregularity. No abnormal side branches were identified.       The pancreas was then surveyed from the duodenal stations.        Pancreatic parenchymal abnormalities were noted in the entire        pancreas. These consisted of hyperechoic strands, hypoechoic foci        and lobularity. The pancreatic duct measured 1.7 mm in this region        and appeared normal in its course. The common bile duct was        identified and measured 7.7 mm. One stone was visualized        endosonographically in the common bile duct. The stone measured 4 mm        in greatest dimension. The stone was round. It was hyperechoic and        characterized by shadowing.       The portal vein, confluence, superior mesenteric vein and superior        mesenteric artery appeared endosonographically normal. There were no        abnormal lymph nodes in the region.       Pancreatic parenchymal abnormalities were noted in the entire        pancreas. These consisted of hyperechoic strands, hypoechoic foci        and lobularity.       One stone was visualized endosonographically in the common bile        duct. The stone measured 4 mm in greatest dimension. The stone was        round. It was hyperechoic and characterized by shadowing.       Endosonographic imaging in the visualized portion of the liver        showed no lesion.       No lymphadenopathy seen.  Impression:           - Pancreatic parenchymal abnormalities consisting                         of hyperechoic strands, hypoechoic foci and                         lobularity were noted in the entire pancreas,                          probable related to his recent episode of acute                         pancreatitis.                        - One stone was visualized endosonographically in                         the common bile duct.    ERCP:  Findings:       A  film of the abdomen was obtained. The esophagus was        successfully intubated under direct vision without detailed        examination of the pharynx, larynx, and associated structures. The        upper GI tract was traversed under direct vision without detailed        examination. A moderate extrinsic deformity was found in the second        portion of the duodenum. The major papilla was bulging. A 0.025 inch        straight standard wire was passed into the biliary tree. The        short-nosed traction sphincterotome was passed over the guidewire        and the bile duct was then deeply cannulated. Contrast was injected.        I personally interpreted the bile duct images. There was brisk flow        of contrast through the ducts. Image quality was adequate. Contrast        extended to the entire biliary tree. The lower third of the main        bile duct contained one stone mm. The main bile duct was mildly        dilated. The largest diameter was 8 mm. A 4 mm biliary        sphincterotomy was made with a braided traction (standard)        sphincterotome using ERBE electrocautery. There was no        post-sphincterotomy bleeding. To discover objects, the biliary tree        was swept with a 12 mm balloon starting at the bifurcation. Sludge        was swept from the duct. A few stones were removed. No stones        remained. One 10 Fr by 7 cm biliary stent with a single external        flap and a single internal flap was placed into the common bile        duct. Bile flowed through the stent. The stent was in good position.        The total fluoroscopy exposure time was 3.8 minutes.  Impression:           - Duodenal deformity.                        -  The major papilla appeared to be bulging.                        - The entire main bile duct was mildly dilated.                        - Choledocholithiasis was found. Complete removal                         was accomplished by biliary sphincterotomy and                         balloon extraction.                        - A biliary sphincterotomy was performed.                        - The biliary tree was swept.                        - One biliary stent was placed into the common bile                         duct (10 Fr by 7 cm) to maintain adequate drainage                         post endoscopic manipulation.  Recommendation:       - Return patient to hospital [facility] for ongoing care.                        - Avoid aspirin and nonsteroidal anti-inflammatory                         medicines.                        - Watch for pancreatitis, bleeding, perforation,                         and cholangitis.                        - Repeat ERCP in 10 weeks to remove stent.                        - Continue antibiotics for 7-10 days.      For Follow Up:  Persistent Leukocytosis, blood cxs NGTD, reordered today 1/21. No fevers/chills, other signs of infection.     Anemia, as above    Renal function, as above. May need new line placement.     Consults:   Consults (From admission, onward)        Status Ordering Provider     Inpatient consult to [facility] Endoscopy Service (AES)  Once     Provider:  (Not yet assigned)    Completed DOMINICK MCCORMACK     Inpatient consult to Hematology  Once     Provider:  (Not yet assigned)    Completed SIERRA ANDRADE     Inpatient consult to Nephrology  Once     Provider:  (Not yet assigned)    Completed DOMINICK MCCORMACK     Inpatient consult to Social Work/Case Management  Once     Provider:  (Not yet assigned)    Acknowledged SIERRA ANDRADE          Common bile duct stone    - s/p ERCP and stent placement with AES. Repeat ERCP in 10 weeks for stent removal        Acute  "renal failure superimposed on stage 4 chronic kidney disease    Baseline creatinine reportedly 2.5-2.6, up to 7.0 prior to transfer. Cr continues to be elevated compared to baseline but slightly improved today, had some clearance    - pt has swelling on exam  - nephrology consulted  - Pt and family spoke with nehprology and plan is to go back to [facility] and continue dialysis and follow up with pts nephrologist.     Chronic gout of multiple sites    Home medication: allopurinol 100mg daily    - continue       Sickle cell disease without crisis    IRON OVERLOAD    Patient reports innumerable transfusions over the past year. Baseline hemoglobin ~9. Reports he was recently started on Epo injections three times weekly.     Home medications: L-glutamine, exjade. Does not take hydroxyurea "because it does not agree with him". Takes norco about 4 times per week as needed for pain.     - holding exjade, contraindicated with renal failure  - L-glutamine not available in formulary, will have patient take his own medication  - retic count ordered  - Pt consented for blood.  transfused 2 units pRBC over admission  - hematology consulted. Appreciate recs. Holding o/p medications until follow up.                Final Active Diagnoses:    Diagnosis Date Noted POA    Sickle cell disease without crisis [D57.1] 01/16/2019 Yes    Iron overload due to repeated red blood cell transfusions [E83.111] 01/16/2019 Yes    Chronic gout of multiple sites [M1A.09X0] 01/16/2019 Yes    Acute renal failure superimposed on stage 4 chronic kidney disease [N17.9, N18.4] 01/16/2019 Yes    Common bile duct stone [K80.50] 01/16/2019 Yes      Problems Resolved During this Admission:    Diagnosis Date Noted Date Resolved POA    PRINCIPAL PROBLEM:  Acute pancreatitis [K85.90] 01/16/2019 01/21/2019 Yes       Discharged Condition: stable    Disposition:     Follow Up:    Patient Instructions:   No discharge procedures on file.    Significant " Diagnostic Studies: Labs:   BMP:   Recent Labs   Lab 01/20/19  0830 01/20/19  1719 01/21/19  0447    127* 94    134* 135*   K 4.4 4.1 4.3    101 103   CO2 25 24 23   BUN 73* 75* 74*   CREATININE 6.3* 6.1* 5.8*   CALCIUM 9.7 9.7 9.7   MG 1.8  --  1.6   , CMP   Recent Labs   Lab 01/20/19  0830 01/20/19  1719 01/21/19  0447    134* 135*   K 4.4 4.1 4.3    101 103   CO2 25 24 23    127* 94   BUN 73* 75* 74*   CREATININE 6.3* 6.1* 5.8*   CALCIUM 9.7 9.7 9.7   PROT 6.3  --  6.5   ALBUMIN 2.3*  2.3* 2.4* 2.4*  2.4*   BILITOT 9.4*  --  8.0*   ALKPHOS 217*  --  198*   AST 46*  --  36   ALT 56*  --  45*   ANIONGAP 10 9 9   ESTGFRAFRICA 12.7* 13.2* 14.1*   EGFRNONAA 11.0* 11.5* 12.2*   , CBC   Recent Labs   Lab 01/20/19  0830 01/21/19 0447   WBC 24.15* 23.45*   HGB 6.9* 6.8*   HCT 19.4* 19.6*   * 180    and   Hepatic Function Panel    Pending Diagnostic Studies:     Procedure Component Value Units Date/Time    FL ERCP Biliary And Pancreatic [[id number]] Resulted:  01/17/19 1019    Order Status:  Sent Lab Status:  In process Updated:  01/17/19 1238    Hemoglobin Electrophoresis,Hgb A2 Abisai. [[id number]] Collected:  01/19/19 0414    Order Status:  Sent Lab Status:  In process Updated:  01/19/19 0429    Specimen:  Blood     Hepatitis A antibody, IgM [[id number]] Collected:  01/19/19 1350    Order Status:  Sent Lab Status:  In process Updated:  01/19/19 1423    Specimen:  Blood     Hepatitis B core antibody, total [[id number]] Collected:  01/19/19 1350    Order Status:  Sent Lab Status:  In process Updated:  01/19/19 1423    Specimen:  Blood     Hepatitis B surface antibody [[id number]] Collected:  01/19/19 1350    Order Status:  Sent Lab Status:  In process Updated:  01/19/19 1423    Specimen:  Blood     Hepatitis B surface antigen [[id number]] Collected:  01/19/19 1350    Order Status:  Sent Lab Status:  In process Updated:  01/19/19 1423    Specimen:  Blood     Renal function panel  [[id number]] Collected:  01/18/19 0944    Order Status:  Sent Lab Status:  In process Updated:  01/18/19 0944    Specimen:  Blood     Renal function panel [[id number]]     Order Status:  Sent Lab Status:  No result     Specimen:  Blood     US Endoscopic Ultrasound [[id number]] Resulted:  01/17/19 1019    Order Status:  Sent Lab Status:  In process Updated:  01/17/19 1238         Medications:  Reconciled Home Medications:      Medication List      START taking these medications    allopurinol 100 MG tablet  Commonly known as:  ZYLOPRIM  Take 1 tablet (100 mg total) by mouth once daily.     aspirin 81 MG Chew  Take 1 tablet (81 mg total) by mouth once daily.     atorvastatin 40 MG tablet  Commonly known as:  LIPITOR  Take 1 tablet (40 mg total) by mouth once daily.     PIPERACILLIN-TAZOBACTAM 4.5G/100ML SODIUM CHLORIDE 0.9%-READY TO MIX  Inject 100 mLs (4.5 g total) into the vein every 12 (twelve) hours.        STOP taking these medications    calcitRIOL 0.25 MCG Cap  Commonly known as:  ROCALTROL     carvedilol 12.5 MG tablet  Commonly known as:  COREG     colchicine 0.6 mg tablet  Commonly known as:  COLCRYS     deferasirox 125 MG disintegrating tablet  Commonly known as:  EXJADE     diphenhydrAMINE 25 mg capsule  Commonly known as:  BENADRYL     febuxostat 40 mg Tab  Commonly known as:  ULORIC     fluticasone 50 mcg/actuation nasal spray  Commonly known as:  FLONASE     folic acid 1 MG tablet  Commonly known as:  FOLVITE     furosemide 40 MG tablet  Commonly known as:  LASIX     GLUTAMINE ORAL     ibuprofen 200 MG tablet  Commonly known as:  ADVIL,MOTRIN     patiromer calcium sorbitex 8.4 gram Pwpk  Commonly known as:  VELTASSA            Indwelling Lines/Drains at time of discharge:   Lines/Drains/Airways     Central Venous Catheter Line                 Port A Cath Single Lumen left atrial -- days         Hemodialysis Catheter 01/17/19 1041 right subclavian 3 days                Time spent on the discharge of  patient: 35 minutes  Patient was seen and examined on the date of discharge and determined to be suitable for discharge.         Shaan Hightower MD   Internal Medicine PGY-2  [phone]

## 2019-01-21 NOTE — PLAN OF CARE
SW informed by CM that patient now has a bed at [facility] to continue acute care. Patient will be in rrom 730 at [facility].    Transportation arranged via [facility]. Requested pickup time is  3:50 PM. Requested pickup time is not a guarantee of time of arrival.     ELVER Naik aware.    Leeanne Lawson, JONO  [facility]

## 2019-01-21 NOTE — PLAN OF CARE
Call received from Rubén on 5th floor informing this CM that [facility] has contacted her with information for patient's transfer. Patient is going to room 730 and they informed her of the number to call report. Call placed to JUANCARLOS Fallon ([phone]) to assist with setting up transportation thru the [facility] back to [facility]. IM4 team informed of the above conversation.    Hannah Doran RN  Ext [phone]

## 2019-01-21 NOTE — ASSESSMENT & PLAN NOTE
"IRON OVERLOAD    Patient reports innumerable transfusions over the past year. Baseline hemoglobin ~9. Reports he was recently started on Epo injections three times weekly.     Home medications: L-glutamine, exjade. Does not take hydroxyurea "because it does not agree with him". Takes norco about 4 times per week as needed for pain.     - holding exjade, contraindicated with renal failure  - L-glutamine not available in formulary, will have patient take his own medication  - retic count ordered  - Pt consented for blood.  transfused 2 units pRBC over admission  - hematology consulted. Appreciate recs. Holding o/p medications until follow up.           "

## 2019-01-21 NOTE — SUBJECTIVE & OBJECTIVE
Interval History: Pt did not received dialysis this weekend, pt refused new line placement and family's wishes were to be transferred back to [facility].  Will likely be transferred today.    Review of patient's allergies indicates:   Allergen Reactions    Bactrim [sulfamethoxazole-trimethoprim] Hives     Current Facility-Administered Medications   Medication Frequency    0.9%  NaCl infusion (for blood administration) Q24H PRN    allopurinol tablet 100 mg Daily    aspirin chewable tablet 81 mg Daily    dextrose 50% injection 12.5 g PRN    dextrose 50% injection 25 g PRN    glucagon (human recombinant) injection 1 mg PRN    glucose chewable tablet 16 g PRN    glucose chewable tablet 24 g PRN    heparin (porcine) injection 5,000 Units Q8H    HYDROcodone-acetaminophen  mg per tablet 1 tablet Q6H PRN    HYDROcodone-acetaminophen 5-325 mg per tablet 1 tablet Q6H PRN    ondansetron disintegrating tablet 8 mg Q8H PRN    piperacillin-tazobactam 4.5 g in sodium chloride 0.9% 100 mL IVPB (ready to mix system) Q12H    ramelteon tablet 8 mg Nightly PRN    senna-docusate 8.6-50 mg per tablet 1 tablet BID    sodium chloride 0.9% flush 5 mL PRN       Objective:     Vital Signs (Most Recent):  Temp: 96.6 °F (35.9 °C) (01/21/19 0759)  Pulse: 75 (01/21/19 0759)  Resp: 18 (01/21/19 0759)  BP: (!) 134/92 (01/21/19 0759)  SpO2: (!) 94 % (01/21/19 0759)  O2 Device (Oxygen Therapy): room air (01/21/19 0637) Vital Signs (24h Range):  Temp:  [96.4 °F (35.8 °C)-97.8 °F (36.6 °C)] 96.6 °F (35.9 °C)  Pulse:  [75-90] 75  Resp:  [16-20] 18  SpO2:  [90 %-94 %] 94 %  BP: (130-156)/(76-92) 134/92     Weight: 72.2 kg (159 lb 2 oz) (01/17/19 0348)  Body mass index is 25.68 kg/m².  Body surface area is 1.83 meters squared.    I/O last 3 completed shifts:  In: 2028 [P.O.:1810; Blood:218]  Out: 1275 [Urine:1275]    Physical Exam    Significant Labs:  BMP:   Recent Labs   Lab 01/21/19  0447   GLU 94      CO2 23    BUN 74*   CREATININE 5.8*   CALCIUM 9.7   MG 1.6     CBC:   Recent Labs   Lab 01/21/19 0447   WBC 23.45*   RBC 2.42*   HGB 6.8*   HCT 19.6*      MCV 81*   MCH 28.1   MCHC 34.7     CMP:   Recent Labs   Lab 01/21/19 0447   GLU 94   CALCIUM 9.7   ALBUMIN 2.4*  2.4*   PROT 6.5   *   K 4.3   CO2 23      BUN 74*   CREATININE 5.8*   ALKPHOS 198*   ALT 45*   AST 36   BILITOT 8.0*     LFTs:   Recent Labs   Lab 01/21/19 0447   ALT 45*   AST 36   ALKPHOS 198*   BILITOT 8.0*   PROT 6.5   ALBUMIN 2.4*  2.4*     All labs within the past 24 hours have been reviewed.     Significant Imaging:  NA

## 2019-01-22 LAB
BACTERIA BLD CULT: NORMAL
BACTERIA BLD CULT: NORMAL
HGB A2 MFR BLD HPLC: 2.8 %
HGB FRACT BLD ELPH PH6.0-IMP: NORMAL
HGB FRACT BLD ELPH-IMP: NORMAL
HGB FRACT BLD ELPH-IMP: NORMAL

## 2019-01-22 NOTE — PLAN OF CARE
01/22/19 0808   Final Note   Assessment Type Final Discharge Note   Anticipated Discharge Disposition ([facility])   What phone number can be called within the next 1-3 days to see how you are doing after discharge? [phone]   Right Care Referral Info   Post Acute Recommendation Other   Facility Name [facility]

## 2019-01-22 NOTE — PLAN OF CARE
JUANCARLOS informed that [facility] stretcher is unable to take patient because pt is able to sit up. Wheelchair is more appropriate for pt.    JUANCARLOS contacted IM-4 team. JUANCARLOS informed that pt is able to transport via wheelchair to [facility].    SW contacted [facility] to arrange wheelchair van. SW informed that they are working on obtaining a wheelchair van for pt to transport to the hospital.    ELVER Naik aware.     6:15 PM  JUANCARLOS informed by Teresita with  that [facility] will be here within the hour to transport patient to [facility].    Leeanne Lawson, JONO  [facility]

## 2019-01-22 NOTE — NURSING
Pt given discharge instructions.  Report called to [facility].  Pt's mother to follow transport to new facility.  No s/s of distress at this time.

## 2019-01-26 LAB — BACTERIA BLD CULT: NORMAL

## 2019-02-06 ENCOUNTER — TELEPHONE (OUTPATIENT)
Dept: ENDOSCOPY | Facility: HOSPITAL | Age: 29
End: 2019-02-06

## 2019-02-06 NOTE — TELEPHONE ENCOUNTER
----- Message from Jenna Fonseca sent at 2/6/2019  8:46 AM CST -----  Contact: fritz Allyssa magaly burns - [phone]  [place]    Needs Advice    Reason for call: calling to get stent removed        Communication Preference: fritz mckenzie grant - [phone]    Additional Information:

## 2019-02-06 NOTE — TELEPHONE ENCOUNTER
Spoke with patient's mother. ERCP scheduled for 3/21 at 8:30a. Reviewed prep instructions. Ms Pitts verbalized understanding.

## 2019-02-20 ENCOUNTER — TELEPHONE (OUTPATIENT)
Dept: ENDOSCOPY | Facility: HOSPITAL | Age: 29
End: 2019-02-20

## 2019-02-20 NOTE — TELEPHONE ENCOUNTER
Spoke with patient's mother about instructions for ERCP scheduled 3/21/19 at 0830.  Instructions mailed.

## 2019-03-21 ENCOUNTER — HOSPITAL ENCOUNTER (OUTPATIENT)
Facility: HOSPITAL | Age: 29
Discharge: HOME | End: 2019-03-21
Attending: INTERNAL MEDICINE | Admitting: INTERNAL MEDICINE
Payer: MEDICARE

## 2019-03-21 ENCOUNTER — ANESTHESIA (OUTPATIENT)
Dept: ENDOSCOPY | Facility: HOSPITAL | Age: 29
End: 2019-03-21
Payer: MEDICARE

## 2019-03-21 ENCOUNTER — ANESTHESIA EVENT (OUTPATIENT)
Dept: ENDOSCOPY | Facility: HOSPITAL | Age: 29
End: 2019-03-21
Payer: MEDICARE

## 2019-03-21 VITALS
WEIGHT: 155 LBS | HEIGHT: 67 IN | SYSTOLIC BLOOD PRESSURE: 117 MMHG | DIASTOLIC BLOOD PRESSURE: 77 MMHG | HEART RATE: 75 BPM | RESPIRATION RATE: 16 BRPM | TEMPERATURE: 99 F | BODY MASS INDEX: 24.33 KG/M2 | OXYGEN SATURATION: 98 %

## 2019-03-21 DIAGNOSIS — K80.50 CHOLEDOCHOLITHIASIS: ICD-10-CM

## 2019-03-21 DIAGNOSIS — K80.50 COMMON BILE DUCT STONE: Primary | ICD-10-CM

## 2019-03-21 PROCEDURE — D9220A PRA ANESTHESIA: Mod: ANES,,, | Performed by: ANESTHESIOLOGY

## 2019-03-21 PROCEDURE — 43275 PR ERCP W/REMOVAL FOREIGN BODY/STENT FROM BILIARY/PANCREATIC DUCT: ICD-10-PCS | Mod: ,,, | Performed by: INTERNAL MEDICINE

## 2019-03-21 PROCEDURE — 00731 ANES UPR GI NDSC PX NOS: CPT | Performed by: INTERNAL MEDICINE

## 2019-03-21 PROCEDURE — 43275 ERCP REMOVE FORGN BODY DUCT: CPT | Mod: ,,, | Performed by: INTERNAL MEDICINE

## 2019-03-21 PROCEDURE — 27000221 HC OXYGEN, UP TO 24 HOURS

## 2019-03-21 PROCEDURE — D9220A PRA ANESTHESIA: Mod: CRNA,,, | Performed by: NURSE ANESTHETIST, CERTIFIED REGISTERED

## 2019-03-21 PROCEDURE — 74328 X-RAY BILE DUCT ENDOSCOPY: CPT | Mod: 26,,, | Performed by: INTERNAL MEDICINE

## 2019-03-21 PROCEDURE — 25000003 PHARM REV CODE 250: Performed by: INTERNAL MEDICINE

## 2019-03-21 PROCEDURE — 63600175 PHARM REV CODE 636 W HCPCS: Performed by: NURSE ANESTHETIST, CERTIFIED REGISTERED

## 2019-03-21 PROCEDURE — 27201089 HC SNARE, DISP (ANY): Performed by: INTERNAL MEDICINE

## 2019-03-21 PROCEDURE — 74328 X-RAY BILE DUCT ENDOSCOPY: CPT | Performed by: INTERNAL MEDICINE

## 2019-03-21 PROCEDURE — 63600175 PHARM REV CODE 636 W HCPCS: Performed by: ANESTHESIOLOGY

## 2019-03-21 PROCEDURE — 37000009 HC ANESTHESIA EA ADD 15 MINS: Performed by: INTERNAL MEDICINE

## 2019-03-21 PROCEDURE — 37000008 HC ANESTHESIA 1ST 15 MINUTES: Performed by: INTERNAL MEDICINE

## 2019-03-21 PROCEDURE — 74328 PR  X-RAY FOR BILE DUCT ENDOSCOPY: ICD-10-PCS | Mod: 26,,, | Performed by: INTERNAL MEDICINE

## 2019-03-21 PROCEDURE — D9220A PRA ANESTHESIA: ICD-10-PCS | Mod: ANES,,, | Performed by: ANESTHESIOLOGY

## 2019-03-21 PROCEDURE — C1769 GUIDE WIRE: HCPCS | Performed by: INTERNAL MEDICINE

## 2019-03-21 PROCEDURE — 94761 N-INVAS EAR/PLS OXIMETRY MLT: CPT

## 2019-03-21 PROCEDURE — 43275 ERCP REMOVE FORGN BODY DUCT: CPT | Performed by: INTERNAL MEDICINE

## 2019-03-21 PROCEDURE — D9220A PRA ANESTHESIA: ICD-10-PCS | Mod: CRNA,,, | Performed by: NURSE ANESTHETIST, CERTIFIED REGISTERED

## 2019-03-21 PROCEDURE — 27202125 HC BALLOON, EXTRACTION (ANY): Performed by: INTERNAL MEDICINE

## 2019-03-21 RX ORDER — MEPERIDINE HYDROCHLORIDE 50 MG/ML
12.5 INJECTION INTRAMUSCULAR; INTRAVENOUS; SUBCUTANEOUS ONCE AS NEEDED
Status: DISCONTINUED | OUTPATIENT
Start: 2019-03-21 | End: 2019-03-21 | Stop reason: HOSPADM

## 2019-03-21 RX ORDER — FOLIC ACID 1 MG/1
1 TABLET ORAL DAILY
COMMUNITY
End: 2020-10-28

## 2019-03-21 RX ORDER — SODIUM CHLORIDE 9 MG/ML
INJECTION, SOLUTION INTRAVENOUS CONTINUOUS
Status: DISCONTINUED | OUTPATIENT
Start: 2019-03-21 | End: 2019-03-21 | Stop reason: HOSPADM

## 2019-03-21 RX ORDER — CIPROFLOXACIN 2 MG/ML
INJECTION, SOLUTION INTRAVENOUS
Status: DISCONTINUED | OUTPATIENT
Start: 2019-03-21 | End: 2019-03-21

## 2019-03-21 RX ORDER — PROPOFOL 10 MG/ML
VIAL (ML) INTRAVENOUS CONTINUOUS PRN
Status: DISCONTINUED | OUTPATIENT
Start: 2019-03-21 | End: 2019-03-21

## 2019-03-21 RX ORDER — LORAZEPAM 2 MG/ML
0.25 INJECTION INTRAMUSCULAR ONCE AS NEEDED
Status: DISCONTINUED | OUTPATIENT
Start: 2019-03-21 | End: 2019-03-21 | Stop reason: HOSPADM

## 2019-03-21 RX ORDER — SODIUM CHLORIDE 0.9 % (FLUSH) 0.9 %
3 SYRINGE (ML) INJECTION
Status: DISCONTINUED | OUTPATIENT
Start: 2019-03-21 | End: 2019-03-21 | Stop reason: HOSPADM

## 2019-03-21 RX ORDER — CIPROFLOXACIN 500 MG/1
500 TABLET ORAL EVERY 12 HOURS
Qty: 10 TABLET | Refills: 0 | Status: SHIPPED | OUTPATIENT
Start: 2019-03-21 | End: 2019-03-26

## 2019-03-21 RX ORDER — HYDROMORPHONE HYDROCHLORIDE 1 MG/ML
0.2 INJECTION, SOLUTION INTRAMUSCULAR; INTRAVENOUS; SUBCUTANEOUS EVERY 5 MIN PRN
Status: DISCONTINUED | OUTPATIENT
Start: 2019-03-21 | End: 2019-03-21 | Stop reason: HOSPADM

## 2019-03-21 RX ORDER — FEBUXOSTAT 40 MG/1
40 TABLET, FILM COATED ORAL DAILY
COMMUNITY
End: 2019-06-27 | Stop reason: ALTCHOICE

## 2019-03-21 RX ORDER — CARVEDILOL 12.5 MG/1
12.5 TABLET ORAL DAILY
COMMUNITY
End: 2019-09-25

## 2019-03-21 RX ORDER — PROPOFOL 10 MG/ML
VIAL (ML) INTRAVENOUS
Status: DISCONTINUED | OUTPATIENT
Start: 2019-03-21 | End: 2019-03-21

## 2019-03-21 RX ADMIN — PROPOFOL 50 MG: 10 INJECTION, EMULSION INTRAVENOUS at 08:03

## 2019-03-21 RX ADMIN — HYDROMORPHONE HYDROCHLORIDE 0.2 MG: 1 INJECTION, SOLUTION INTRAMUSCULAR; INTRAVENOUS; SUBCUTANEOUS at 09:03

## 2019-03-21 RX ADMIN — PROPOFOL 140 MCG/KG/MIN: 10 INJECTION, EMULSION INTRAVENOUS at 08:03

## 2019-03-21 RX ADMIN — PROPOFOL 25 MG: 10 INJECTION, EMULSION INTRAVENOUS at 08:03

## 2019-03-21 RX ADMIN — SODIUM CHLORIDE: 0.9 INJECTION, SOLUTION INTRAVENOUS at 08:03

## 2019-03-21 RX ADMIN — HYDROMORPHONE HYDROCHLORIDE 0.2 MG: 1 INJECTION, SOLUTION INTRAMUSCULAR; INTRAVENOUS; SUBCUTANEOUS at 10:03

## 2019-03-21 RX ADMIN — CIPROFLOXACIN 400 MG: 2 INJECTION, SOLUTION INTRAVENOUS at 09:03

## 2019-03-21 NOTE — DISCHARGE SUMMARY
Discharge Summary/Instructions after an Endoscopic Procedure    Patient Name: Ambrosio Newman  Patient MRN: [id number]  Patient YOB: 1990 Thursday, March 21, 2019  Dickson Coto MD    RESTRICTIONS:  During your procedure today, you received medications for sedation.  These medications may affect your judgment, balance and coordination.  Therefore, for 24 hours, you have the following restrictions:     - DO NOT drive a car, operate machinery, make legal/financial decisions, sign important papers or drink alcohol.      ACTIVITY:  Today: no heavy lifting, straining or running due to procedural sedation/anesthesia.  The following day: return to full activity including work.    DIET:  Eat and drink normally unless instructed otherwise.     TREATMENT FOR COMMON SIDE EFFECTS:  - Mild abdominal pain, nausea, belching, bloating or excessive gas:  rest, eat lightly and use a heating pad.  - Sore Throat: treat with throat lozenges and/or gargle with warm salt water.  - Because air was used during the procedure, expelling large amounts of air from your rectum or belching is normal.  - If a bowel prep was taken, you may not have a bowel movement for 1-3 days.  This is normal.      SYMPTOMS TO WATCH FOR AND REPORT TO YOUR PHYSICIAN:  1. Abdominal pain or bloating, other than gas cramps.  2. Chest pain.  3. Back pain.  4. Signs of infection such as: chills or fever occurring within 24 hours after the procedure.  5. Rectal bleeding, which would show as bright red, maroon, or black stools. (A tablespoon of blood from the rectum is not serious, especially if hemorrhoids are present.)  6. Vomiting.  7. Weakness or dizziness.      GO DIRECTLY TO THE NEAREST EMERGENCY ROOM IF YOU HAVE ANY OF THE FOLLOWING:     Difficulty breathing              Chills and/or fever over 101 F   Persistent vomiting and/or vomiting blood   Severe abdominal pain   Severe chest pain   Black, tarry stools   Bleeding- more than one  tablespoon   Any other symptom or condition that you feel may need urgent attention    Your doctor recommends these additional instructions:  If any biopsies were taken, your doctors clinic will contact you in 1 to 2 weeks with any results.    - Avoid aspirin and nonsteroidal anti-inflammatory medicines.   - Discharge patient to home.   - Watch for pancreatitis, bleeding, perforation, and cholangitis.   - Observe patient's clinical course.   - Check liver enzymes (AST, ALT, alkaline phosphatase, bilirubin) in 1 week.   - Cipro (ciprofloxacin) 500 mg PO BID for 5 days.    For questions, problems or results please call your physician - Dickson Coto MD at Work:  [phone].    [facility], EMERGENCY ROOM PHONE NUMBER: [phone]    IF A COMPLICATION OR EMERGENCY SITUATION ARISES AND YOU ARE UNABLE TO REACH YOUR PHYSICIAN - GO DIRECTLY TO THE EMERGENCY ROOM.

## 2019-03-21 NOTE — DISCHARGE INSTRUCTIONS
ERCP (Endoscopic Retrograde Cholangiopancreatography)     A balloon at the tip of a catheter opens above the stone. The stone is pulled out of the duct and leaves your body through stool.     ERCP stands for endoscopic retrograde cholangiopancreatography. This procedure is used to view the biliary and pancreatic ducts.  It is used to evaluate diseases that affect the ducts and to help locate and treat blockages that may be present.  How do I get ready for ERCP?  · Talk to your doctor about any health problems or allergies you have.  · Ask your doctor about the risks of ERCP. These include pancreatitis, infection, bleeding, and tearing the bowel.  · Be sure your doctor knows about all medicines you take. You may be told to stop taking some or all of them before the test. This includes:  · All prescription medicines  · Over-the-counter medicines that don't need a prescription  ·  Any street drugs you may use   · Herbs, vitamins, kelp, seaweed, cough syrups, and other supplements  · You may be asked to take antibiotics ahead of time.  · Avoid blood-thinning medicines for 1 week before ERCP.  · Do not eat or drink for 8 to 12 hours before ERCP.  · Have someone ready to take you home.  What happens during the procedure?  · You may be given medicine through an IV to help you relax.  · Your throat is numbed.  · A thin tube (endoscope) is placed into your throat. It is advanced from the throat through the upper digestive tract, to the common bile duct opening. The endoscope lets the doctor see the common bile and pancreatic ducts on a video screen.  · A cut may be made where the common bile duct opens to the duodenum to make it easier to remove stones.  · As blockages are located and removed, X-rays are taken.  · Contrast dye is injected through a catheter to make the duct show up better on the X-rays.  · An imaging technique that uses X-rays to obtain real-time moving images of internal organs is called fluoroscopy.  Fluoroscopy is used to watch and guide progress of the procedure.   · In some cases, a plastic tube (stent) is placed to hold the ducts open. This stent may be replaced or removed in 6 to 8 weeks. Or it may be left to fall out on its own and be passed in the stool.  What happens after ERCP?  Your doctor may discuss the test results right away or a return visit may be scheduled. You may go home the same day or spend the night in the hospital. Follow these tips:  · You can return to a normal routine the day after the ERCP.  · If a cut was made in the duct, avoid blood-thinning medicines such as aspirin for 5 to 7 days.  · Call your doctor right away if you have a fever or abdominal pain. These may be signs of an infection or torn bowel.   Date Last Reviewed: 6/19/2015  © 3974-2424 [employer]. [address] [place] [address], [place] [address]. All rights reserved. This information is not intended as a substitute for professional medical care. Always follow your healthcare professional's instructions.

## 2019-03-21 NOTE — ANESTHESIA POSTPROCEDURE EVALUATION
"Anesthesia Post Evaluation    Patient: Ambrosio Newman    Procedure(s) Performed: Procedure(s) (LRB):  ERCP (ENDOSCOPIC RETROGRADE CHOLANGIOPANCREATOGRAPHY) (N/A)    Final Anesthesia Type: general  Patient location during evaluation: PACU  Patient participation: Yes- Able to Participate  Level of consciousness: awake and alert  Post-procedure vital signs: reviewed and stable  Pain management: adequate  Airway patency: patent  PONV status at discharge: No PONV  Anesthetic complications: no      Cardiovascular status: blood pressure returned to baseline  Respiratory status: spontaneous ventilation and room air  Hydration status: euvolemic  Follow-up not needed.        Visit Vitals  BP 98/61   Pulse 75   Temp 37.1 °C (98.8 °F) (Temporal)   Resp 16   Ht 5' 7" (1.702 m)   Wt 70.3 kg (155 lb)   SpO2 98%   BMI 24.28 kg/m²       Pain/Tamiko Score: Pain Rating Prior to Med Admin: 4 (3/21/2019 10:05 AM)  Tamiko Score: 10 (3/21/2019 10:25 AM)        "

## 2019-03-21 NOTE — H&P
History & Physical - Short Stay  Gastroenterology      SUBJECTIVE:     Procedure: ERCP    Chief Complaint/Indication for Procedure: Stent removal     History of Present Illness:  Patient is a 29 y.o. male presents with recent ERCP for choledocholithiasis S/P stone extraction and stent placement here for stent removal.     PTA Medications   Medication Sig    allopurinol (ZYLOPRIM) 100 MG tablet Take 1 tablet (100 mg total) by mouth once daily.    carvedilol (COREG) 12.5 MG tablet Take 12.5 mg by mouth once.    febuxostat (ULORIC) 40 mg Tab Take 40 mg by mouth once daily.    folic acid (FOLVITE) 1 MG tablet Take 1 mg by mouth once daily.    aspirin 81 MG Chew Take 1 tablet (81 mg total) by mouth once daily.    atorvastatin (LIPITOR) 40 MG tablet Take 1 tablet (40 mg total) by mouth once daily.    piperacillin sodium/tazobactam (PIPERACILLIN-TAZOBACTAM 4.5G/100ML SODIUM CHLORIDE 0.9%-READY TO MIX) Inject 100 mLs (4.5 g total) into the vein every 12 (twelve) hours.       Review of patient's allergies indicates:   Allergen Reactions    Bactrim [sulfamethoxazole-trimethoprim] Hives        Past Medical History:   Diagnosis Date    Choledocholithiasis     Chronic gout of multiple sites 1/16/2019    CKD (chronic kidney disease), stage IV     CVA (cerebral vascular accident)     Gout     HTN (hypertension)     Iron overload     Iron overload due to repeated red blood cell transfusions 1/16/2019    Sickle cell anemia     Sickle cell disease without crisis 1/16/2019     Past Surgical History:   Procedure Laterality Date    CHOLECYSTECTOMY      ERCP (ENDOSCOPIC RETROGRADE CHOLANGIOPANCREATOGRAPHY) N/A 1/17/2019    Performed by Dickson Coto MD at [facility] ENDO (2ND FLR)    ruptured gastric ulcer with amy patch      TONSILLECTOMY      ULTRASOUND, ENDOSCOPIC, UPPER GI TRACT N/A 1/17/2019    Performed by Dickson Coto MD at [facility] ENDO (2ND FLR)     History reviewed. No pertinent family  history.  Social History     Tobacco Use    Smoking status: Never Smoker    Smokeless tobacco: Never Used   Substance Use Topics    Alcohol use: No     Frequency: Never    Drug use: No       Review of Systems:  Respiratory: no cough or shortness of breath  Cardiovascular: no chest pain or palpitations  Gastrointestinal: negative for abdominal pain and diarrhea       OBJECTIVE:     Vital Signs (Most Recent)  Temp: 98.6 °F (37 °C) (03/21/19 0744)  Pulse: 78 (03/21/19 0744)  Resp: 18 (03/21/19 0744)  BP: 121/73 (03/21/19 0744)  SpO2: 100 % (03/21/19 0744)    Physical Exam:  General: well developed  Lungs:  normal respiratory effort  Heart: regular rate, S1, S2 normal  Abdomen: soft, non-tender non-distented; bowel sounds normal; no masses,  no organomegaly    Laboratory  CBC: No results for input(s): WBC, RBC, HGB, HCT, PLT, MCV, MCH, MCHC in the last 168 hours.  CMP: No results for input(s): GLU, CALCIUM, ALBUMIN, PROT, NA, K, CO2, CL, BUN, CREATININE, ALKPHOS, ALT, AST, BILITOT in the last 168 hours.  Coagulation: No results for input(s): LABPROT, INR, APTT in the last 168 hours.      Diagnostic Results:      ASSESSMENT/PLAN:     Choledocholithiasis    Plan: ERCP    Anesthesia Plan: MAC    ASA Grade: ASA 3 - Patient with moderate systemic disease with functional limitations     The impression and plan was discussed in detail with the patient and family. All questions have been answered and the patient voices understanding of our plan at this point. The risk of the procedure was discussed in detail which includes but not limited to bleeding, infection, perforation in some cases requiring surgery with its spectrum of complications.

## 2019-03-21 NOTE — TRANSFER OF CARE
"Anesthesia Transfer of Care Note    Patient: Ambrosio Newman    Procedure(s) Performed: Procedure(s) (LRB):  ERCP (ENDOSCOPIC RETROGRADE CHOLANGIOPANCREATOGRAPHY) (N/A)    Patient location: PACU    Anesthesia Type: general    Transport from OR: Transported from OR on 2-3 L/min O2 by NC with adequate spontaneous ventilation    Post pain: adequate analgesia    Post assessment: no apparent anesthetic complications and tolerated procedure well    Post vital signs: stable    Level of consciousness: sedated    Nausea/Vomiting: no nausea/vomiting    Complications: none    Transfer of care protocol was followed      Last vitals:   Visit Vitals  /63   Pulse 90   Temp 37.3 °C (99.1 °F) (Temporal)   Resp 18   Ht 5' 7" (1.702 m)   Wt 70.3 kg (155 lb)   SpO2 98%   BMI 24.28 kg/m²     "

## 2019-03-21 NOTE — [employer] PATIENT INSTRUCTIONS
Discharge Summary/Instructions after an Endoscopic Procedure  Patient Name: Ambrosio Newman  Patient MRN: [id number]  Patient YOB: 1990 Thursday, March 21, 2019  Dickson Coto MD  RESTRICTIONS:  During your procedure today, you received medications for sedation.  These   medications may affect your judgment, balance and coordination.  Therefore,   for 24 hours, you have the following restrictions:   - DO NOT drive a car, operate machinery, make legal/financial decisions,   sign important papers or drink alcohol.    ACTIVITY:  Today: no heavy lifting, straining or running due to procedural   sedation/anesthesia.  The following day: return to full activity including work.  DIET:  Eat and drink normally unless instructed otherwise.     TREATMENT FOR COMMON SIDE EFFECTS:  - Mild abdominal pain, nausea, belching, bloating or excessive gas:  rest,   eat lightly and use a heating pad.  - Sore Throat: treat with throat lozenges and/or gargle with warm salt   water.  - Because air was used during the procedure, expelling large amounts of air   from your rectum or belching is normal.  - If a bowel prep was taken, you may not have a bowel movement for 1-3 days.    This is normal.  SYMPTOMS TO WATCH FOR AND REPORT TO YOUR PHYSICIAN:  1. Abdominal pain or bloating, other than gas cramps.  2. Chest pain.  3. Back pain.  4. Signs of infection such as: chills or fever occurring within 24 hours   after the procedure.  5. Rectal bleeding, which would show as bright red, maroon, or black stools.   (A tablespoon of blood from the rectum is not serious, especially if   hemorrhoids are present.)  6. Vomiting.  7. Weakness or dizziness.  GO DIRECTLY TO THE NEAREST EMERGENCY ROOM IF YOU HAVE ANY OF THE FOLLOWING:      Difficulty breathing              Chills and/or fever over 101 F   Persistent vomiting and/or vomiting blood   Severe abdominal pain   Severe chest pain   Black, tarry stools   Bleeding- more than one  tablespoon   Any other symptom or condition that you feel may need urgent attention  Your doctor recommends these additional instructions:  If any biopsies were taken, your doctors clinic will contact you in 1 to 2   weeks with any results.  - Avoid aspirin and nonsteroidal anti-inflammatory medicines.   - Discharge patient to home.   - Watch for pancreatitis, bleeding, perforation, and cholangitis.   - Observe patient's clinical course.   - Check liver enzymes (AST, ALT, alkaline phosphatase, bilirubin) in 1 week.     - Cipro (ciprofloxacin) 500 mg PO BID for 5 days.  For questions, problems or results please call your physician - Dickson Coto MD at Work:  [phone].  [facility], EMERGENCY ROOM PHONE NUMBER: [phone]  IF A COMPLICATION OR EMERGENCY SITUATION ARISES AND YOU ARE UNABLE TO REACH   YOUR PHYSICIAN - GO DIRECTLY TO THE EMERGENCY ROOM.  Dickson Coto MD  3/21/2019 9:21:05 AM  This report has been verified and signed electronically.  [employer]

## 2019-03-21 NOTE — ANESTHESIA PREPROCEDURE EVALUATION
03/21/2019  Ambrosio Newman is a 29 y.o., male.    Anesthesia Evaluation    I have reviewed the Patient Summary Reports.    I have reviewed the Nursing Notes.      Review of Systems  Anesthesia Hx:  No problems with previous Anesthesia    Hematology/Oncology:  Hematology Normal   Oncology Normal   Hematology Comments: Sickle cell disease without crisis    EENT/Dental:EENT/Dental Normal   Cardiovascular:   Hypertension    Pulmonary:  Pulmonary Normal    Renal/:   Chronic Renal Disease, CRI Acute renal failure superimposed on stage 4 chronic kidney disease   Hepatic/GI:  Hepatic/GI Normal    Musculoskeletal:  Musculoskeletal Normal    Neurological:   CVA    Endocrine:  Endocrine Normal    Dermatological:  Skin Normal    Psych:  Psychiatric Normal           Physical Exam  General:  Well nourished    Airway/Jaw/Neck:  Airway Findings: Mouth Opening: Normal Tongue: Normal  General Airway Assessment: Adult  Mallampati: III  Improves to II with phonation.  TM Distance: Normal, at least 6 cm        Eyes/Ears/Nose:  EYES/EARS/NOSE FINDINGS: Normal   Dental:  Dental Findings: In tact   Chest/Lungs:  Chest/Lungs Clear    Heart/Vascular:  Heart Findings: Normal Heart murmur: negative Vascular Findings: Normal    Abdomen:  Abdomen Findings: Normal    Musculoskeletal:  Musculoskeletal Findings: Normal   Skin:  Skin Findings: Normal    Mental Status:  Mental Status Findings: Normal        Anesthesia Plan  Type of Anesthesia, risks & benefits discussed:  Anesthesia Type:  general  Patient's Preference:   Intra-op Monitoring Plan:   Intra-op Monitoring Plan Comments:   Post Op Pain Control Plan:   Post Op Pain Control Plan Comments:   Induction:   IV  Beta Blocker:  Patient is not currently on a Beta-Blocker (No further documentation required).       Informed Consent: Patient understands risks and agrees with Anesthesia  plan.  Questions answered. Anesthesia consent signed with patient.  ASA Score: 3     Day of Surgery Review of History & Physical:    H&P update referred to the surgeon.         Ready For Surgery From Anesthesia Perspective.

## 2019-03-21 NOTE — PLAN OF CARE
Pt and family given dc instructions. Dr Coto spoke to pt and family at bedside. Understanding verbalized. Pt with no c/o pain. No nausea. Pt going home with family.

## 2019-03-27 ENCOUNTER — OFFICE VISIT (OUTPATIENT)
Dept: SURGERY | Facility: CLINIC | Age: 29
End: 2019-03-27
Payer: MEDICARE

## 2019-03-27 ENCOUNTER — TELEPHONE (OUTPATIENT)
Dept: ENDOSCOPY | Facility: HOSPITAL | Age: 29
End: 2019-03-27

## 2019-03-27 VITALS
SYSTOLIC BLOOD PRESSURE: 123 MMHG | DIASTOLIC BLOOD PRESSURE: 66 MMHG | WEIGHT: 159.19 LBS | BODY MASS INDEX: 24.99 KG/M2 | HEIGHT: 67 IN

## 2019-03-27 DIAGNOSIS — R79.89 ABNORMAL LFTS: Primary | ICD-10-CM

## 2019-03-27 DIAGNOSIS — N18.6 END STAGE RENAL DISEASE: Primary | ICD-10-CM

## 2019-03-27 PROCEDURE — 3008F PR BODY MASS INDEX (BMI) DOCUMENTED: ICD-10-PCS | Mod: CPTII,NTX,S$GLB, | Performed by: SURGERY

## 2019-03-27 PROCEDURE — 3008F BODY MASS INDEX DOCD: CPT | Mod: CPTII,NTX,S$GLB, | Performed by: SURGERY

## 2019-03-27 PROCEDURE — 99203 PR OFFICE/OUTPT VISIT, NEW, LEVL III, 30-44 MIN: ICD-10-PCS | Mod: NTX,S$GLB,, | Performed by: SURGERY

## 2019-03-27 PROCEDURE — 99203 OFFICE O/P NEW LOW 30 MIN: CPT | Mod: NTX,S$GLB,, | Performed by: SURGERY

## 2019-03-27 RX ORDER — FEBUXOSTAT 40 MG/1
TABLET, FILM COATED ORAL
COMMUNITY
End: 2019-06-27 | Stop reason: ALTCHOICE

## 2019-03-27 RX ORDER — CARVEDILOL 12.5 MG/1
TABLET ORAL
COMMUNITY
End: 2019-06-27 | Stop reason: ALTCHOICE

## 2019-03-27 RX ORDER — DEFERASIROX 500 MG/1
TABLET, FOR SUSPENSION ORAL
COMMUNITY
End: 2019-06-27 | Stop reason: ALTCHOICE

## 2019-03-27 RX ORDER — CALCITRIOL 0.25 UG/1
CAPSULE ORAL
COMMUNITY
End: 2019-06-27 | Stop reason: ALTCHOICE

## 2019-03-27 RX ORDER — FOLIC ACID 1 MG/1
TABLET ORAL
COMMUNITY
End: 2019-06-27 | Stop reason: SDUPTHER

## 2019-03-27 RX ORDER — HYDROCODONE BITARTRATE AND ACETAMINOPHEN 5; 325 MG/1; MG/1
TABLET ORAL
COMMUNITY
End: 2019-06-27 | Stop reason: ALTCHOICE

## 2019-03-27 NOTE — PROGRESS NOTES
History & Physical    SUBJECTIVE:     History of Present Illness:    29-year-old  male referred by Dr. Keaton Odom for insertion of peritoneal dialysis catheter.  Patient has end-stage renal disease stage IV related to sickle cell disease.  He is not yet on hemodialysis.  His estimated GFR is 12% on his most recent lab work.  He has had prior abdominal surgery having had a upper abdominal incision for perforated gastric ulcer as well as a laparoscopic cholecystectomy.          Chief Complaint   Patient presents with    Other     pd cath         Review of patient's allergies indicates:  Review of patient's allergies indicates:   Allergen Reactions    Bactrim [sulfamethoxazole-trimethoprim] Hives       Current Outpatient Medications on File Prior to Visit   Medication Sig Dispense Refill    aspirin 81 MG Chew Take 1 tablet (81 mg total) by mouth once daily.  0    calcitRIOL (ROCALTROL) 0.25 MCG Cap calcitriol 0.25 mcg capsule      carvedilol (COREG) 12.5 MG tablet Take 12.5 mg by mouth once.      colchicine 0.3 mg tablet colchicine   AS Directed      deferasirox (EXJADE) 500 MG disintegrating tablet Exjade 500 mg dispersible tablet      febuxostat (ULORIC) 40 mg Tab Take 40 mg by mouth once daily.      folic acid (FOLVITE) 1 MG tablet Take 1 mg by mouth once daily.      glutamine, sickle cell, (ENDARI) 5 gram PwPk Endari 5 gram oral powder packet      HYDROcodone-acetaminophen (NORCO)  mg per tablet hydrocodone 10 mg-acetaminophen 325 mg tablet      patiromer calcium sorbitex (VELTASSA) 8.4 gram PwPk Veltassa 8.4 gram oral powder packet      allopurinol (ZYLOPRIM) 100 MG tablet Take 1 tablet (100 mg total) by mouth once daily.      atorvastatin (LIPITOR) 40 MG tablet Take 1 tablet (40 mg total) by mouth once daily. 90 tablet 3    carvedilol (COREG) 12.5 MG tablet carvedilol 12.5 mg tablet      [] ciprofloxacin HCl (CIPRO) 500 MG tablet Take 1 tablet (500 mg total) by mouth  every 12 (twelve) hours. for 5 days 10 tablet 0    febuxostat (ULORIC) 40 mg Tab Uloric 40 mg tablet      folic acid (FOLVITE) 1 MG tablet folic acid 1 mg tablet   Take 1 tablet every day by oral route.      piperacillin sodium/tazobactam (PIPERACILLIN-TAZOBACTAM 4.5G/100ML SODIUM CHLORIDE 0.9%-READY TO MIX) Inject 100 mLs (4.5 g total) into the vein every 12 (twelve) hours.       No current facility-administered medications on file prior to visit.        Past Medical History:   Diagnosis Date    Choledocholithiasis     Chronic gout of multiple sites 1/16/2019    CKD (chronic kidney disease), stage IV     CVA (cerebral vascular accident)     Gout     HTN (hypertension)     Iron overload     Iron overload due to repeated red blood cell transfusions 1/16/2019    Sickle cell anemia     Sickle cell disease without crisis 1/16/2019     Past Surgical History:   Procedure Laterality Date    CHOLECYSTECTOMY      ERCP (ENDOSCOPIC RETROGRADE CHOLANGIOPANCREATOGRAPHY) N/A 3/21/2019    Performed by Dickson [facility]oto [facility] at [facility]O (2ND FLR)    ERCP (ENDOSCOPIC RETROGRADE CHOLANGIOPANCREATOGRAPHY) N/A 1/17/2019    Performed by Dickson Coto MD at [facility] ENDO (2ND FLR)    ruptured gastric ulcer with amy patch      TONSILLECTOMY      TUNNELED VENOUS PORT PLACEMENT      ULTRASOUND, ENDOSCOPIC, UPPER GI TRACT N/A 1/17/2019    [facility] at [facility] ([facility])     Family History   Problem Relation Age of Onset    Diabetes Mother     Diabetes Sister     Diabetes Maternal Grandmother     Heart disease Maternal Grandfather        Social History     Socioeconomic History    Marital status: Unknown     Spouse name: Not on file    Number of children: Not on file    Years of education: Not on file    Highest education level: Not on file   Occupational History    Not on file   Social Needs    Financial resource strain: Not on file    Food insecurity:     Worry: Not on file      Inability: Not on file    Transportation needs:     Medical: Not on file     Non-medical: Not on file   Tobacco Use    Smoking status: Never Smoker    Smokeless tobacco: Never Used   Substance and Sexual Activity    Alcohol use: No     Frequency: Never    Drug use: No    Sexual activity: Not on file   Lifestyle    Physical activity:     Days per week: Not on file     Minutes per session: Not on file    Stress: Not on file   Relationships    Social connections:     Talks on phone: Not on file     Gets together: Not on file     Attends Baptism service: Not on file     Active member of club or organization: Not on file     Attends meetings of clubs or organizations: Not on file     Relationship status: Not on file    Intimate partner violence:     Fear of current or ex partner: Not on file     Emotionally abused: Not on file     Physically abused: Not on file     Forced sexual activity: Not on file   Other Topics Concern    Not on file   Social History Narrative    Not on file          Review of Systems   Constitutional: Negative for chills and fever.   Respiratory: Negative for cough and shortness of breath.    Cardiovascular: Positive for leg swelling. Negative for chest pain and palpitations.   Gastrointestinal: Negative for abdominal pain, heartburn, nausea and vomiting.   Genitourinary: Negative for dysuria.   Musculoskeletal: Positive for joint pain.   Skin: Negative for itching and rash.   Endo/Heme/Allergies: Does not bruise/bleed easily.       OBJECTIVE:     Vitals:    03/27/19 1437   BP: 123/66                 Physical Exam:  Physical Exam   Constitutional: He is oriented to person, place, and time and well-developed, well-nourished, and in no distress.   HENT:   Head: Normocephalic and atraumatic.   Eyes: Pupils are equal, round, and reactive to light.   Neck: Neck supple. No thyromegaly present.   Cardiovascular: Normal rate, regular rhythm and normal heart sounds.   Pulmonary/Chest: Breath  sounds normal. No respiratory distress.   Abdominal: Soft. Bowel sounds are normal. He exhibits no distension. There is no tenderness. There is no rebound.   Upper midline abdominal scar with no hernia.   Musculoskeletal: Normal range of motion.   Neurological: He is alert and oriented to person, place, and time. He has intact cranial nerves.   Skin: Skin is warm, dry and intact.           ASSESSMENT/PLAN:   End-stage renal disease needing tunneled peritoneal dialysis catheter insertion. Sickle cell disease.  PLAN:  Will place tunneled peritoneal dialysis catheter April 11, 2019.  Procedure, risks and benefits, and expected postoperative course discussed with patient and mother

## 2019-04-04 ENCOUNTER — TELEPHONE (OUTPATIENT)
Dept: ENDOSCOPY | Facility: HOSPITAL | Age: 29
End: 2019-04-04

## 2019-04-04 NOTE — TELEPHONE ENCOUNTER
MD Adriana Friedman MA   Caller: Unspecified (Today, 12:30 PM)             Please let the patient's mother know that I spoke with Dr Posada. He will repeat the LFT's on Monday. If no improvement he will order an MRCP and depending on the findings we will decide regarding EUS/ERCP.   Dickson Coto MD      Spoke with patient's mother. Informed of above

## 2019-04-04 NOTE — TELEPHONE ENCOUNTER
----- Message from Kaylan Faust sent at 4/4/2019  8:59 AM CDT -----  Contact: Mom- Dwaine- [phone]  Nnamdi- pts mom states the pt is having issues with his liver and his hematologist would like to speak with Dr. Coto- Dr. Mercy Escudero [phone]- please contact Dwaine [phone]

## 2019-04-11 ENCOUNTER — OUTSIDE PLACE OF SERVICE (OUTPATIENT)
Dept: ADMINISTRATIVE | Facility: OTHER | Age: 29
End: 2019-04-11
Payer: MEDICARE

## 2019-04-11 PROCEDURE — 49324 PR LAP INSERTION TUNNELED INTRAPERITONEAL CATHETER: ICD-10-PCS | Mod: ,,, | Performed by: SURGERY

## 2019-04-11 PROCEDURE — 49324 LAP INSERT TUNNEL IP CATH: CPT | Mod: ,,, | Performed by: SURGERY

## 2019-04-22 ENCOUNTER — OFFICE VISIT (OUTPATIENT)
Dept: SURGERY | Facility: CLINIC | Age: 29
End: 2019-04-22
Payer: MEDICARE

## 2019-04-22 DIAGNOSIS — Z98.890 POST-OPERATIVE STATE: Primary | ICD-10-CM

## 2019-04-22 PROCEDURE — 99024 PR POST-OP FOLLOW-UP VISIT: ICD-10-PCS | Mod: NTX,S$GLB,, | Performed by: SURGERY

## 2019-04-22 PROCEDURE — 99024 POSTOP FOLLOW-UP VISIT: CPT | Mod: NTX,S$GLB,, | Performed by: SURGERY

## 2019-04-22 NOTE — PROGRESS NOTES
HPI:  Postoperative revision status post Sundar peritoneal dialysis catheter insertion    PHYSICAL EXAM:  Abdomen soft nontender incisions healing well staples removed and Steri-Strips applied  ASSESSMENT:    Stable status post laparoscopic peritoneal dialysis catheter insertion  PLAN:  Will exteriorize when notified by dialysis unit

## 2019-04-25 DIAGNOSIS — Z76.82 ORGAN TRANSPLANT CANDIDATE: Primary | ICD-10-CM

## 2019-04-29 ENCOUNTER — TELEPHONE (OUTPATIENT)
Dept: TRANSPLANT | Facility: CLINIC | Age: 29
End: 2019-04-29

## 2019-06-11 PROBLEM — D72.829 LEUKOCYTOSIS: Status: ACTIVE | Noted: 2019-06-11

## 2019-06-11 PROBLEM — R52 PAIN: Status: ACTIVE | Noted: 2019-06-11

## 2019-06-11 PROBLEM — N18.9 ANEMIA SECONDARY TO RENAL FAILURE: Status: ACTIVE | Noted: 2019-06-11

## 2019-06-11 PROBLEM — D63.1 ANEMIA SECONDARY TO RENAL FAILURE: Status: ACTIVE | Noted: 2019-06-11

## 2019-06-11 PROBLEM — N28.9 KIDNEY DISEASE: Status: ACTIVE | Noted: 2019-06-11

## 2019-06-11 PROBLEM — K25.5 GASTRIC ULCER WITH PERFORATION: Status: ACTIVE | Noted: 2019-06-11

## 2019-06-11 PROBLEM — D50.9 IRON DEFICIENCY ANEMIA: Status: ACTIVE | Noted: 2019-06-11

## 2019-06-11 PROBLEM — E87.5 HYPERKALEMIA: Status: ACTIVE | Noted: 2019-06-11

## 2019-06-27 ENCOUNTER — HOSPITAL ENCOUNTER (OUTPATIENT)
Dept: RADIOLOGY | Facility: HOSPITAL | Age: 29
Discharge: HOME | End: 2019-06-27
Attending: NURSE PRACTITIONER
Payer: MEDICARE

## 2019-06-27 ENCOUNTER — OFFICE VISIT (OUTPATIENT)
Dept: TRANSPLANT | Facility: CLINIC | Age: 29
End: 2019-06-27
Payer: MEDICARE

## 2019-06-27 ENCOUNTER — CLINICAL SUPPORT (OUTPATIENT)
Dept: INFECTIOUS DISEASES | Facility: CLINIC | Age: 29
End: 2019-06-27
Payer: MEDICARE

## 2019-06-27 VITALS
TEMPERATURE: 98 F | SYSTOLIC BLOOD PRESSURE: 118 MMHG | BODY MASS INDEX: 24.74 KG/M2 | DIASTOLIC BLOOD PRESSURE: 75 MMHG | HEART RATE: 80 BPM | WEIGHT: 157.63 LBS | HEIGHT: 67 IN | RESPIRATION RATE: 18 BRPM | OXYGEN SATURATION: 98 %

## 2019-06-27 DIAGNOSIS — N28.9 KIDNEY DISEASE: ICD-10-CM

## 2019-06-27 DIAGNOSIS — N18.9 ANEMIA SECONDARY TO RENAL FAILURE: ICD-10-CM

## 2019-06-27 DIAGNOSIS — D57.1 SICKLE CELL DISEASE WITHOUT CRISIS: ICD-10-CM

## 2019-06-27 DIAGNOSIS — Z76.82 ORGAN TRANSPLANT CANDIDATE: ICD-10-CM

## 2019-06-27 DIAGNOSIS — Z86.73 HISTORY OF STROKE: ICD-10-CM

## 2019-06-27 DIAGNOSIS — D63.1 ANEMIA SECONDARY TO RENAL FAILURE: ICD-10-CM

## 2019-06-27 DIAGNOSIS — M1A.09X0 CHRONIC GOUT OF MULTIPLE SITES, UNSPECIFIED CAUSE: ICD-10-CM

## 2019-06-27 DIAGNOSIS — E83.111 IRON OVERLOAD DUE TO REPEATED RED BLOOD CELL TRANSFUSIONS: ICD-10-CM

## 2019-06-27 DIAGNOSIS — D72.829 LEUKOCYTOSIS, UNSPECIFIED TYPE: ICD-10-CM

## 2019-06-27 DIAGNOSIS — Z01.818 PRE-TRANSPLANT EVALUATION FOR CHRONIC KIDNEY DISEASE: Primary | ICD-10-CM

## 2019-06-27 PROCEDURE — 72170 XR PELVIS ROUTINE AP: ICD-10-PCS | Mod: 26,TXP,, | Performed by: RADIOLOGY

## 2019-06-27 PROCEDURE — G0009 HEPATITIS A VACCINE ADULT IM: ICD-10-PCS | Mod: S$GLB,TXP,, | Performed by: NURSE PRACTITIONER

## 2019-06-27 PROCEDURE — 90471 IMMUNIZATION ADMIN: CPT | Mod: S$GLB,TXP,, | Performed by: NURSE PRACTITIONER

## 2019-06-27 PROCEDURE — 3008F PR BODY MASS INDEX (BMI) DOCUMENTED: ICD-10-PCS | Mod: CPTII,S$GLB,TXP, | Performed by: NURSE PRACTITIONER

## 2019-06-27 PROCEDURE — 90471 PNEUMOCOCCAL CONJUGATE VACCINE 13-VALENT LESS THAN 5YO & GREATER THAN: ICD-10-PCS | Mod: S$GLB,TXP,, | Performed by: NURSE PRACTITIONER

## 2019-06-27 PROCEDURE — 99999 PR PBB SHADOW E&M-EST. PATIENT-LVL IV: ICD-10-PCS | Mod: PBBFAC,TXP,, | Performed by: NURSE PRACTITIONER

## 2019-06-27 PROCEDURE — 97802 PR MED NUTR THER, 1ST, INDIV, EA 15 MIN: ICD-10-PCS | Mod: S$GLB,TXP,, | Performed by: DIETITIAN, REGISTERED

## 2019-06-27 PROCEDURE — 99244 PR OFFICE CONSULTATION,LEVEL IV: ICD-10-PCS | Mod: S$GLB,TXP,, | Performed by: SURGERY

## 2019-06-27 PROCEDURE — 72170 X-RAY EXAM OF PELVIS: CPT | Mod: TC,TXP

## 2019-06-27 PROCEDURE — 90632 HEPATITIS A VACCINE ADULT IM: ICD-10-PCS | Mod: S$GLB,TXP,, | Performed by: NURSE PRACTITIONER

## 2019-06-27 PROCEDURE — 99205 OFFICE O/P NEW HI 60 MIN: CPT | Mod: S$GLB,TXP,, | Performed by: NURSE PRACTITIONER

## 2019-06-27 PROCEDURE — 99999 PR PBB SHADOW E&M-EST. PATIENT-LVL IV: CPT | Mod: PBBFAC,TXP,, | Performed by: NURSE PRACTITIONER

## 2019-06-27 PROCEDURE — 76770 US RETROPERITONEAL COMPLETE: ICD-10-PCS | Mod: 26,TXP,, | Performed by: RADIOLOGY

## 2019-06-27 PROCEDURE — 99244 OFF/OP CNSLTJ NEW/EST MOD 40: CPT | Mod: S$GLB,TXP,, | Performed by: SURGERY

## 2019-06-27 PROCEDURE — 71046 XR CHEST PA AND LATERAL: ICD-10-PCS | Mod: 26,TXP,, | Performed by: RADIOLOGY

## 2019-06-27 PROCEDURE — 97802 MEDICAL NUTRITION INDIV IN: CPT | Mod: S$GLB,TXP,, | Performed by: DIETITIAN, REGISTERED

## 2019-06-27 PROCEDURE — 90632 HEPA VACCINE ADULT IM: CPT | Mod: S$GLB,TXP,, | Performed by: NURSE PRACTITIONER

## 2019-06-27 PROCEDURE — 90670 PNEUMOCOCCAL CONJUGATE VACCINE 13-VALENT LESS THAN 5YO & GREATER THAN: ICD-10-PCS | Mod: S$GLB,TXP,, | Performed by: NURSE PRACTITIONER

## 2019-06-27 PROCEDURE — G0009 ADMIN PNEUMOCOCCAL VACCINE: HCPCS | Mod: S$GLB,TXP,, | Performed by: NURSE PRACTITIONER

## 2019-06-27 PROCEDURE — 99205 PR OFFICE/OUTPT VISIT, NEW, LEVL V, 60-74 MIN: ICD-10-PCS | Mod: S$GLB,TXP,, | Performed by: NURSE PRACTITIONER

## 2019-06-27 PROCEDURE — 71046 X-RAY EXAM CHEST 2 VIEWS: CPT | Mod: TC,TXP

## 2019-06-27 PROCEDURE — 3008F BODY MASS INDEX DOCD: CPT | Mod: CPTII,S$GLB,TXP, | Performed by: NURSE PRACTITIONER

## 2019-06-27 PROCEDURE — 76770 US EXAM ABDO BACK WALL COMP: CPT | Mod: TC,TXP

## 2019-06-27 PROCEDURE — 71046 X-RAY EXAM CHEST 2 VIEWS: CPT | Mod: 26,TXP,, | Performed by: RADIOLOGY

## 2019-06-27 PROCEDURE — 76770 US EXAM ABDO BACK WALL COMP: CPT | Mod: 26,TXP,, | Performed by: RADIOLOGY

## 2019-06-27 PROCEDURE — 90670 PCV13 VACCINE IM: CPT | Mod: S$GLB,TXP,, | Performed by: NURSE PRACTITIONER

## 2019-06-27 PROCEDURE — 99204 PR OFFICE/OUTPT VISIT, NEW, LEVL IV, 45-59 MIN: ICD-10-PCS | Mod: S$GLB,TXP,, | Performed by: NURSE PRACTITIONER

## 2019-06-27 PROCEDURE — 72170 X-RAY EXAM OF PELVIS: CPT | Mod: 26,TXP,, | Performed by: RADIOLOGY

## 2019-06-27 PROCEDURE — 99204 OFFICE O/P NEW MOD 45 MIN: CPT | Mod: S$GLB,TXP,, | Performed by: NURSE PRACTITIONER

## 2019-06-27 RX ORDER — HYDROCODONE BITARTRATE AND ACETAMINOPHEN 5; 325 MG/1; MG/1
1 TABLET ORAL EVERY 6 HOURS PRN
COMMUNITY

## 2019-06-27 RX ORDER — DEFERIPRONE 500 MG/1
500 TABLET ORAL 3 TIMES DAILY
COMMUNITY

## 2019-06-27 RX ORDER — FUROSEMIDE 40 MG/1
40 TABLET ORAL DAILY
COMMUNITY

## 2019-06-27 RX ORDER — CALCITRIOL 0.25 UG/1
0.25 CAPSULE ORAL
COMMUNITY
End: 2019-09-25

## 2019-06-27 NOTE — PROGRESS NOTES
Transplant Surgery  Kidney Transplant Recipient Evaluation    Referring Physician: Keaton Odom  Current Nephrologist: Keaton Odom    Subjective:     Reason for Visit: evaluate transplant candidacy    History of Present Illness: Ambrosio Newman is a 29 y.o. year old male undergoing transplant evaluation.    Dialysis History: Ambrosio is pre-dialysis.      Transplant History: N/A    Etiology of Renal Disease: Sickle Cell Anemia (based on medical records from referral).    Review of Systems   Constitutional: Positive for fatigue.   HENT: Negative for drooling, postnasal drip and sore throat.    Eyes: Negative for discharge and itching.   Respiratory: Negative for choking and stridor.    Gastrointestinal: Negative for rectal pain.   Endocrine: Negative for polydipsia.   Genitourinary: Negative for enuresis and genital sores.   Musculoskeletal: Negative for back pain, neck pain and neck stiffness.   Allergic/Immunologic: Negative for immunocompromised state.   Neurological: Negative for facial asymmetry and numbness.   Hematological: Negative for adenopathy.   Psychiatric/Behavioral: Negative for behavioral problems, self-injury and suicidal ideas.       Objective:     Physical Exam:  Constitutional:   Vitals reviewed: yes   Well-nourished and well-groomed: yes  Eyes:   Sclerae icteric: no   Extraocular movements intact: yes  GI:    Bowel sounds normal: yes   Tenderness: no    If yes, quadrant/location: not applicable   Palpable masses: no    If yes, quadrant/location: not applicable   Hepatosplenomegaly: no   Ascites: no   Hernia: no    If yes, type/location: not applicable   Surgical scars: yes    If yes, type/location: midline  Buried peritoneal catheter  Resp:   Effort normal: yes   Breath sounds normal: yes    CV:   Regular rate and rhythm: yes   Heart sounds normal: yes   Femoral pulses normal: yes   Extremities edematous: no  Skin:   Rashes or lesions present: no    If yes, describe:not applicable   Jaundice::  no    Musculoskeletal:   Gait normal: yes   Strength normal: yes  Psych:   Oriented to person, place, and time: yes   Affect and mood normal: yes    Additional comments: not applicable    Counseling: We provided Ambrosio Newman with a group education session today.  We discussed kidney transplantation at length with him, including risks, potential complications, and alternatives in the management of his renal failure.  The discussion included complications related to anesthesia, bleeding, infection, primary nonfunction, and ATN.  I discussed the typical postoperative course, length of hospitalization, the need for long-term immunosuppression, and the need for long-term routine follow-up.  I discussed living-donor and -donor transplantation and the relative advantages and disadvantages of each.  I also discussed average waiting times for both living donation and  donation.  I discussed national and center-specific survival rates.  I also mentioned the potential benefit of multicenter listing to candidates listed with centers within more than one organ procurement organization.  All questions were answered.    Final determination of transplant candidacy will be made once evaluation is complete and reviewed by the Kidney & Kidney/Pancreas Selection Committee.         Transplant Surgery - Candidacy   Assessment/Plan:   Ambrosio Newman is pre-dialysis with CKD stage 4 (GFR 15-29 mL/min). I see no surgical contraindication to placing a kidney transplant. Based on available information, Ambrosio Newman is a suitable kidney transplant candidate.     Maximiliano Reynaga MD

## 2019-06-27 NOTE — PROGRESS NOTES
TRANSPLANT NUTRITIONAL ASSESSMENT    Referring Provider: Minda Coleman NP    Reason for Visit: Pre-kidney transplant work-up (pre-dialysis)    Age: 29 y.o.  Sex: male    Patient Active Problem List   Diagnosis    Sickle cell disease without crisis    Iron overload due to repeated red blood cell transfusions    Chronic gout of multiple sites    Acute renal failure superimposed on stage 4 chronic kidney disease    Common bile duct stone    Choledocholithiasis    Gastric ulcer with perforation    Hyperkalemia    Iron deficiency anemia    Pain    Leukocytosis    Kidney disease    Anemia secondary to renal failure    History of stroke     Past Medical History:   Diagnosis Date    Allergy     Choledocholithiasis     Chronic gout of multiple sites 1/16/2019    CKD (chronic kidney disease), stage IV     CVA (cerebral vascular accident)     Encounter for blood transfusion     Gout     HTN (hypertension)     Iron overload     Iron overload due to repeated red blood cell transfusions 1/16/2019    Sickle cell anemia     Sickle cell disease without crisis 1/16/2019     Lab Results   Component Value Date    GLU 80 06/27/2019    K 5.1 06/27/2019    PHOS 5.3 (H) 06/27/2019    MG 1.6 01/21/2019    CHOL 87 (L) 06/27/2019    HDL 22 (L) 06/27/2019    TRIG 100 06/27/2019    ALBUMIN 3.2 (L) 06/27/2019    HGBA1C 4.9 01/17/2019    CALCIUM 8.6 (L) 06/27/2019     Other Pertinent Labs: None noted    Current Outpatient Medications   Medication Sig    calcitRIOL (ROCALTROL) 0.25 MCG Cap Take 0.25 mcg by mouth every Mon, Wed, Fri.    carvedilol (COREG) 12.5 MG tablet Take 12.5 mg by mouth once daily.     deferiprone 500 mg Tab Take 500 mg by mouth 3 (three) times daily.    folic acid (FOLVITE) 1 MG tablet Take 1 mg by mouth once daily.    furosemide (LASIX) 40 MG tablet Take 40 mg by mouth once daily.    HYDROcodone-acetaminophen (NORCO) 5-325 mg per tablet Take 1 tablet by mouth every 6 (six) hours as needed for  "Pain.     No current facility-administered medications for this visit.      Allergies: Bactrim [sulfamethoxazole-trimethoprim]    Ht Readings from Last 1 Encounters:   06/27/19 5' 7.32" (1.71 m)     Wt Readings from Last 1 Encounters:   06/27/19 71.5 kg (157 lb 10.1 oz)      BMI: Body mass index is 24.45 kg/m².    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 Overall poor nutrition quality of life. He reports eating a traditional southern diet. Drinks Dr. Pepper frequently. Instructed pt on eating more protein and drinking more water, as well as eating more fruits and vegetables. Pt provided the following diet recall:    Breakfast: Frozen breakfast bowl/croissant/pancake on a stick w/Dr. Pepper  Lunch: Okra and rice w/Dr. Pepper  Dinner: Roasted mashed potatoes and gravy, hot dogs, rice and gravy, Sohan[employer] chicken  Snacks: Oatmeal cakes, chips, cookies  Drinks: Occasional water and juice    Nutritional Diagnoses  Problem: undesirable food choices  Etiology: r/t lack of nutrition knowledge and not preparing his own meals  Symptoms: AEB diet recall    Educational Need? yes  Barriers: none identified  Discussed with: patient and mother  Interventions: Patient taught nutrition information regarding Pre-kidney transplant work-up (pre-dialysis).  Renal Nutrition Therapy packet reviewed (high/low food sources of K, Phos and protein, low " sodium and fluid intake, emphasis on moderate protein intake).  Goals/Recommendations: diet adherence, increase protein intake, choose healthy options when dining out, limit high potassium foods and limit intake of concentrated sweets  Actions Taken: instruct/provide written information  Strategies Used: problem solving, goal setting, motivational interviewing  Patient and/or family comprehend instructions: yes , adherence expected  Outcome: Verbalizes understanding  Monitoring: Contact information provided, will f/u in clinic and communicate with the care team as needed.     Counseling Time: 15 minutes    Reza Campbell Dietetic Intern    I certify that I directed the dietetic intern in service delivery and guided them using my skilled judgment. As the cosigning dietitian, I have reviewed the dietetic interns documentation and am responsible for the treatment, assessment, and plan.    Sharmaine Guevara RD, LDN

## 2019-06-27 NOTE — LETTER
June 27, 2019    Ambrosio Newman  [address]  [place] [address]             Dear Dr. Odom    Patient: Ambrosio Nweman   MR Number: [id number]   YOB: 1990     A battery of tests must be done to determine if you are suitable health to undergo a kidney transplant.  All of the recommended studies must be completed and received by the transplant team before you can be presented to the transplant selection committee.  The committee will then decide if you are suitable transplant candidate.  The following studies need to be obtained at home:        _X__Cardiac stress test: We request you to have a stress test to determine if you have any evidence of blockages in your heart.  We recommend a nuclear stress test or dobutamine stress echo.  ICD-10 code N19.    _X__2-D echo with color flow doppler: We need to look at the heart valves and heart muscle, and determine pulmonary artery pressures.  ICD-10 code N19.    _X_Cardiology consult: We are asking that your cardiologist clear you for transplant surgery and maximize your medical management.  We also need to note if there are special  management strategies that need to be used during your transplant event, especially since we routinely use IV fluids to help the new kidney function at its best.  Also, your heart doctor needs to know that the average wait for a kidney transplant can be as long as 3-5 years.  Therefore, we not only ask for a preoperative clearance, but also optimal management of your heart (for example: lipids, high blood pressure, heart failure, etc.).    You and your doctor should feel free to contact us at any time, if there are questions or concerns about these tests or the transplant evaluation process.    Sincerely,  Na Shah RN      [facility]  [address] [place] [id number]  [phone] office  [phone] fax

## 2019-06-27 NOTE — PROGRESS NOTES
PHARM.D. PRE-TRANSPLANT NOTE:    This patient's medication therapy was evaluated as part of his pre-transplpant evaluation.      The following general pharmacologic concerns were noted: patient takes pain meds PRN (has hx of sickle cell- may have higher pain requirements post op).  Patient also with a sulfa/bactrim allergy - will need alternate PCP prophy post transplant    The following pharmacologic concerns related to HCV therapy were noted: none      This patient's medication profile was reviewed for contraindications for DAA Hepatitis C therapy:    [x]  No current inducers of CYP 3A4 or PGP  [x]  No amiodarone on this patient's EMR profile in the last 24 months  [x]  No past or current atrial fibrillation on this patient's EMR profile       Current Outpatient Medications   Medication Sig Dispense Refill    calcitRIOL (ROCALTROL) 0.25 MCG Cap Take 0.25 mcg by mouth every Mon, Wed, Fri.      carvedilol (COREG) 12.5 MG tablet Take 12.5 mg by mouth once daily.       deferiprone 500 mg Tab Take 500 mg by mouth 3 (three) times daily.      folic acid (FOLVITE) 1 MG tablet Take 1 mg by mouth once daily.      furosemide (LASIX) 40 MG tablet Take 40 mg by mouth once daily.      HYDROcodone-acetaminophen (NORCO) 5-325 mg per tablet Take 1 tablet by mouth every 6 (six) hours as needed for Pain.       No current facility-administered medications for this visit.          Currently Mr Newman is responsible for preparing / administering this patient's medications on a daily basis.  I am available for consultation and can be contacted, as needed by the other members of the Kidney Transplant team.

## 2019-06-27 NOTE — PROGRESS NOTES
Transplant Nephrology  Kidney Transplant Recipient Evaluation    Referring Physician: Keaton Odom  Current Nephrologist: Keaton Odom    Subjective:   CC:  Initial evaluation of kidney transplant candidacy.    HPI:  Mr. Newman is a 29 y.o. year old Black or  male who has presented to be evaluated as a potential kidney transplant recipient.  He has advanced kidney disease secondary to sickle cell anemia.  Patient is currently pre-dialysis. He has a PD catheter for dialysis access.     Previous Transplant: no      Sickle cell anemia  Reports blood  Transfusions ~ every 4-5 weeks  HX CVA 2 8 years old   Residual -->LUE Weakness  Elevated WBCs from at least 1/2019 per Epic-Pt and his MOM states he has always had elevated WBCs  He follows with Dr GABY Posada in [place] [facility]    Cholecystectomy 12/2018  Biliary stone blocking duct in 1/2019--pt reports has resolved        Donors--no    Reports having new onset of abdominal pain RLQ . Described as shooting, intermittent, Pain rated 6/10.  Will take a pain pill for relief. Currently has PD cath in place. He is scheduled to see nephrologist on 7/1     Currently works at a [employer] store , part time.   Denies SOB, chest pain or leg pain with exertion.       Past Medical and Surgical History: Mr. Newman  has a past medical history of Allergy, Choledocholithiasis, Chronic gout of multiple sites, CKD (chronic kidney disease), stage IV, CVA (cerebral vascular accident), Encounter for blood transfusion, Gout, HTN (hypertension), Iron overload, Iron overload due to repeated red blood cell transfusions, Sickle cell anemia, and Sickle cell disease without crisis.  He has a past surgical history that includes Tonsillectomy; Cholecystectomy; ruptured gastric ulcer with amy patch; Endoscopic ultrasound of upper gastrointestinal tract (N/A, 1/17/2019); ERCP (N/A, 1/17/2019); ERCP (N/A, 3/21/2019); and Tunneled venous port placement.    Past Social and Family  History: Mr. Newman reports that he has never smoked. He has never used smokeless tobacco. He reports that he does not drink alcohol or use drugs. His family history includes Diabetes in his maternal grandmother, mother, and sister; Drug abuse in his father; Heart disease in his maternal grandfather.      Past Medical History:   Diagnosis Date    Allergy     Choledocholithiasis     Chronic gout of multiple sites 1/16/2019    CKD (chronic kidney disease), stage IV     CVA (cerebral vascular accident)     Encounter for blood transfusion     Gout     HTN (hypertension)     Iron overload     Iron overload due to repeated red blood cell transfusions 1/16/2019    Sickle cell anemia     Sickle cell disease without crisis 1/16/2019       Review of Systems   Constitutional: Positive for fatigue. Negative for activity change, appetite change, chills, fever and unexpected weight change.   HENT: Negative for congestion, facial swelling, postnasal drip, rhinorrhea, sinus pressure, sore throat and trouble swallowing.    Eyes: Positive for visual disturbance. Negative for pain and redness.   Respiratory: Negative for cough, chest tightness, shortness of breath and wheezing.    Cardiovascular: Negative.  Negative for chest pain, palpitations and leg swelling.   Gastrointestinal: Positive for abdominal pain. Negative for diarrhea, nausea and vomiting.   Genitourinary: Negative for dysuria, flank pain and urgency.   Musculoskeletal: Positive for arthralgias and back pain. Negative for gait problem, neck pain and neck stiffness.   Skin: Negative for rash.        acne   Allergic/Immunologic: Negative for environmental allergies, food allergies and immunocompromised state.   Neurological: Positive for weakness. Negative for dizziness, light-headedness and headaches.        Left UE weakness, HX CVA    Psychiatric/Behavioral: Positive for sleep disturbance. Negative for agitation and confusion. The patient is not  "nervous/anxious.        Objective:   Blood pressure 118/75, pulse 80, temperature 98.4 °F (36.9 °C), temperature source Oral, resp. rate 18, height 5' 7.32" (1.71 m), weight 71.5 kg (157 lb 10.1 oz), SpO2 98 %.body mass index is 24.45 kg/m².    Physical Exam   Constitutional: He is oriented to person, place, and time. He appears well-developed and well-nourished.   HENT:   Head: Normocephalic.   Mouth/Throat: Oropharynx is clear and moist. No oropharyngeal exudate.   Eyes: Pupils are equal, round, and reactive to light. Conjunctivae and EOM are normal. No scleral icterus.   Neck: Normal range of motion. Neck supple.   Cardiovascular: Normal rate, regular rhythm and normal heart sounds.   Pulmonary/Chest: Effort normal and breath sounds normal.   Abdominal: Soft. Normal appearance and bowel sounds are normal. He exhibits no distension and no mass. There is no splenomegaly or hepatomegaly. There is no tenderness. There is no rebound, no guarding, no CVA tenderness, no tenderness at McBurney's point and negative Minor's sign.       Healed scars   Musculoskeletal: Normal range of motion. He exhibits no edema.   Lymphadenopathy:     He has no cervical adenopathy.   Neurological: He is alert and oriented to person, place, and time. He exhibits normal muscle tone. Coordination normal.   Skin: Skin is warm and dry.   Psychiatric: He has a normal mood and affect. His behavior is normal.   Vitals reviewed.      Labs:  Lab Results   Component Value Date    WBC 21.59 (H) 06/27/2019    HGB 8.2 (L) 06/27/2019    HCT 24.5 (L) 06/27/2019     (L) 06/27/2019    K 5.1 06/27/2019     06/27/2019    CO2 15 (L) 06/27/2019    BUN 99 (H) 06/27/2019    CREATININE 5.4 (H) 06/27/2019    EGFRNONAA 13.2 (A) 06/27/2019    CALCIUM 8.6 (L) 06/27/2019    PHOS 5.3 (H) 06/27/2019    MG 1.6 01/21/2019    ALBUMIN 3.2 (L) 06/27/2019    AST 24 06/27/2019    ALT 17 06/27/2019    .0 (H) 06/27/2019       Lab Results   Component Value Date "    LIPASE 57 01/17/2019       No results found for: HLAABCTYPE    Labs were reviewed with the patient.    Assessment:     1. Pre-transplant evaluation for chronic kidney disease    2. Sickle cell disease without crisis    3. Kidney disease    4. Chronic gout of multiple sites, unspecified cause    5. Anemia secondary to renal failure    6. History of stroke    7. Leukocytosis, unspecified type    8. Iron overload due to repeated red blood cell transfusions        Plan:   hemonc clearance--sickle cell, leukocytosis     Gen nephrologist--address abdominal pain r/o infection     Transplant Candidacy:   Based on available information, Mr. Newman is a high-risk kidney transplant candidate.   Meets center eligibility for accepting HCV+ donor offer - yes.  Patient educated on HCV+ donors. Ambrosio is willing to accept HCV+ donor offer - yes   Patient is a candidate for KDPI > 85 kidney donor offer - no d/t age.  Final determination of transplant candidacy will be made once workup is complete and reviewed by the selection committee.    Minda Coleman, [place]       UNOS Patient Status  Functional Status: 80% - Normal activity with effort: some symptoms of disease  Physical Capacity: No Limitations

## 2019-06-27 NOTE — LETTER
June 30, 2019        Keaton Odom  [address]  [place] [address]  Phone: [phone]  Fax: [phone]             [facility]  [address]  [place] [address]  Phone: [phone]   Patient: Ambrosio Newman   MR Number: [id number]   YOB: 1990   Date of Visit: 6/27/2019       Dear Dr. Keaton Odom    Thank you for referring Ambrosio Newman to me for evaluation. Attached you will find relevant portions of my assessment and plan of care.    If you have questions, please do not hesitate to call me. I look forward to following Ambrosio Newman along with you.    Sincerely,    Minda Coleman, NP    Enclosure    If you would like to receive this communication electronically, please contact [email or web address] or [phone] to request DescribeMe Link access.    DescribeMe Link is a tool which provides read-only access to select patient information with whom you have a relationship. Its easy to use and provides real time access to review your patients record including encounter summaries, notes, results, and demographic information.    If you feel you have received this communication in error or would no longer like to receive these types of communications, please e-mail [email or web address]

## 2019-06-27 NOTE — PROGRESS NOTES
"Pre Transplant Infectious Diseases Consult  Kidney Transplant Recipient Evaluation    Requesting Physician: Minda Coleman    Reason for Visit:  Pre Transplant Evaluation.  Currently pre-dialysis - has buried PD catheter for future PD.        Patient has sickle cell anemia.  Hospitalized in January with gallstone pancreatitis - biliary stent placed by ERCP and treated with IV zosyn.  Repeat ERCP in March and stent removed.  No problems since that time    History of elevated WBC ("as long as he can remember") - follows with hematology in [place]    Organ:  Kidney    Etiology of Kidney Disease:  Sickle cell diseae    History of Prior Transplant:  No    Currently taking immunosuppressants/steroids:  No    History of Splenectomy:  No.  Sickle cell disease - functional asplenia    Infectious History:  Current/recent infections or currently taking antibiotics?  No  History of recurrent infections (sinuses, throat, bladder/kidneys, intestines, skin, dental, lung, catheter (HD/PD) related, or peritonitis/SBP)?  No  Any major hospitalizations due to infection?  Yes - See Above.  Gallstone pancreatitis   History of Diabetes/diabetic foot infection/osteomyelitis?  No  History of shingles? No   History of STDs (syphilis, viral hepatitis, HIV)?  No  Exposure to TB or ever had a positive TB skin test?  No  History of residence in coccidioides endemic areas ([facility]?  No  Any foreign travel?  No  Any associated illness?  N/A    Social/Environmental:  Occupational:  Janitorial, stocking   Animal exposures (dogs, cats, farm animals, bird cages, fish tanks):  Yes - One dog, fully vaccinated.    Hobbies (gardening, hike, fish/hunting, etc): Indoor activities  Consumption of raw/undercooked meat or seafood?  No  Any injectable or smoked recreational drug use?  No    Immunization History:  Childhood vaccines:  Yes  Last Flu shot: denies   Tetanus/TDAP:  2003  Hepatitis A:none  Hepatitis B: 3 doses 2005  Prevnar-13: PCV 7 " 1993  Pneumovax-23: 2007  Shingles (Zostavax/Shingrix): denies  Meningococcal: 1993, 2007 - Menactra  Other: HIB 1991, MMR two doses    Serologies:  Hep B Core Total Ab   Date Value Ref Range Status   01/19/2019 Negative  Final     Hep B S Ab   Date Value Ref Range Status   01/19/2019 Positive (A)  Final     Hepatitis B Surface Ag   Date Value Ref Range Status   01/19/2019 Negative  Final        Review of Systems   Constitution: Positive for decreased appetite and malaise/fatigue. Negative for chills, fever, night sweats, weight gain and weight loss.   HENT: Negative for congestion, ear pain, hearing loss, hoarse voice, sore throat and tinnitus.    Eyes: Negative for blurred vision, redness and visual disturbance.   Cardiovascular: Negative for chest pain, leg swelling and palpitations.   Respiratory: Negative for cough, hemoptysis, shortness of breath, sputum production and wheezing.    Endocrine: Negative for cold intolerance and heat intolerance.   Hematologic/Lymphatic: Negative for adenopathy. Does not bruise/bleed easily.   Skin: Positive for itching. Negative for dry skin, rash and suspicious lesions.   Musculoskeletal: Positive for back pain (low back pain ), joint pain and myalgias. Negative for neck pain.   Gastrointestinal: Positive for abdominal pain (intermittent sharp pains - right side and groin ). Negative for constipation, diarrhea, heartburn, nausea and vomiting.   Genitourinary: Positive for dysuria (ocassional ). Negative for flank pain, frequency, hematuria, hesitancy and urgency.   Neurological: Positive for dizziness (occasional ). Negative for headaches, numbness, paresthesias and weakness.   Psychiatric/Behavioral: Negative for depression and memory loss. The patient does not have insomnia and is not nervous/anxious.    Allergic/Immunologic: Negative for environmental allergies, HIV exposure, hives and persistent infections.     Physical Exam   Constitutional: He is oriented to person,  place, and time. He appears well-developed and well-nourished. No distress.       HENT:   Head: Normocephalic and atraumatic.   Mouth/Throat: Uvula is midline, oropharynx is clear and moist and mucous membranes are normal. He does not have dentures. No oral lesions. Normal dentition. No dental abscesses, lacerations or dental caries. No oropharyngeal exudate.   Eyes: Conjunctivae and lids are normal. Left eye exhibits no discharge. No scleral icterus.   Neck: Neck supple.   Cardiovascular: Normal rate and regular rhythm. Exam reveals no gallop and no friction rub.   No murmur heard.  Pulmonary/Chest: Effort normal and breath sounds normal. No respiratory distress. He has no decreased breath sounds. He has no wheezes. He has no rhonchi. He has no rales.   Abdominal: Soft. Normal appearance, normal aorta and bowel sounds are normal. He exhibits no distension. There is no tenderness. There is no guarding.   Musculoskeletal: He exhibits no edema.   Lymphadenopathy:        Head (right side): No submental, no submandibular, no tonsillar, no preauricular, no posterior auricular and no occipital adenopathy present.        Head (left side): No submental, no submandibular, no tonsillar, no preauricular, no posterior auricular and no occipital adenopathy present.     He has no cervical adenopathy.     He has no axillary adenopathy.        Right: No inguinal, no supraclavicular and no epitrochlear adenopathy present.        Left: No inguinal, no supraclavicular and no epitrochlear adenopathy present.   Neurological: He is alert and oriented to person, place, and time. No cranial nerve deficit.   Skin: Skin is warm, dry and intact. No lesion and no rash noted. He is not diaphoretic. No erythema. No pallor.   Acne     Psychiatric: He has a normal mood and affect. His behavior is normal.   Vitals reviewed.           Counseling:   I discussed with the patient the risk for increased susceptibility to infections following  transplantation including increased risk for infection right after transplant and if rejection should occur.  The patient has been counseled on the importance of vaccinations including but not limited to a yearly flu vaccine. Patient was also instructed to encourage that family/caretakers receive their flu vaccine yearly.    Specific guidance has been provided to the patient regarding the patient's occupation, hobbies and activities to avoid future infectious complications. These include but are not limited to: avoiding raw/undercooked meats and seafood, avoiding unpasteurized milk/cheeses, proper (hand) hygiene, contact with animals and appropriate vaccination of animals, use of mosquito/tick precautions, avoiding walking barefoot, avoiding sick contacts, and seeking medical advice prior to foreign travel (specifically developing countries).     Transplant Candidacy: Based on available information, there are no identified significant barriers to transplantation from an infectious disease standpoint pending acceptable serologies and subject to recommendations below.     Final determination of transplant candidacy will be made once evaluation is complete and reviewed by the Transplant Selection Committee.      ID recommendations:     1.  Vaccines today:  Prevnar (PCV13), Hepatitis A, Tdap.  Will need PPSV 23, Menactra booster, and first Bexsero (Meningitis B) in 8 weeks.  2nd  Bexsero 4 weeks after the first.  Rx given for future vaccines as patient lives out of town.  Recommend PPSV23 and Menactra booster every 5 years.  2.  Quantiferon Gold is pending.  If positive, please consult ID. If  Indeterminate, please draw T spot.  If T spot positive, please consult ID.    3.  RPR, strongyloides, HIV pending.  If positive, please consult ID  4.  Varicella IgG pending.  Please consult ID for vaccine recommendations if negative.   5.  Follow up with Hematology re: leukocytosis   6.  Recommend Nephrology/GI follow up  regarding complaints of intermittent abdominal pain

## 2019-06-28 NOTE — PROGRESS NOTES
Transplant Recipient Adult Psychosocial Assessment    Ambrosionicholas Valentinesusannah  [address]  [place] [address]  Telephone Information:   Mobile [phone]   Home  [phone] (home)  Work  There is no work phone number on file.  E-mail  No e-mail address on record    Sex: male  YOB: 1990  Age: 29 y.o.    Encounter Date: 6/27/2019  [place] Citizen: yes  Primary Language: English   Needed: no    Emergency Contact:  Name: Dwaine Pitts   Relationship: mother  Address: same as pt   Phone Numbers:  [phone] (home), [phone] (work), [phone] (mobile)    Family/Social Support:   Number of dependents/: pt reports no minor dependents   Marital history: pt reports no marital history   Other family dynamics: pt presents with supportive mother. Pt resides with mother and supportive step-father Azeem. Pt also reports newly found step siblings and are trying to build a relationship.     Household Composition:  Name: Diane Pitts   Age: 49 and 57  Relationship: mother and step-father   Does person drive? yes      Do you and your caregivers have access to reliable transportation? yes  PRIMARY CAREGIVER: Donavan Pitts will be primary caregiver, phone number [phone].      provided in-depth information to patient and caregiver regarding pre- and post-transplant caregiver role.   strongly encourages patient and caregiver to have concrete plan regarding post-transplant care giving, including back-up caregiver(s) to ensure care giving needs are met as needed.    Patient and Caregiver states understanding all aspects of caregiver role/commitment and is able/willing/committed to being caregiver to the fullest extent necessary.    Patient and Caregiver verbalizes understanding of the education provided today and caregiver responsibilities.         remains available. Patient and Caregiver agree to contact  in a timely  manner if concerns arise.      Able to take time off work without financial concerns: yes.     Additional Significant Others who will Assist with Transplant:  Name: Azeem Pitts   Age: 57  Number: [phone]  City: [place]  State: [place]  Relationship: step-father   Does person drive? yes      Living Will: no  Healthcare Power of : no pt reports trusting mother with medical decisions   Advance Directives on file: <<no information> per medical record.  Verbally reviewed LW/HCPA information.   provided patient with copy of LW/HCPA documents and provided education on completion of forms.    Living Donors: Yes.  Name: pt's mother reports pt's aunt is interested . Education and resource information given to patient.    Highest Education Level: High School (9-12) or GED  Reading Ability: 12th grade  Reports difficulty with: reading, comprehension and memory Pt's mother reports pt has always had issues with comprehension and memory. Pt's mother reports pt can be a poor historian regarding medical care. Pt denies but could not provide much history without mother's contribution. Pt's mother assist with medication and appointment management.   Learns Best By:  Hands-on instruction      Status: no  VA Benefits: no     Working for Income: yes  If yes, working activity level: Working Part Time Due to Demands of Treatment  Patient is employed as an associate at [id number] ..    Spouse/Significant Other Employment: Pt denies     Disabled: yes: date disability began: 1990, due to: sickle cell .    Monthly Income:  Salary/Wages: $250  SS Disability: $1000  Able to afford all costs now and if transplanted, including medications: yes  Patient and Caregiver verbalizes understanding of personal responsibilities related to transplant costs and the importance of having a financial plan to ensure that patients transplant costs are fully covered.       provided fundraising  information/education. Patient and Caregiververbalizes understanding.   remains available.    Insurance:   Payor/Plan Subscr  Sex Relation Sub. Ins. ID Effective Group Num   1. HUMANA MANAGE* VASQUEZ HUNTER* 1990 Male  [id number] 1/1/18 [id number]                                   [address] [address]   2. [place]* VASQUEZ HUNTER* 1990 Male  [id number]* [id number]                                    [address] [address]     Primary Insurance (for UNOS reporting): Public Insurance - Medicare FFS (Fee For Service)  Secondary Insurance (for UNOS reporting): Public Insurance - Medicaid  Patient and Caregiver verbalizes clear understanding that patient may experience difficulty obtaining and/or be denied insurance coverage post-surgery. This includes and is not limited to disability insurance, life insurance, health insurance, burial insurance, long term care insurance, and other insurances.      Patient and Caregiver also reports understanding that future health concerns related to or unrelated to transplantation may not be covered by patient's insurance.  Resources and information provided and reviewed.     Patient and Caregiver provides verbal permission to release any necessary information to outside resources for patient care and discharge planning.  Resources and information provided are reviewed.      Dialysis Adherence: Patient reports as pre-dialysis but desires to utilize PD treatments when approved to start dialysis. Pt will be meeting with nephrologist soon to determine start date.    Infusion Service: patient utilizing? no  Home Health: patient utilizing? yes nursing visits as a requirement for insurance. RN checks vitals 2x monthly.   DME: yes wheelchair,due to gout but does not currently utilize   Pulmonary/Cardiac Rehab: pt denies    ADLS:  Pt reports pt is independent with all ADLS including driving, bathing,walking,taking medications, cooking, housekeeping, eating, and shopping.  "    Adherence:  .  Adherence education and counseling provided.     Per History Section:  Past Medical History:   Diagnosis Date    Allergy     Choledocholithiasis     Chronic gout of multiple sites 1/16/2019    CKD (chronic kidney disease), stage IV     CVA (cerebral vascular accident)     Encounter for blood transfusion     Gout     HTN (hypertension)     Iron overload     Iron overload due to repeated red blood cell transfusions 1/16/2019    Sickle cell anemia     Sickle cell disease without crisis 1/16/2019     Social History     Tobacco Use    Smoking status: Never Smoker    Smokeless tobacco: Never Used   Substance Use Topics    Alcohol use: No     Frequency: Never     Social History     Substance and Sexual Activity   Drug Use No     Social History     Substance and Sexual Activity   Sexual Activity Not Currently    Partners: Male       Per Today's Psychosocial:  Tobacco: none, patient denies any use.  Alcohol: none, patient denies any use.  Illicit Drugs/Non-prescribed Medications: none, patient denies any use.    Patient and Caregiver states clear understanding of the potential impact of substance use as it relates to transplant candidacy and is aware of possible random substance screening.  Substance abstinence/cessation counseling, education and resources provided and reviewed.     Arrests/DWI/Treatment/Rehab: patient denies    Psychiatric History:    Mental Health: Pt denies history of anxiety, depression and or overwhelming feelings of sadness at this time or in the past. Pt's mother reports pt has always been very positive and pt agreed. Pt's mother reports "he has always tried to be very independent".   Psychiatrist/Counselor: Pt denies currently or in the past and reports willingness to meet with psychiatry if required by transplant.   Medications:  Pt denies utilizing mental health medications currently or in the past  Suicide/Homicide Issues: Pt denies feelings of wanting to harm " self or other currently or in the past  Safety at home: Pt reports feeling safe at home.     Knowledge: Patient and Caregiver states having clear understanding and realistic expectations regarding the potential risks and potential benefits of organ transplantation and organ donation and agrees to discuss with health care team members and support system members, as well as to utilize available resources and express questions and/or concerns in order to further facilitate the pt informed decision-making.  Resources and information provided and reviewed.    Patient and Caregiver is aware of [facility]'s affiliation and/or partnership with agencies in home health care, LTAC, SNF, [facility], [place], and other hospitals and clinics.    Understanding: Patient and Caregiver reports having a clear understanding of the many lifetime commitments involved with being a transplant recipient, including costs, compliance, medications, lab work, procedures, appointments, concrete and financial planning, preparedness, timely and appropriate communication of concerns, abstinence (ETOH, tobacco, illicit non-prescribed drugs), adherence to all health care team recommendations, support system and caregiver involvement, appropriate and timely resource utilization and follow-through, mental health counseling as needed/recommended, and patient and caregiver responsibilities.  Social Service Handbook, resources and detailed educational information provided and reviewed.  Educational information provided.    Patient and Caregiver also reports current and expected compliance with health care regime and states having a clear understanding of the importance of compliance.      Patient and Caregiver reports a clear understanding that risks and benefits may be involved with organ transplantation and with organ donation.       Patient and Caregiver also reports clear understanding that psychosocial risk factors may affect patient, and include but are not limited to  feelings of depression, generalized anxiety, anxiety regarding dependence on others, post traumatic stress disorder, feelings of guilt and other emotional and/or mental concerns, and/or exacerbation of existing mental health concerns.  Detailed resources provided and discussed.      Patient and Caregiver agrees to access appropriate resources in a timely manner as needed and/or as recommended, and to communicate concerns appropriately.  Patient and Caregiver also reports a clear understanding of treatment options available.     Patient and Caregiver received education in a group setting.   reviewed education, provided additional information, and answered questions.    Feelings or Concerns: Pt denies having any concerns and or overwhelming feelings regarding transplant at this time.     Coping: Pt reports coping adequately with transplant work up. Pt reports close relationships with mother and aunt.     Goals: Pt could not report any post transplant goals at this time.     Interview Behavior: Patient and Caregiver presents as alert and oriented x 4, pleasant, good eye contact, well groomed, recall good, concentration/judgement good, average intelligence, calm, communicative, cooperative and asking and answering questions appropriately. Per pt request, pt's mother was present during assessment. Pt's mother was highly engaged and motivated for transplant.          Transplant Social Work - Candidacy  Assessment/Plan:     Psychosocial Suitability: Patient presents as a suitable candidate for transplant at this time. Based on psychosocial risk factors, patient presents as low risk, due to absence of overwhelming psychsocial issues. However, pt will need long term care assistance due to difficuly comprehending medical information..    Recommendations/Additional Comments: JUANCARLOS recommends that pt conduct fundraising to assist pt with pay for cost of medications, food, gas, and other transplant related needs. JUANCARLOS  recommends that pt remain aware of potential mental health concerns and contact the team if any concerns arise. SW recommends that pt remain abstinent from tobacco, ETOH, and drug use. SW supports pt's continued dialysis adherence. SW remains available to answer any questions or concerns that arise as the pt moves through the transplant process.       Alexa Shah, SHER, LMSW

## 2019-07-20 ENCOUNTER — TELEPHONE (OUTPATIENT)
Dept: INFECTIOUS DISEASES | Facility: HOSPITAL | Age: 29
End: 2019-07-20

## 2019-07-20 DIAGNOSIS — Z76.82 ORGAN TRANSPLANT CANDIDATE: Primary | ICD-10-CM

## 2019-07-20 DIAGNOSIS — B78.9 STRONGYLOIDIASIS: ICD-10-CM

## 2019-07-20 RX ORDER — IVERMECTIN 3 MG/1
200 TABLET ORAL DAILY
Qty: 10 TABLET | Refills: 0 | Status: SHIPPED | OUTPATIENT
Start: 2019-07-20 | End: 2019-07-22

## 2019-07-20 NOTE — TELEPHONE ENCOUNTER
Pre-kidney transplant with positive strongy.   RX ivermectin 200mcg/kg X 2 consecutive days  Medication list reviewed for interactions - no significant interactions noted.   Discussed with patient and his mother.  Discussed possible side effects. Take on empty stomach with full glass of water.    Gave my contact information.  Call with any problems

## 2019-07-20 NOTE — TELEPHONE ENCOUNTER
----- Message from Na Shah sent at 7/17/2019 10:21 AM CDT -----  Trever,  This patient was seen by you in [facility] on 6/27/19. His Strongyloides Ab IgG is Positive. Please advise, does he need to come back here or can you send over a RX?    Na

## 2019-08-09 ENCOUNTER — TELEPHONE (OUTPATIENT)
Dept: TRANSPLANT | Facility: CLINIC | Age: 29
End: 2019-08-09

## 2019-08-09 NOTE — TELEPHONE ENCOUNTER
----- Message from Isa Smith sent at 8/9/2019  9:13 AM CDT -----  Contact: pt mother/Dwaine  Please call pt mother at [phone]    Patient mother has concerns and questions about his future appts    Thank you

## 2019-08-12 ENCOUNTER — TELEPHONE (OUTPATIENT)
Dept: TRANSPLANT | Facility: CLINIC | Age: 29
End: 2019-08-12

## 2019-08-12 NOTE — TELEPHONE ENCOUNTER
----- Message from Na Shah sent at 8/9/2019  3:33 PM CDT -----  Contact: PTs Mother      ----- Message -----  From: Kelsey Moreland  Sent: 8/9/2019  11:01 AM  To: [facility] Pre-Kidney Transplant Clinical    Mother called back regarding missed call from Chantal.  Reason: Concerns about future appointments.       Callback: [phone]

## 2019-09-05 ENCOUNTER — TELEPHONE (OUTPATIENT)
Dept: TRANSPLANT | Facility: CLINIC | Age: 29
End: 2019-09-05

## 2019-09-05 NOTE — TELEPHONE ENCOUNTER
According to nephrologist medical records, in the last three months. Pt has been HIGHLY compliant with appointments and labs are suitable.       Confirmed by Nephrology-ELVER MirandaLY-[phone]

## 2019-09-06 ENCOUNTER — COMMITTEE REVIEW (OUTPATIENT)
Dept: TRANSPLANT | Facility: CLINIC | Age: 29
End: 2019-09-06

## 2019-09-06 DIAGNOSIS — Z76.82 ORGAN TRANSPLANT CANDIDATE: Primary | ICD-10-CM

## 2019-09-06 NOTE — COMMITTEE REVIEW
"Native Organ Dx: Sickle Cell Anemia      Unable to determine transplant candidacy at this time due to multiple hematological  issues including cause for leukocytosis, why not on hydroxyurea, hemoglobin threshold for blood transfusions and why on prednisone 20 mg daily. Also need to clarify if patent is still have gastric ulcers. Patient needs second opinion from an [facility] Hematologist for help with post transplant management if needed. Patient at risk for renal artery thrombosis due to  Abnormal Protein C     Committee decision discussed with patient's mother per patient request. She stated that patient:  No longer taking Prednisone, he was taking it for a Gout flare.  Not taking hydroxyurea due to intolerance "pt nails were turning black" currently taking Endari  No longer taking Exjade but taking Ferriprox  Stated that pt hasn't had a crisis in a couple of years due to scheduled transfusions. Pt gets week lab draws and transfusion depends on results.  Pt doesn't have gastric ulcers seen 2-3 years age. She stated that patient had surgery to because "he had a hold in his stomach"    She was informed that patient will need to come here for Hem. All questions were answered and she verbalized understanding.     Up to date Hematology (Dr. Posada) note requested and records from [facility] regarding EGD.     Note written by Na Shah RN    ===============================================    I was present at the meeting and attest to the decision of the committee.    Junior Bravo  09/06/2019  "

## 2019-09-06 NOTE — LETTER
September 6, 2019    Ambrosio Newman  [address]  [place] [address]    Dear Ambrosio Newman:  MRN: [id number]    Your transplant evaluation was reviewed at the [facility] Selection [facility] meeting on 9/6/2019.  It is with regret I inform you that your workup is incomplete and we are unable to determine your transplant candidacy at this time due multiple hematological  Issues that's requiring clarification.  You will be re-presented to the selection committee once pending studies are completed.  You will be notified of the committees decision once we review the new results.    The [facility] Selection Committee carefully considers each patients transplant candidacy using established selection criteria to determine if it is safe to proceed with transplantation for each and every person evaluated.  Although the selection committee believes your workup is incomplete and we are unable to determine your transplant candidacy, you have the right to be evaluated at other transplant centers.  You may request your [facility] records be sent to any center of your choice by contacting our Medical Records Department at [phone].    Attached is a letter from the [employer] for Organ Sharing (UNOS).  It describes the services and information offered to patients by UNOS and the Organ Procurement and Transplant Network.    Sincerely,      Dorothy Cristina MD  Medical Director, Kidney & Kidney/Pancreas Transplantation  lh  Faxed to: Keaton Odom MD    Encl: UNOS Letter          OPTN/UNOS: Your Resource for Organ Transplant Information        If you have a question regarding your own medical care, you always should call your transplant center first. However, for general organ transplant-related information, you can call the [employer] for Organ Sharing (UNOS) toll-free patient services line at 1-863.145.6760.    Anyone, including potential transplant candidates, recipients, family  members/friends, living donors, and/or donor family members can call this number to:    · talk about organ donation, living donation, how transplant and donation work, the donation process, transplant policies, and transplant/donor information;  · get a free patient information kit with helpful booklets, waiting list and transplant information, and a list of all transplant centers;  · ask questions about the Organ Procurement and Transplantation Network (OPTN) web site (www.optn.transplant.hrsa.gov); the UNOS Web site (www.unos.org); or the UNOS web site for living donors and transplant recipients (www.transplantliving.org);  · learn how UNOS and the OPTN can help you;  · talk about any concerns that you may have with a transplant center and how they perform    UNOS is a not-for-profit organization that provides all of the administrative services for the national OPTN under federal contract to the Health Resources and Services Administration (HRSA), an agency under the U.S. Department of Health and Human Services (HHS).     UNOS and OPTN responsibilities include:    · writing educational material for patients, the public and professionals;  · helping to make people aware of the need for donated organs and tissue;  · writing organ transplant policy with help from doctors, nurses, transplant patients/candidates, donor families, living donors, and the public;  · coordinating the organ matching and placement process;  · collecting information about every organ transplant and donation that occurs in the [place].    Remember, you should contact your transplant center directly if you have questions or concerns about your own medical care including medical records, work-up progress and test reports. [facility] is not your transplant center, and staff at [facility] will not be able to transfer you to your transplant center, so keep your transplant centers phone number handy. But, while you research your transplant needs and learn  as much as you can about transplantation and donation, we welcome your call to our toll-free patient services line at [phone].

## 2019-09-09 ENCOUNTER — TELEPHONE (OUTPATIENT)
Dept: HEMATOLOGY/ONCOLOGY | Facility: CLINIC | Age: 29
End: 2019-09-09

## 2019-09-10 ENCOUNTER — TELEPHONE (OUTPATIENT)
Dept: HEMATOLOGY/ONCOLOGY | Facility: CLINIC | Age: 29
End: 2019-09-10

## 2019-09-13 ENCOUNTER — TELEPHONE (OUTPATIENT)
Dept: TRANSPLANT | Facility: CLINIC | Age: 29
End: 2019-09-13

## 2019-09-13 NOTE — TELEPHONE ENCOUNTER
----- Message from Blanca Biggs sent at 9/13/2019  8:56 AM CDT -----  Dwaine is calling to cancel their appointment that is currently scheduled for 9/17/19 at 1:00 AM/PM.    Please contact pt at [phone] regarding this message if necessary.    Pt wants to resched appt    Thanks.

## 2019-09-23 ENCOUNTER — TELEPHONE (OUTPATIENT)
Dept: TRANSPLANT | Facility: CLINIC | Age: 29
End: 2019-09-23

## 2019-09-24 ENCOUNTER — TELEPHONE (OUTPATIENT)
Dept: HEMATOLOGY/ONCOLOGY | Facility: CLINIC | Age: 29
End: 2019-09-24

## 2019-09-24 ENCOUNTER — TELEPHONE (OUTPATIENT)
Dept: TRANSPLANT | Facility: CLINIC | Age: 29
End: 2019-09-24

## 2019-09-24 NOTE — TELEPHONE ENCOUNTER
I was able to speak with Dr. Posada on 9/24/19 and he stated that the leukocytosis was felt to be reactive leukocytosis but he agreed to checking inflammatory and infectious markers in light of patient being considered for renal transplantation. He voiced that if transplant team felt we needed bone marrow biopsy he would do it but stated that he doesn't like performing bone marrow biopsies in sickle cell patients due to the bones having ischemia and being brittle often resulting in low yield of the bone marrow biopsy.     Dr. Posada stated that he does not have a Hb threshold for blood transfusion in any of his sickle cell patient and often will let a patient have a Hb in the 6-7s without transfusing but this patient calls their office voicing fatigue and essentially requests blood transfusions. Dr. Posada states that the patient is not really getting insurance approval for ABY agents either complicating his anemia management. He has already discussed with patient in the past regarding risks with multiple blood transfusions especially since patient already has evidence of iron overload and plans to do so again at time of his next visit.     Dr. Posada confirmed that patient has not been on prednisone for at least the last month.     ----- Message from Dorothy Ayala MD sent at 9/23/2019  7:41 PM CDT -----  Regarding: RE: Dr. Posada [phone] (outside Hem)  I have not been able to reach this doctor. Either he's busy or I'm busy!  Since we have BUD Tuesday & Wednesday, I will ask that you review case with one of the BUDs, copied here.    ----- Message -----  From: Na Shah  Sent: 9/6/2019  11:21 AM CDT  To: Dorothy Ayala MD  Subject: Dr. Posada [phone] (outside Hem)              Dr. Brewster,  Please see below the answers from this patient's mother. Also I got up to date Hem notes. I scanned them in media. Can you please review prior to calling Dr. Posada. Looks like some of the discontinued  "medications are listed in his HPI but new medications are listed in Plan. Confusing!    UTD transplant candidacy: multiple hematological  issues including cause for leukocytosis, why not on hydroxyurea, hemoglobin threshold for blood transfusions and why on prednisone 20 mg daily. Also need to clarify if patent is still have gastric ulcers. Patient at risk for renal artery thrombosis due to  Abnormal Protein C      Committee decision discussed with patient's mother per patient request. She stated that patient:  No longer taking Prednisone, he was taking it for a Gout flare.  Not taking hydroxyurea due to intolerance "pt nails were turning black" currently taking Endari  No longer taking Exjade but taking Ferriprox  Stated that pt hasn't had a crisis in a couple of years due to scheduled transfusions. Pt gets week lab draws and transfusion depends on results.    Pt doesn't have gastric ulcers seen 2-3 years age. She stated that patient had surgery to because "he had a hold in his stomach"        "

## 2019-09-25 ENCOUNTER — INITIAL CONSULT (OUTPATIENT)
Dept: HEMATOLOGY/ONCOLOGY | Facility: CLINIC | Age: 29
End: 2019-09-25
Payer: MEDICARE

## 2019-09-25 ENCOUNTER — LAB VISIT (OUTPATIENT)
Dept: LAB | Facility: HOSPITAL | Age: 29
End: 2019-09-25
Attending: INTERNAL MEDICINE
Payer: MEDICARE

## 2019-09-25 VITALS
OXYGEN SATURATION: 100 % | RESPIRATION RATE: 18 BRPM | BODY MASS INDEX: 25.67 KG/M2 | SYSTOLIC BLOOD PRESSURE: 142 MMHG | HEART RATE: 75 BPM | HEIGHT: 67 IN | WEIGHT: 163.56 LBS | DIASTOLIC BLOOD PRESSURE: 85 MMHG | TEMPERATURE: 99 F

## 2019-09-25 DIAGNOSIS — N18.4 CKD (CHRONIC KIDNEY DISEASE), STAGE IV: ICD-10-CM

## 2019-09-25 DIAGNOSIS — D63.1 ANEMIA SECONDARY TO RENAL FAILURE: ICD-10-CM

## 2019-09-25 DIAGNOSIS — D72.823 LEUKEMOID REACTION: ICD-10-CM

## 2019-09-25 DIAGNOSIS — N18.9 ANEMIA SECONDARY TO RENAL FAILURE: ICD-10-CM

## 2019-09-25 DIAGNOSIS — E83.111 IRON OVERLOAD DUE TO REPEATED RED BLOOD CELL TRANSFUSIONS: ICD-10-CM

## 2019-09-25 DIAGNOSIS — D57.1 SICKLE CELL DISEASE WITHOUT CRISIS: Primary | ICD-10-CM

## 2019-09-25 DIAGNOSIS — D57.1 SICKLE CELL DISEASE WITHOUT CRISIS: ICD-10-CM

## 2019-09-25 DIAGNOSIS — M10.40 OTHER SECONDARY ACUTE GOUT, UNSPECIFIED SITE: ICD-10-CM

## 2019-09-25 PROBLEM — M10.9 GOUT: Status: ACTIVE | Noted: 2019-09-25

## 2019-09-25 LAB
ALBUMIN SERPL BCP-MCNC: 3.7 G/DL (ref 3.5–5.2)
ALP SERPL-CCNC: 136 U/L (ref 55–135)
ALT SERPL W/O P-5'-P-CCNC: 21 U/L (ref 10–44)
ANION GAP SERPL CALC-SCNC: 11 MMOL/L (ref 8–16)
ANISOCYTOSIS BLD QL SMEAR: SLIGHT
AST SERPL-CCNC: 40 U/L (ref 10–40)
BASOPHILS # BLD AUTO: 0.15 K/UL (ref 0–0.2)
BASOPHILS NFR BLD: 0.8 % (ref 0–1.9)
BILIRUB SERPL-MCNC: 1.3 MG/DL (ref 0.1–1)
BUN SERPL-MCNC: 104 MG/DL (ref 6–20)
CALCIUM SERPL-MCNC: 8.6 MG/DL (ref 8.7–10.5)
CHLORIDE SERPL-SCNC: 113 MMOL/L (ref 95–110)
CO2 SERPL-SCNC: 16 MMOL/L (ref 23–29)
CREAT SERPL-MCNC: 4.9 MG/DL (ref 0.5–1.4)
DIFFERENTIAL METHOD: ABNORMAL
EOSINOPHIL # BLD AUTO: 0.8 K/UL (ref 0–0.5)
EOSINOPHIL NFR BLD: 4.1 % (ref 0–8)
ERYTHROCYTE [DISTWIDTH] IN BLOOD BY AUTOMATED COUNT: 18.8 % (ref 11.5–14.5)
EST. GFR  (AFRICAN AMERICAN): 17.1 ML/MIN/1.73 M^2
EST. GFR  (NON AFRICAN AMERICAN): 14.8 ML/MIN/1.73 M^2
FERRITIN SERPL-MCNC: 6071 NG/ML (ref 20–300)
GIANT PLATELETS BLD QL SMEAR: PRESENT
GLUCOSE SERPL-MCNC: 99 MG/DL (ref 70–110)
HCT VFR BLD AUTO: 26.2 % (ref 40–54)
HGB BLD-MCNC: 8.4 G/DL (ref 14–18)
HYPOCHROMIA BLD QL SMEAR: ABNORMAL
IMM GRANULOCYTES # BLD AUTO: 0.12 K/UL (ref 0–0.04)
IMM GRANULOCYTES NFR BLD AUTO: 0.6 % (ref 0–0.5)
INR PPP: 1.1 (ref 0.8–1.2)
IRON SERPL-MCNC: 227 UG/DL (ref 45–160)
LDH SERPL L TO P-CCNC: 525 U/L (ref 110–260)
LYMPHOCYTES # BLD AUTO: 3.6 K/UL (ref 1–4.8)
LYMPHOCYTES NFR BLD: 19.6 % (ref 18–48)
MCH RBC QN AUTO: 28.4 PG (ref 27–31)
MCHC RBC AUTO-ENTMCNC: 32.1 G/DL (ref 32–36)
MCV RBC AUTO: 89 FL (ref 82–98)
MONOCYTES # BLD AUTO: 2.4 K/UL (ref 0.3–1)
MONOCYTES NFR BLD: 12.8 % (ref 4–15)
NEUTROPHILS # BLD AUTO: 11.5 K/UL (ref 1.8–7.7)
NEUTROPHILS NFR BLD: 62.1 % (ref 38–73)
NRBC BLD-RTO: 1 /100 WBC
OVALOCYTES BLD QL SMEAR: ABNORMAL
PAPPENHEIMER BOD BLD QL SMEAR: PRESENT
PLATELET # BLD AUTO: 211 K/UL (ref 150–350)
PLATELET BLD QL SMEAR: ABNORMAL
PMV BLD AUTO: 12 FL (ref 9.2–12.9)
POIKILOCYTOSIS BLD QL SMEAR: SLIGHT
POLYCHROMASIA BLD QL SMEAR: ABNORMAL
POTASSIUM SERPL-SCNC: 5.7 MMOL/L (ref 3.5–5.1)
PROT SERPL-MCNC: 7.6 G/DL (ref 6–8.4)
PROTHROMBIN TIME: 11.2 SEC (ref 9–12.5)
RBC # BLD AUTO: 2.96 M/UL (ref 4.6–6.2)
RETICS/RBC NFR AUTO: 2.9 % (ref 0.4–2)
SATURATED IRON: 113 % (ref 20–50)
SICKLE CELLS BLD QL SMEAR: ABNORMAL
SODIUM SERPL-SCNC: 140 MMOL/L (ref 136–145)
TARGETS BLD QL SMEAR: ABNORMAL
TOTAL IRON BINDING CAPACITY: 201 UG/DL (ref 250–450)
TRANSFERRIN SERPL-MCNC: 136 MG/DL (ref 200–375)
WBC # BLD AUTO: 18.47 K/UL (ref 3.9–12.7)

## 2019-09-25 PROCEDURE — 82728 ASSAY OF FERRITIN: CPT | Mod: NTX

## 2019-09-25 PROCEDURE — 85045 AUTOMATED RETICULOCYTE COUNT: CPT | Mod: NTX

## 2019-09-25 PROCEDURE — 85305 CLOT INHIBIT PROT S TOTAL: CPT | Mod: NTX

## 2019-09-25 PROCEDURE — 85660 RBC SICKLE CELL TEST: CPT | Mod: TXP

## 2019-09-25 PROCEDURE — 99215 PR OFFICE/OUTPT VISIT, EST, LEVL V, 40-54 MIN: ICD-10-PCS | Mod: S$GLB,,, | Performed by: INTERNAL MEDICINE

## 2019-09-25 PROCEDURE — 85025 COMPLETE CBC W/AUTO DIFF WBC: CPT | Mod: NTX

## 2019-09-25 PROCEDURE — 83020 HEMOGLOBIN ELECTROPHORESIS: CPT | Mod: TXP

## 2019-09-25 PROCEDURE — 3008F PR BODY MASS INDEX (BMI) DOCUMENTED: ICD-10-PCS | Mod: CPTII,S$GLB,, | Performed by: INTERNAL MEDICINE

## 2019-09-25 PROCEDURE — 85303 CLOT INHIBIT PROT C ACTIVITY: CPT | Mod: TXP

## 2019-09-25 PROCEDURE — 83615 LACTATE (LD) (LDH) ENZYME: CPT | Mod: NTX

## 2019-09-25 PROCEDURE — 85610 PROTHROMBIN TIME: CPT | Mod: TXP

## 2019-09-25 PROCEDURE — 99999 PR PBB SHADOW E&M-EST. PATIENT-LVL III: CPT | Mod: PBBFAC,,, | Performed by: INTERNAL MEDICINE

## 2019-09-25 PROCEDURE — 99999 PR PBB SHADOW E&M-EST. PATIENT-LVL III: ICD-10-PCS | Mod: PBBFAC,,, | Performed by: INTERNAL MEDICINE

## 2019-09-25 PROCEDURE — 83540 ASSAY OF IRON: CPT | Mod: TXP

## 2019-09-25 PROCEDURE — 80053 COMPREHEN METABOLIC PANEL: CPT | Mod: TXP

## 2019-09-25 PROCEDURE — 86833 HLA CLASS II HIGH DEFIN QUAL: CPT | Mod: PO,TXP

## 2019-09-25 PROCEDURE — 3008F BODY MASS INDEX DOCD: CPT | Mod: CPTII,S$GLB,, | Performed by: INTERNAL MEDICINE

## 2019-09-25 PROCEDURE — 99215 OFFICE O/P EST HI 40 MIN: CPT | Mod: S$GLB,,, | Performed by: INTERNAL MEDICINE

## 2019-09-25 PROCEDURE — 86832 HLA CLASS I HIGH DEFIN QUAL: CPT | Mod: PO,TXP

## 2019-09-25 RX ORDER — COLCHICINE 0.6 MG/1
TABLET ORAL
COMMUNITY

## 2019-09-25 RX ORDER — CARVEDILOL 12.5 MG/1
TABLET ORAL
COMMUNITY

## 2019-09-25 RX ORDER — ACETAMINOPHEN AND CODEINE PHOSPHATE 300; 30 MG/1; MG/1
TABLET ORAL
COMMUNITY
End: 2020-05-04

## 2019-09-25 NOTE — Clinical Note
-cbc, ldh, retic count, hemoglobin electrophoresis, protein c activity, protein s activity, iron tibc, ferriitn today

## 2019-09-25 NOTE — LETTER
September 25, 2019      Minda Coleman NP  [address]  [facility]  1st Floor Clinic  [facility] [address]           [facility]  [address]  [facility] [address]  Phone: [phone]          Patient: Ambrosio Newman   MR Number: [id number]   YOB: 1990   Date of Visit: 9/25/2019       Dear Minda Coleman:    Thank you for referring Ambrosio Newman to me for evaluation. Attached you will find relevant portions of my assessment and plan of care.    If you have questions, please do not hesitate to call me. I look forward to following Ambrosio Newman along with you.    Sincerely,    Jessica Childress MD    Enclosure  CC:  No Recipients    If you would like to receive this communication electronically, please contact [email or web address] or [phone] to request more information on [employer] Link access.    For providers and/or their staff who would like to refer a patient to [facility], please contact us through our one-stop-shop provider referral line, [facility], at [phone].    If you feel you have received this communication in error or would no longer like to receive these types of communications, please e-mail [email or web address]

## 2019-09-25 NOTE — PROGRESS NOTES
CC: Transplant clearance, initial visit    HPI: Mr. 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 he is followed by Dr. Mercy Posada in [place].    Etiology of renal disease is presumed to be sickle cell anemia. He is currently being evaluated for a renal transplant here. He is pre-dialysis at this time.  He is currently taking Endari ( L Glutamine). He has been receiving blood transfusions intermittently. He has not had pain crises in the past 1 year.   He is here with his mother. Most of the history is provided by her.         Review of Systems   Constitutional: Positive for malaise/fatigue. Negative for chills and fever.   HENT: Negative for congestion, ear discharge, ear pain, nosebleeds and sinus pain.    Eyes: Negative for blurred vision, double vision, photophobia, pain and discharge.   Respiratory: Negative for cough.    Cardiovascular: Negative for chest pain, orthopnea, claudication and leg swelling.   Gastrointestinal: Negative for abdominal pain, diarrhea and vomiting.   Genitourinary: Negative for dysuria, frequency, hematuria and urgency.   Musculoskeletal: Negative for joint pain.   Neurological: Negative for dizziness, tingling, tremors, sensory change, speech change and headaches.   Endo/Heme/Allergies: Negative for environmental allergies.   Psychiatric/Behavioral: Negative for depression, hallucinations and substance abuse.          Past Medical History:   Diagnosis Date    Allergy     Choledocholithiasis     Chronic gout of multiple sites 1/16/2019    CKD (chronic kidney disease), stage IV     CVA (cerebral vascular accident)     Encounter for blood transfusion     Gout     HTN (hypertension)     Iron overload     Iron overload due to repeated red blood cell transfusions 1/16/2019    Sickle cell anemia     Sickle cell disease without  crisis 1/16/2019         Past Surgical History:   Procedure Laterality Date    CHOLECYSTECTOMY      ENDOSCOPIC ULTRASOUND OF UPPER GASTROINTESTINAL TRACT N/A 1/17/2019    Procedure: ULTRASOUND, ENDOSCOPIC, UPPER GI TRACT;  Surgeon: Dickson Coto MD;  Location: [facility] ([address] [place] [address]);  Service: Endoscopy;  Laterality: N/A;    ERCP N/A 1/17/2019    Procedure: ERCP (ENDOSCOPIC RETROGRADE CHOLANGIOPANCREATOGRAPHY);  Surgeon: Dickson Coto MD;  Location: [facility] ([address] [place] [address]);  Service: Endoscopy;  Laterality: N/A;    ERCP N/A 3/21/2019    Procedure: ERCP (ENDOSCOPIC RETROGRADE CHOLANGIOPANCREATOGRAPHY);  Surgeon: Dickson Coto MD;  Location: [facility] ([address] [place] [address]);  Service: Endoscopy;  Laterality: N/A;    ruptured gastric ulcer with amy patch      TONSILLECTOMY      TUNNELED VENOUS PORT PLACEMENT           Family History   Problem Relation Age of Onset    Diabetes Mother     Diabetes Sister     Diabetes Maternal Grandmother     Heart disease Maternal Grandfather     Drug abuse Father          Social History     Socioeconomic History    Marital status: Unknown     Spouse name: Not on file    Number of children: Not on file    Years of education: Not on file    Highest education level: Not on file   Occupational History    Not on file   Social Needs    Financial resource strain: Not on file    Food insecurity:     Worry: Not on file     Inability: Not on file    Transportation needs:     Medical: Not on file     Non-medical: Not on file   Tobacco Use    Smoking status: Never Smoker    Smokeless tobacco: Never Used   Substance and Sexual Activity    Alcohol use: No     Frequency: Never    Drug use: No    Sexual activity: Not Currently     Partners: Male   Lifestyle    Physical activity:     Days per week: Not on file     Minutes per session: Not on file    Stress: Not on file   Relationships    Social connections:     Talks on phone: Not on file     Gets together: Not on file      Attends Taoist service: Not on file     Active member of club or organization: Not on file     Attends meetings of clubs or organizations: Not on file     Relationship status: Not on file   Other Topics Concern    Not on file   Social History Narrative    Not on file         Review of patient's allergies indicates:   Allergen Reactions    Bactrim [sulfamethoxazole-trimethoprim] Hives and Rash         Current Outpatient Medications   Medication Sig    calcitRIOL (ROCALTROL) 0.25 MCG Cap Take 0.25 mcg by mouth every Mon, Wed, Fri.    carvedilol (COREG) 12.5 MG tablet Take 12.5 mg by mouth once daily.     deferiprone 500 mg Tab Take 500 mg by mouth 3 (three) times daily.    folic acid (FOLVITE) 1 MG tablet Take 1 mg by mouth once daily.    furosemide (LASIX) 40 MG tablet Take 40 mg by mouth once daily.    HYDROcodone-acetaminophen (NORCO) 5-325 mg per tablet Take 1 tablet by mouth every 6 (six) hours as needed for Pain.     No current facility-administered medications for this visit.          Physical Exam   Constitutional: He is oriented to person, place, and time. He appears well-developed.   HENT:   Head: Normocephalic and atraumatic.   Cardiovascular: Normal rate.   Pulmonary/Chest: Effort normal.   Abdominal: Soft. He exhibits no distension. There is no tenderness. There is no rebound.   Musculoskeletal: He exhibits no edema.   Lymphadenopathy:     He has no cervical adenopathy.   Neurological: He is alert and oriented to person, place, and time. No cranial nerve deficit.   He has flexion deformity of hid left wrist. He walks with altered gait   Skin: Skin is warm.       Component      Latest Ref Rng & Units 6/27/2019   Varicella IgG      0.00 - 0.90 ISR 5.71 (H)   Varicella Interpretation      Negative Positive (A)   APA Isotype IgG      0.00 - 14.99 GPL <9.40   APA Isotype IgM      0.00 - 12.49 MPL <9.40   Prothrombin Gene Mutation       See Below   Antithrombin III      83 - 118 % 87   DRVVT, Lupus  Anticoagulant      Negative SEE COMMENT   Protein S Activity      70 - 140 % 85   Protein C Activity      70 - 140 % 46 (L)   Factor V Leiden       See Below     Component      Latest Ref Rng & Units 6/27/2019   WBC      3.90 - 12.70 K/uL 21.59 (H)   RBC      4.60 - 6.20 M/uL 2.87 (L)   Hemoglobin      14.0 - 18.0 g/dL 8.2 (L)   Hematocrit      40.0 - 54.0 % 24.5 (L)   MCV      82 - 98 fL 85   MCH      27.0 - 31.0 pg 28.6   MCHC      32.0 - 36.0 g/dL 33.5   RDW      11.5 - 14.5 % 17.3 (H)   Platelets      150 - 350 K/uL 308   MPV      9.2 - 12.9 fL 11.8   Immature Granulocytes      0.0 - 0.5 % 0.8 (H)   Gran # (ANC)      1.8 - 7.7 K/uL 13.4 (H)   Immature Grans (Abs)      0.00 - 0.04 K/uL 0.18 (H)   Lymph #      1.0 - 4.8 K/uL 4.9 (H)   Mono #      0.3 - 1.0 K/uL 2.0 (H)   Eos #      0.0 - 0.5 K/uL 0.9 (H)   Baso #      0.00 - 0.20 K/uL 0.24 (H)   nRBC      0 /100 WBC 0   Gran%      38.0 - 73.0 % 62.0   Lymph%      18.0 - 48.0 % 22.8   Mono%      4.0 - 15.0 % 9.2   Eosinophil%      0.0 - 8.0 % 4.1   Basophil%      0.0 - 1.9 % 1.1   Differential Method       Automated   Sodium      136 - 145 mmol/L 135 (L)   Potassium      3.5 - 5.1 mmol/L 5.1   Chloride      95 - 110 mmol/L 109   CO2      23 - 29 mmol/L 15 (L)   Glucose      70 - 110 mg/dL 80   BUN, Bld      6 - 20 mg/dL 99 (H)   Creatinine      0.5 - 1.4 mg/dL 5.4 (H)   Calcium      8.7 - 10.5 mg/dL 8.6 (L)   PROTEIN TOTAL      6.0 - 8.4 g/dL 6.9   Albumin      3.5 - 5.2 g/dL 3.2 (L)   BILIRUBIN TOTAL      0.1 - 1.0 mg/dL 0.7   Alkaline Phosphatase      55 - 135 U/L 124   AST      10 - 40 U/L 24   ALT      10 - 44 U/L 17   Anion Gap      8 - 16 mmol/L 11   eGFR if African American      >60 mL/min/1.73 m:2 15.2 (A)   eGFR if non African American      >60 mL/min/1.73 m:2 13.2 (A)   Hep B Core Total Ab       Negative   Hepatitis B Surface Ag       Negative   HIV 1/2 Ag/Ab      Negative Negative   Hepatitis C Ab       Negative   aPTT      21.0 - 32.0 sec 28.6    Bilirubin, Direct      0.1 - 0.3 mg/dL 0.4 (H)   Antithrombin III      83 - 118 % 87     Assessment:    1. Sickle cell disease  2. Stage IV CKD  3. H/o gout  4. Transfusion hemosiderosis  5. Protein C deficiency    Plan:    1,2: his CKD has been attributed to his SCD. He has been evaluated by transplant clinic here for renal transplant. It has been deemed to be riskier than transplants for patients without SCD.      There are are no established guidelines for hematologic management of sickle cell patients for renal transplants. However, extesnive guidelines are available for kingston-operative management in general:      -- to ensure that patient's preoperative hemoglobin is between 9-10?g/dL ( either through simple transfusion or through partial exchange transfusions over several days before the transplant)  --target Hgb S prior to surgery is < 60% ( Hgb electrophoresis is being repeated today)  -- to minimize alloimmunization by giving antigen-matched blood.  -- diligent preoperative monitoring of intake and output, hematocrit, peripheral perfusion, and oxygenation status.  -- intraoperative monitoring of blood pressure, cardiac rhythm and rate, and oxygenation.  -- intensive postoperative care with respect to hydration, oxygen administration with careful monitoring, and respiratory therapy.  --minimizing or avoiding diuretic use  --using daily folic acid  --ensuring he is immunized agaisnt pneumococcus and hemophilus influenza ( he is not sure about his current vaccination status)  --Blood bank consultation recommended prior to transplant to ensure availability of phenotypically matched RBCs, irradiated blood products, and for partial exchange transdfusion    Overall, this is a very high risk transplant, both due to increased episodes of crises after the surgery as well as increased risk of acute and chronic graft rejection       4. On Deferioprone    5. Will repeat protein C acticity today and protein S activity  as well. He is not on any anti-coagulation. He had CVA when he was 8. No clear family h/o hypercoagulable state.  I fhe has persistent low protein c and/or protein S deficiency, he will require prompt kingston-operative (pre and post ) anti-coagulation with unfractionated heparin (at prophylactic doses).

## 2019-09-27 LAB
APTT PROTEIN C ACTIVATOR+FV DP/APTT PPP: 52 % (ref 70–140)
HGB A MFR BLD ELPH: 80.9 % (ref 95.8–98)
HGB A2 MFR BLD: 2.7 % (ref 2–3.3)
HGB F MFR BLD: 0 % (ref 0–0.9)
HGB FRACT BLD ELPH-IMP: ABNORMAL
HGB OTHER MFR BLD ELPH: ABNORMAL %
HGB S BLD QL: POSITIVE
PROT S ACT/NOR PPP: 77 % (ref 70–140)
THEVP VARIANT 2: ABNORMAL
THEVP VARIANT 3: ABNORMAL

## 2019-10-09 LAB — HPRA INTERPRETATION: NORMAL

## 2019-10-10 LAB
CLASS I ANTIBODIES - LUMINEX: NORMAL
CLASS I ANTIBODY COMMENTS - LUMINEX: NORMAL
CLASS II ANTIBODY COMMENTS - LUMINEX: NORMAL
CPRA %: 7
SERUM COLLECTION DT - LUMINEX CLASS I: NORMAL
SERUM COLLECTION DT - LUMINEX CLASS II: NORMAL
SPCL1 TESTING DATE: NORMAL
SPCL2 TESTING DATE: NORMAL
SPCLU TESTING DATE: NORMAL

## 2019-10-25 ENCOUNTER — COMMITTEE REVIEW (OUTPATIENT)
Dept: TRANSPLANT | Facility: CLINIC | Age: 29
End: 2019-10-25

## 2019-10-25 DIAGNOSIS — Z76.82 ORGAN TRANSPLANT CANDIDATE: Primary | ICD-10-CM

## 2019-10-25 NOTE — LETTER
October 25, 2019    Keaton Odom MD  [facility] [address]  [place] [address]       Dear Dr. Odom:    Patient: Ambrosio Newman   MR Number: [id number]   YOB: 1990     Your patient, Ambrosio Newman, was recently discussed at the [facility] Selection Committee meeting on 9/6/2019. I am happy to inform you that Ambrosio has been approved for transplantation.  He has met selection criteria for a kidney transplant related to CKD stage 4 secondary to primary diagnosis of Sickle Cell Anemia. Your patient will be placed on the cadaveric wait list pending final financial approval from insurance company.  Your patient be listed as status 7 until inflammatory work-up has be resolved. He will also need a Neurology consult secondary to low protein C and protein S with history of CVA.        We appreciate your confidence in allowing us to participate in your patients care.  If you have any questions or concerns, please do not hesitate to contact me.    Sincerely,      Dorothy Cristina MD  Medical Director, Kidney & Kidney/Pancreas Transplantation  [facility]  Copy to patient

## 2019-10-25 NOTE — COMMITTEE REVIEW
Native Organ Dx: Sickle Cell Anemia      SELECTION COMMITTEE NOTE    Ambrosio Newman was re-presented at selection committee on 10/25/19.  Patient met selection criteria for kidney transplant related to CKD, Stage 5 due to    Sickle Cell Anemia.  No absolute contraindications to transplant at this time.  Patient will be placed on the cadaveric wait list pending final financial approval from insurance company.  Patient will return to clinic for routine appointment in 1 year(s). Patient does not meet criteria for High KDPI kidney offer due to age. Patient meets HCV+ kidney offer. Patient does not meet criteria for dual/enbloc, due to age. Patient be listed as status 7 until inflammatory work-up resolved. Neurology consult secondary to low protein C and protein S, h/o CVA.    Left message on voicemail to contact coordinator.     Note written by Na Shah RN    ===============================================    I was present at the meeting and attest to the decision of the committee

## 2019-11-01 ENCOUNTER — TELEPHONE (OUTPATIENT)
Dept: TRANSPLANT | Facility: CLINIC | Age: 29
End: 2019-11-01

## 2019-11-01 NOTE — TELEPHONE ENCOUNTER
Attempted call to patient in regards to scheduling Neurology consult and treatment for +strongyloides. Left message on voicemail.

## 2019-11-06 ENCOUNTER — TELEPHONE (OUTPATIENT)
Dept: INFECTIOUS DISEASES | Facility: HOSPITAL | Age: 29
End: 2019-11-06

## 2019-11-07 NOTE — TELEPHONE ENCOUNTER
Called patient's mother to discuss questions she had re vaccines.  No answer.  Left message to return my call.     ----- Message from Na Pablo sent at 11/6/2019 10:35 AM CST -----  Trever,  Can you please call his mother about the vaccines? She has questions. [phone]. Also, she stated that he completed strongyloides.   ----- Message -----  From: HALEY Padilla, ANP  Sent: 11/1/2019  10:50 PM CST  To: Na Tenaarlette Na:  Will you ask the patient if he completed the vaccines I gave him prescriptions for since he lives out of town?  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  Just would like to ensure he had these done.  Thanks.

## 2019-11-08 ENCOUNTER — TELEPHONE (OUTPATIENT)
Dept: INFECTIOUS DISEASES | Facility: HOSPITAL | Age: 29
End: 2019-11-08

## 2019-11-08 NOTE — TELEPHONE ENCOUNTER
----- Message from Cnosuelo Guallpa MA sent at 11/8/2019  1:28 PM CST -----  Contact:   Dwaine        tel:  [phone]         ----- Message -----  From: Fabiana Valenzuela  Sent: 11/8/2019   1:01 PM CST  To: LORELEI Neff's pt./   Returning a call to Trever Cunha ref. Vaccines her son needs.   Returning a call to Trever.   Pls call back today.

## 2019-11-12 ENCOUNTER — TELEPHONE (OUTPATIENT)
Dept: TRANSPLANT | Facility: CLINIC | Age: 29
End: 2019-11-12

## 2019-11-12 DIAGNOSIS — Z76.82 ORGAN TRANSPLANT CANDIDATE: Primary | ICD-10-CM

## 2019-11-18 ENCOUNTER — TELEPHONE (OUTPATIENT)
Dept: FAMILY MEDICINE | Facility: CLINIC | Age: 29
End: 2019-11-18

## 2019-11-18 NOTE — TELEPHONE ENCOUNTER
----- Message from Enriqueta Turner sent at 11/18/2019  2:11 PM CST -----  Contact: Mother [phone]  Pt's mom called regarding the shots and would like you to give her a callback to further discuss.

## 2019-11-20 ENCOUNTER — LAB VISIT (OUTPATIENT)
Dept: LAB | Facility: HOSPITAL | Age: 29
End: 2019-11-20
Payer: MEDICARE

## 2019-11-20 ENCOUNTER — OFFICE VISIT (OUTPATIENT)
Dept: NEUROLOGY | Facility: CLINIC | Age: 29
End: 2019-11-20
Payer: MEDICARE

## 2019-11-20 VITALS
DIASTOLIC BLOOD PRESSURE: 94 MMHG | WEIGHT: 163 LBS | HEIGHT: 67 IN | HEART RATE: 81 BPM | SYSTOLIC BLOOD PRESSURE: 147 MMHG | BODY MASS INDEX: 25.58 KG/M2

## 2019-11-20 DIAGNOSIS — K25.5 GASTRIC ULCER WITH PERFORATION, UNSPECIFIED CHRONICITY: ICD-10-CM

## 2019-11-20 DIAGNOSIS — Z76.82 ORGAN TRANSPLANT CANDIDATE: ICD-10-CM

## 2019-11-20 DIAGNOSIS — N18.4 CKD (CHRONIC KIDNEY DISEASE), STAGE IV: ICD-10-CM

## 2019-11-20 DIAGNOSIS — I63.311 CEREBRAL INFARCTION DUE TO THROMBOSIS OF RIGHT MIDDLE CEREBRAL ARTERY: ICD-10-CM

## 2019-11-20 DIAGNOSIS — D57.1 SICKLE CELL DISEASE WITHOUT CRISIS: Primary | ICD-10-CM

## 2019-11-20 DIAGNOSIS — E83.111 IRON OVERLOAD DUE TO REPEATED RED BLOOD CELL TRANSFUSIONS: ICD-10-CM

## 2019-11-20 DIAGNOSIS — Z86.73 HISTORY OF STROKE: ICD-10-CM

## 2019-11-20 PROCEDURE — 86833 HLA CLASS II HIGH DEFIN QUAL: CPT | Mod: PO,TXP

## 2019-11-20 PROCEDURE — 99205 PR OFFICE/OUTPT VISIT, NEW, LEVL V, 60-74 MIN: ICD-10-PCS | Mod: S$PBB,TXP,, | Performed by: PSYCHIATRY & NEUROLOGY

## 2019-11-20 PROCEDURE — 99999 PR PBB SHADOW E&M-EST. PATIENT-LVL III: ICD-10-PCS | Mod: PBBFAC,TXP,, | Performed by: PSYCHIATRY & NEUROLOGY

## 2019-11-20 PROCEDURE — 86832 HLA CLASS I HIGH DEFIN QUAL: CPT | Mod: PO,TXP

## 2019-11-20 PROCEDURE — 99999 PR PBB SHADOW E&M-EST. PATIENT-LVL III: CPT | Mod: PBBFAC,TXP,, | Performed by: PSYCHIATRY & NEUROLOGY

## 2019-11-20 PROCEDURE — 99213 OFFICE O/P EST LOW 20 MIN: CPT | Mod: PBBFAC,TXP | Performed by: PSYCHIATRY & NEUROLOGY

## 2019-11-20 PROCEDURE — 99205 OFFICE O/P NEW HI 60 MIN: CPT | Mod: S$PBB,TXP,, | Performed by: PSYCHIATRY & NEUROLOGY

## 2019-11-20 RX ORDER — ASPIRIN 81 MG/1
81 TABLET ORAL DAILY
COMMUNITY
Start: 2019-11-20 | End: 2020-05-04

## 2019-11-20 NOTE — PROGRESS NOTES
Vascular Neurology  Clinic Note    Reason For Visit (Chief Complaint): transplant clearance, history of stroke    HPI: 29 y.o. R-handed man with sickle cell disease, stroke in childhood (residual L hemiparesis), HTN, CKD IV, who presents for evaluation.    Etiology of renal disease is presumed to be sickle cell anemia.  He is being evaluate for renal transplant at [facility].  He is currently pre-dialysis.  During transplant workup he was noted to have persistent leukocytosis (reactive?) and decreased Protein C activity.  Currently not taking hydroxyurea due to adverse reaction.  On L-glutamine.    Had a stroke at age 8.  He was admitted to the hospital at the time [facility] for a pain crisis.  Stroke resulted in L hemiparesis.  Was never on aspirin as far as his mother remembers.  No new recent hospitalizations or new neurologic symptoms.      Brain Imaging:  none    Cardiac Evaluation:  EKG 4/8/19:  Sinus rhythm  Q waves in II, III, avF      Relevant Labwork:  Recent Labs   Lab 01/17/19  0031  06/27/19  0701   Hemoglobin A1C 4.9  --   --    LDL Cholesterol  --    < > 45.0 L   HDL  --    < > 22 L   Triglycerides  --    < > 100   Cholesterol  --    < > 87 L    < > = values in this interval not displayed.     Results for VASQUEZ HUNTER (MRN [id number]) as of 11/21/2019 11:22   6/27/2019 07:01 9/25/2019 14:29   APA Isotype IgG <9.40    APA Isotype IgM <9.40    DRVVT, Lupus Anticoagulant SEE COMMENT    Factor V Leiden See Below    Hex Phosph Neut Test Positive (A)    Protein C Activity 46 (L) 52 (L)   Protein S Activity 85 77   Prothrombin Gene Mutation See Below          I independently viewed the above imaging studies and  I reviewed the reports of the above imaging.  I reviewed the above labwork.    Review of Systems  Msk: negative for muscle pain  Skin: negative for pruritis  Neuro: negative for headache  All others negative    Past Medical History  Past Medical History:   Diagnosis Date    Allergy      "Choledocholithiasis     Chronic gout of multiple sites 1/16/2019    CKD (chronic kidney disease), stage IV     CVA (cerebral vascular accident)     Encounter for blood transfusion     Gout     HTN (hypertension)     Iron overload     Iron overload due to repeated red blood cell transfusions 1/16/2019    Sickle cell anemia     Sickle cell disease without crisis 1/16/2019     Family History  Cousin with SC disease.  Grandfather with stroke in older years.    Social History  Never smoker.  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  Lives in [place].      Medication List with Changes/Refills   New Medications    ASPIRIN (ECOTRIN) 81 MG EC TABLET    Take 1 tablet (81 mg total) by mouth once daily.   Current Medications    ACETAMINOPHEN-CODEINE 300-30MG (TYLENOL #3) 300-30 MG TAB    acetaminophen 300 mg-codeine 30 mg tablet    CARVEDILOL (COREG) 12.5 MG TABLET    carvedilol 12.5 mg tablet    COLCHICINE (COLCRYS) 0.6 MG TABLET    colchicine 0.6 mg tablet    DEFERIPRONE 500 MG TAB    Take 500 mg by mouth 3 (three) times daily.    FOLIC ACID (FOLVITE) 1 MG TABLET    Take 1 mg by mouth once daily.    FUROSEMIDE (LASIX) 40 MG TABLET    Take 40 mg by mouth once daily.    HYDROCODONE-ACETAMINOPHEN (NORCO) 5-325 MG PER TABLET    Take 1 tablet by mouth every 6 (six) hours as needed for Pain.       EXAM  Vital Signs  Pulse: 81  BP: (!) 147/94  Pain Score: 0-No pain  Height and Weight  Height: 5' 7" (170.2 cm)  Weight: 73.9 kg (163 lb)  BSA (Calculated - sq m): 1.87 sq meters  BMI (Calculated): 25.5  Weight in (lb) to have BMI = 25: 159.3]  General: well appearing without discomfort  Eyes: no scleral icterus, conjunctiva clear  Neck: no carotid bruits  CV: RRR, nL S1&S2  Resp: breathing comfortably, no wheezing  Ext: wwp, no pedal edema  Skin: cystic acne on face    Mental Status: Alert and oriented, knows Trump -> Wayne, unable to calculate 7Q in $1.75 (needs help), normal attention, speech fluent and prosodic, " naming and repetition intact, follows multistep embedded commands, no e/o neglect or extinction  Cranial Nerves: PERRL, EOMI, VFF, sensation intact, face symmetric, TUP midline, SCM/trap 5/5  Motor: Increased tone throughout L arm, L arm held in flexor position  L Biceps 2/5, Triceps 2/5, Wrist flex 2/3, Fingers 1/5  L Hip flexors 5/5, hip add/abd 4/5, knee flex/ex 5/5  Sensory: intact light touch bilaterally, intact proprioception bilaterally  Coordination: no ataxia on finger-to-nose or heel-to-shin testing; no truncal ataxia  Gait & Stance: hemiparetic, slight circumduction of L leg  DTR: Clonus at L wrist, DTR 3+ L arm/leg, 2+ R arm/leg    NIH Stroke Scale:    Level of Consciousness: 0 - alert  LOC Questions: 0 - answers both correctly  LOC Commands: 0 - performs both correctly  Best Gaze: 0 - normal  Visual: 0 - no visual loss  Facial Palsy: 0 - normal  Motor Left Arm: 2 - can't resist gravity  Motor Right Arm: 0 - no drift  Motor Left Le - no drift  Motor Right Le - no drift  Limb Ataxia: 0 - absent  Sensory: 0 - normal  Best Language: 0 - no aphasia  Dysarthria: 0 - normal articulation  Extinction and Inattention: 0 - no neglect  NIH Stroke Scale Total: 2        ___________________  ASSESSMENT & PLAN  29 y.o. R-handed man with sickle cell disease, stroke in childhood (residual L hemiparesis), HTN, CKD IV, who presents for evaluation.  He's had no neurological events since childhood, but given his sickle cell he remains susceptible to stroke.  Will order MRI and MRA brain, as SC patients can develop Millan-Millan physiology.  In regards to Protein C activity, consider uremia as cause of decreased activity, but will defer to Hematology.      - MRI brain, MRA head/neck.  Patient prefers to have scans done closer to home, but will have images sent.  - Recommend starting aspirin 81 mg daily for stroke prevention.  Monitor for gastric irritation.  - Chronic transfusion, as indicated (will reduce stroke  risk).  - Encouraged good hydration and medication compliance.  - RTC PRN.      Problem List Items Addressed This Visit        Unprioritized    Sickle cell disease without crisis - Primary    Relevant Orders    MRI Brain Without Contrast    MRA Brain    MRA Neck without contrast    Iron overload due to repeated red blood cell transfusions    Gastric ulcer with perforation    History of stroke    CKD (chronic kidney disease), stage IV      Other Visit Diagnoses     Cerebral infarction due to thrombosis of right middle cerebral artery         Relevant Orders    MRA Brain    MRA Neck without contrast          Kathie Voss MD  Vascular Neurology

## 2019-11-21 ENCOUNTER — TELEPHONE (OUTPATIENT)
Dept: TRANSPLANT | Facility: CLINIC | Age: 29
End: 2019-11-21

## 2019-11-21 DIAGNOSIS — Z76.82 ORGAN TRANSPLANT CANDIDATE: Primary | ICD-10-CM

## 2019-11-21 NOTE — TELEPHONE ENCOUNTER
"  KIDNEY WAIT LISTING NOTE    Date of Financial clearance to list: 10/31/19  LIST STATUS 7  SSN/[facility]:     Organ: Kidney  Name:       Ambrosio Newman   : 1990          Gender:     male    MRN#: [id number]                                 State of Permanent Residence:  [address] [place] [id number]  Ethnicity: /Black   Race:      Black or     CLINICAL INFORMATION   Candidate Medical Urgency Status: Inactive (Status 7). Candidate work-up incomplete.  Number of Previous Kidney Transplants: 0  Number of Previous Solid Organ Transplants: 0  Did you enter number of previous kidney or other solid organ transplants? yes  Is this Candidate a Prior Living Donor: no  (If yes, please generate letter to [employer] with patient's date of donation, recipient SSN, signed by Surgical Director after patient is listed in order to receive priority points).      ABO  ABO Blood Group:   B POS     ABO Confirmation: (THESE DATES MUST BE PRIOR TO THE LIST DATE AND SUPPORTED BY SEPARATE LAB REPORTS)    Internal Results    Lab Results   Component Value Date    GROUPTR B POS 2019    GROUPTRH B POS 2019     No results found for: ABO    External Results    ABO Date 1:    ABO Date 2  Are either of these ABO results based on External Labs? no  (If Yes, STOP and go to source document in Media Tab for verification).    VITALS  Height:  5' 7"  Weight: 157 lbs   (Use height from Transplant clinic visits only).  Did you enter height/weight? Yes    HLA    Class I:  Lab Results   Component Value Date    WGHZ6DZ 2 2019    JPOA2TH 68 2019    TRNU1JE 71 2019    ZRJH2OD 41 2019    IRSJK0PE 6 2019    MTUHR4KC XX 2019    NTUBG8MP 10 2019    QCGOE1DO 7 2019       Class II:  Lab Results   Component Value Date    GROWTC30MZ 17 2019    UWSTHP57QO 7 2019    KROSJH949OK 52 2019    BXCKIS6303 XX 2019    RPKEC4ZA 2 2019    " "FYQMW2JZ XX 06/27/2019       Tested for HLA Antibodies: Yes, antibodies detected     If result is "Positive" antibodies are detected     If result is "Negative or questionable" no antibodies detected    Lab Results   Component Value Date    CIPRAS Positive 06/27/2019    CIIPRAS Negative 06/27/2019       DIALYSIS INFORMATION  Is patient Pre-Dialysis: Yes     GFR Information  Report GFR being used as the criteria for placement on the kidney list. If not, leave blank  GFR < or = 20 ml/min? Yes  If Yes, Specify value  _17.1__   ml/min     Initial date GFR became 20 or less: 9/25/19  Is GFR obtained from an Outside lab Result? No  (If YES verify with source document scanned into media)    If patient on Dialysis:    Is candidate currently on dialysis for ESRD? No  If Yes,  Date Chronic Dialysis Started:   (Not currently on dialysis)  (verify with source document in Media Tab)   [facility]                        Physician Name:  Dr. Dorothy Brewster  NPI#: [id number]    DIABETES INFORMATION  Primary Native Kidney Diagnosis: Sickle Cell Anemia  C-Peptide Value - No results found for: CPEPTIDE  Current Diabetes Status: None    FOR NON-KIDNEY DEPARTMENT USE ONLY:  Additional Organs Registered? none    Maximum Acceptable Number of HLA Mismatches  ABDR:     6      (0-6)               AB:               (0-4)  ADR:   _____  (0-4)              BDR: _____ (0-4)  A:        _____  (0-2)              B:      _____ (0-2)          DR: ______ (0-2)    Will Recipient Accept?   Accept HBcAB Positive Organ:            Yes  Accept HBV URIEL Positive Organ:        no  Accept HCV Antibody Positive Organ: yes   Accept HCV URIEL Positive Organ: yes    Dual Kidney and En Bloc Opt In : No  Dual  Local:   No  Dual Import:   No  En Bloc Local:   No  En Bloc Import: No     Accept KDPI > 85: Single: No     Local: No     Import: No  Accept KDPI > 85: Dual: No     Local: No     Import: No      ### NURSE TO VERIFY " CONSENT AND MAKE ANY NECESSARY CHANGES NEEDED IN UNET AT THE TIME OF VERIFICATION ###    Unacceptible Antigens  If yes, list     Lab Results   Component Value Date    LV8SNYK A23 09/25/2019    CIABCLM WEAK A24 09/25/2019    CIIAB NEGATIVE 06/27/2019    ABCMT WEAK DP1 09/25/2019       ### DO NOT LIST IF ANTIGEN VALUE WEAK ###

## 2019-11-21 NOTE — LETTER
November 22, 2019    Ambrosio Newman  [address]  [place] [address]      Dear Ambrosio Newman:  MRN: [id number]    Congratulations!  This letter is to inform you that you were placed on the kidney transplant waiting list at [facility] on .  You will be listed as Status 7 (inactive status) until Inflammatory work-up is resolved and a neurology consult is completed secondary to low protein C and protein S, h/o CVA.  Your candidacy for kidney transplant is based on the following criteria: Sickle Cell Anemia.      If you have any alternate phone numbers that you would like us to have, please call us with those numbers.  During this time period, you should prepare yourself mentally and make plans for your affairs during the time of your transplant.  You should be ready to leave your home within 15 minutes of receiving the phone call telling you to come in for transplant.     The [employer] for Medicaid Services and the [employer] for Organ Sharing requires that we inform any patient placed on our waiting list of information that would impact their ability to receive a transplant.  [facility]s kidney transplant program has a transplant surgeon and physician available 365 days a year, 24 hours a day and 7 days a week to facilitate organ acceptance, procurement, and implantation, and to address urgent patient issues.  You will be notified in writing of any changes to our key transplant personnel and of any changes to our staffing plan that would impact your ability to receive a transplant.    Attached is a letter from the [employer] for Organ Sharing ([employer]).  It describes the services and information offered to patients by [employer] and the Organ Procurement and Transplant Network.    Again, we congratulate you on your success and look forward to working with you very closely in the future.  If you have any questions or require any additional information, please do not hesitate to contact the pre-transplant office at  [phone].    Sincerely,          Dorothy Cristina MD  Medical Director, Kidney & Kidney/Pancreas Transplantation  lh/enclosure    CC:  Dr. Keaton Odom           OPTN/UNOS: Your Resource for Organ Transplant Information        If you have a question regarding your own medical care, you always should call your transplant center first. However, for general organ transplant-related information, you can call the [employer] for Organ Sharing (UNOS) toll-free patient services line at [phone].    Anyone, including potential transplant candidates, recipients, family members/friends, living donors, and/or donor family members can call this number to:    · talk about organ donation, living donation, how transplant and donation work, the donation process, transplant policies, and transplant/donor information;  · get a free patient information kit with helpful booklets, waiting list and transplant information, and a list of all transplant centers;  · ask questions about the Organ Procurement and Transplantation Network (OPTN) web site (www.optn.transplant.hrsa.gov); the UNOS Web site (www.unos.org); or the UNOS web site for living donors and transplant recipients (www.transplantliving.org);  · learn how [employer] and the OPTN can help you;  · talk about any concerns that you may have with a transplant center and how they perform    UNOS is a not-for-profit organization that provides all of the administrative services for the national OPTN under federal contract to the Health Resources and Services Administration (HRSA), an agency under the U.S. Department of Health and Human Services (HHS).     UN[employer] and OPTN responsibilities include:    · writing educational material for patients, the public and professionals;  · helping to make people aware of the need for donated organs and tissue;  · writing organ transplant policy with help from doctors, nurses, transplant patients/candidates, donor families, living  donors, and the public;  · coordinating the organ matching and placement process;  · collecting information about every organ transplant and donation that occurs in the [place].    Remember, you should contact your transplant center directly if you have questions or concerns about your own medical care including medical records, work-up progress and test reports. [facility] is not your transplant center, and staff at [facility] will not be able to transfer you to your transplant center, so keep your transplant centers phone number handy. But, while you research your transplant needs and learn as much as you can about transplantation and donation, we welcome your call to our toll-free patient services line at [phone].

## 2019-11-27 ENCOUNTER — TELEPHONE (OUTPATIENT)
Dept: INFECTIOUS DISEASES | Facility: CLINIC | Age: 29
End: 2019-11-27

## 2019-11-27 NOTE — TELEPHONE ENCOUNTER
Returned patient's mother's call re pre-transplant and asplenia vaccines  She was unable to find the list of vaccines I had given her in transplant clinic.  He did have Hepatitis A, PCV13 on 6/27.  Should have had Tdap, but didn't for some reason.   They are not coming to the clinic any time soon.   Sent new prescriptions to be taken to PCP:  PPSV23, Menactra booster, Meningitis B vaccine (two doses 4 weeks apart), Tdap, Hepatitis A after 12/27/19.

## 2019-12-02 LAB — HPRA INTERPRETATION: NORMAL

## 2019-12-12 LAB
CLASS I ANTIBODY COMMENTS - LUMINEX: NORMAL
CLASS II ANTIBODIES - LUMINEX: NEGATIVE
CPRA %: 0
SERUM COLLECTION DT - LUMINEX CLASS I: NORMAL
SERUM COLLECTION DT - LUMINEX CLASS II: NORMAL
SPCL1 TESTING DATE: NORMAL
SPCL2 TESTING DATE: NORMAL
SPCLU TESTING DATE: NORMAL

## 2020-01-17 ENCOUNTER — TELEPHONE (OUTPATIENT)
Dept: TRANSPLANT | Facility: CLINIC | Age: 30
End: 2020-01-17

## 2020-02-19 ENCOUNTER — TELEPHONE (OUTPATIENT)
Dept: TRANSPLANT | Facility: CLINIC | Age: 30
End: 2020-02-19

## 2020-02-19 DIAGNOSIS — Z76.82 ORGAN TRANSPLANT CANDIDATE: Primary | ICD-10-CM

## 2020-02-19 NOTE — TELEPHONE ENCOUNTER
----- Message from J Carlos Tinajero RN sent at 2/19/2020  2:28 PM CST -----  Contact: Pt mom ( Dwaine Pitts)      ----- Message -----  From: Luz Blair  Sent: 2/19/2020   2:22 PM CST  To: Kidney Waitlisted Coordinator    Pt mom (Dwaine) was calling to speak with pt nurse.    Dwaine# [phone]

## 2020-02-19 NOTE — TELEPHONE ENCOUNTER
Spoke to mother of patient. Still have not received inflammatory work up and clearance from Dr. PARAM Posada nor have we received MRI brain films for Dr. Voss to review. Message sent to Dr. Voss's office to see if they received films. Mother of patient had them sent via [facility] in December.

## 2020-02-20 ENCOUNTER — TELEPHONE (OUTPATIENT)
Dept: TRANSPLANT | Facility: CLINIC | Age: 30
End: 2020-02-20

## 2020-02-20 NOTE — TELEPHONE ENCOUNTER
Informed mother of patient that [facility] in [place] will resend films of brain MRI. She sent to [facility] [address] but did not indicate department. Will send to me in  Transplant Department.

## 2020-03-19 ENCOUNTER — TELEPHONE (OUTPATIENT)
Dept: TRANSPLANT | Facility: CLINIC | Age: 30
End: 2020-03-19

## 2020-03-19 NOTE — TELEPHONE ENCOUNTER
Late Entry:    3/6/20 - outside MRI films received and downloaded into Epic. Message sent to Dr. Voss to review and provide clearance.

## 2020-03-25 ENCOUNTER — TELEPHONE (OUTPATIENT)
Dept: TRANSPLANT | Facility: CLINIC | Age: 30
End: 2020-03-25

## 2020-03-25 NOTE — TELEPHONE ENCOUNTER
----- Message from Kathie Voss MD sent at 3/25/2020  2:44 PM CDT -----  Regarding: RE: MRI films from outside downloaded  So sorry for the delay! I reviewed the images and side from a remote large R-MCA stroke, the rest of his cerebral vasculature looks healthy.  No signs of Millan Millan changes.  No reason to defer transplant because of his stroke history.    Thanks!  - Kathie Voss    ----- Message -----  From: Bethany Yu  Sent: 3/6/2020  10:39 AM CDT  To: Kathie Voss MD  Subject: MRI films from outside downloaded                Dr. Voss,    We finally received the images of the MRI of the brain on Mr. Newman (you saw him 11/21/19). He has a history of stroke as a child. He is currently inactive on the Kidney Transplant Wait List and we are seeking neurology clearance with risk stratification to undergo a kidney transplant. Please review films that were scanned into Epic and advise. Thank you.    Bethany Yu RN

## 2020-04-09 ENCOUNTER — TELEPHONE (OUTPATIENT)
Dept: TRANSPLANT | Facility: CLINIC | Age: 30
End: 2020-04-09

## 2020-04-09 NOTE — TELEPHONE ENCOUNTER
----- Message from Junior Bravo MD sent at 2020 12:46 PM CDT -----  Ok Thanks  Junior   ----- Message -----  From: Jessica Childress MD  Sent: 2020  12:34 PM CDT  To: Junior Bravo MD    Hi ,    For anti coagulation: his protein C is only mildly deficient, and could even be secondary to his renal impairment. Standard kingston-operative anti coagulation would ideally suffice, with unfractionated heparin.   The added element of risk here is sickle cell disease itself, a known hypercoagulable state. I would suggest kingston- and post operative unfractionated heparin infusion, once hemostasis is achieved.     The exchange transfusion is handled by blood bank here. They have to be involved before and after the surgery. His last HbA was 80%, so not sure what exactly he has ( he has been transfused regularly). If he maintains that degree of HbA he does not need simple or exchange transfusions, but the need can change quickly.   In pt hematology team will be happy to assist, as needed.     [place]      ----- Message -----  From: Junior Bravo MD  Sent: 2020  12:00 PM CDT  To: Bethany Yu, Jessica Childress MD, #    Dr Childress,    You saw this complex case in 2019 for a clearance in anticipation of kidney transplant. He has ESRD and sickle cell disease with low Protein C activity. Does he need anticoagulation and what type of anticoagulation and for how long?    How do we coordinate with the blood bank the safety measure you described in your note in anticipation to kidney transplant, who will be  to do this?    We are very interested in this case because last year we had a sad experience with a young man that  a few months after a living donor kidney transplant from a presumptive PE. He had a normal creatinine and  unexpectedly, he was in the 20's or 30's I believe.    We want to take all the precautions to minimize any devastating complications. Thanks very  much.    Regards.    Junior

## 2020-04-29 ENCOUNTER — COMMITTEE REVIEW (OUTPATIENT)
Dept: TRANSPLANT | Facility: CLINIC | Age: 30
End: 2020-04-29

## 2020-04-29 NOTE — LETTER
May 1, 2020    Ambrosio Teixeira[address]  [facility] [address]    Dear Ambrosio Newman:  MRN: [id number]    The [facility] transplant team reviewed your transplant candidacy.  It is our programs opinion that you are a transplant candidate and are re-activated at our facility as of 5/1/2020.  You are now eligible to receive a transplant.  Your status is now Status 1.    Attached is a letter from the United Network for Organ Sharing (UNOS).  It describes the services and information offered to patients by UNOS and the Organ Procurement and Transplant Network.    The [facility] transplant team remains available to answer any questions.  Should you have any questions regarding this decision, please do not hesitate to contact our pre-transplant office.      Sincerely,        Bethany Yu, CHIKIN, RN, [facility]  Yvette Bain, RN  Vikki Coppola, CHIKIN, RN, [facility]  Becca Grossman, RN  Orestes Velez Jr., RN  Sheila Cunha, RN, [facility]  J Carlos Tniajero, CHIKIN, RN, MPH  CHIKI BlakelyN, RN, [facility]  Pre-Kidney Coordinators    [facility] [address] [address]  [phone]    Cc: Dr. Keaton Odom          The Organ Procurement and Transplantation Network   Toll-free patient services line: Your resource for organ transplant information     If you have a question regarding your own medical care, you always should call your transplant hospital first. However, for general organ transplant-related information, you can call the Organ Procurement and Transplantation Network (OPTN) toll-free patient services line at [phone].     Anyone, including potential transplant candidates, candidates, recipients, family members, friends, living donors, and donor family members, can call this number to:     · Talk about organ donation, living donation, the transplant process, the donation process, and transplant policies.   · Get a free patient information kit with helpful booklets,  waiting list and transplant information, and a list of all transplant hospitals.   · Ask questions about the OPTN website (https://optn.transplant.hrsa.gov/), the United Network for Organ Sharings (UNOS) website (https://unos.org/), or the UNOS website for living donors and transplant recipients. (https://www.transplantliving.org/).   · Learn how the OPTN can help you.   · Talk about any concerns that you may have with a transplant hospital.     The nations transplant system, the OPTN, is managed under federal contract by the United Network for Organ Sharing (UNOS), which is a non-profit charitable organization. The OPTN helps create and define organ sharing policies that make the best use of donated organs. This process continuously evaluating new advances and discoveries so policies can be adapted to best serve patients waiting for transplants. To do so, the OPTN works closely with transplant professionals, transplant patients, transplant candidates, donor families, living donors, and the public. All transplant programs and organ procurement organizations throughout the country are OPTN members and are obligated to follow the policies the OPTN creates for allocating organs.     The OPTN also is responsible for:   · Providing educational material for patients, the public, and professionals.   · Raising awareness of the need for donated organs and tissue.   · Coordinating organ procurement, matching, and placement.   · Collecting information about every organ transplant and donation that occurs in the [place].     Remember, you should contact your transplant hospital directly if you have questions or concerns about your own medical care including medical records, work-up progress, and test results.     We are not your transplant hospital, and our staff will not be able to answer questions about your case, so please keep your transplant hospitals phone number handy.   However, while you research your  transplant needs and learn as much as you can about transplantation and donation, we welcome your call to our toll-free patient services line at [phone].

## 2020-05-01 ENCOUNTER — TELEPHONE (OUTPATIENT)
Dept: TRANSPLANT | Facility: CLINIC | Age: 30
End: 2020-05-01

## 2020-05-01 NOTE — TELEPHONE ENCOUNTER
Spoke to patient, informed him that he has been reactivated on the wait list and will be seen in clinic every 6 months per committee due to sickle cell and protein   c deficiency. Pt voiced understanding.

## 2020-05-01 NOTE — COMMITTEE REVIEW
Native Organ Dx: Sickle Cell Anemia      SELECTION COMMITTEE NOTE    Ambrosio Newman was presented at selection committee on 5/1/20 for discussion. Patient needs to be seen in clinic every 6 months due to sickle cell and protein C deficiency. On call - contact the blood bank for exchange transfusion prior to transplant and consult Hem/Oc. Call Nephrologist on call to help coordinate Hem/Oc consult and blood bank prior to admission. Acceptable hemoglobin is between 9 and 10. Get labs from primary nephrologist. Per Dr. Khan, [place]-op heparin infusion can be started 12 hours post op. Ok to activate on the wait list.         Note written by Bethany Yu RN    ===============================================    I was present at the meeting and attest to the decision of the committee

## 2020-05-04 ENCOUNTER — OFFICE VISIT (OUTPATIENT)
Dept: SURGERY | Facility: CLINIC | Age: 30
End: 2020-05-04
Payer: MEDICARE

## 2020-05-04 VITALS — BODY MASS INDEX: 25.06 KG/M2 | HEIGHT: 67 IN | WEIGHT: 159.63 LBS

## 2020-05-04 DIAGNOSIS — N18.6 END STAGE RENAL DISEASE: Primary | ICD-10-CM

## 2020-05-04 PROCEDURE — 99213 PR OFFICE/OUTPT VISIT, EST, LEVL III, 20-29 MIN: ICD-10-PCS | Mod: NTX,S$GLB,, | Performed by: SURGERY

## 2020-05-04 PROCEDURE — 99213 OFFICE O/P EST LOW 20 MIN: CPT | Mod: NTX,S$GLB,, | Performed by: SURGERY

## 2020-05-04 RX ORDER — PREDNISONE 20 MG/1
TABLET ORAL
COMMUNITY
End: 2020-10-28

## 2020-05-04 RX ORDER — SODIUM BICARBONATE 650 MG/1
650 TABLET ORAL
COMMUNITY
Start: 2020-05-01 | End: 2020-10-28

## 2020-05-04 NOTE — PROGRESS NOTES
History & Physical    SUBJECTIVE:     History of Present Illness:    30-year-old  male with stage 4 kidney disease preparing to start peritoneal dialysis.  I placed a tunneled peritoneal dialysis catheter in about a year ago and he now needs this exteriorized in anticipation of commencing peritoneal dialysis    Chief Complaint   Patient presents with    Other     exteriorization of PD cath         Review of patient's allergies indicates:  Review of patient's allergies indicates:   Allergen Reactions    Bactrim [sulfamethoxazole-trimethoprim] Hives and Rash       Current Outpatient Medications on File Prior to Visit   Medication Sig Dispense Refill    patiromer calcium sorbitex (VELTASSA) 8.4 gram PwPk Take by mouth.      sodium bicarbonate 650 MG tablet Take 650 mg by mouth.      carvedilol (COREG) 12.5 MG tablet carvedilol 12.5 mg tablet      colchicine (COLCRYS) 0.6 mg tablet colchicine 0.6 mg tablet      deferiprone 500 mg Tab Take 500 mg by mouth 3 (three) times daily.      folic acid (FOLVITE) 1 MG tablet Take 1 mg by mouth once daily.      furosemide (LASIX) 40 MG tablet Take 40 mg by mouth once daily.      HYDROcodone-acetaminophen (NORCO) 5-325 mg per tablet Take 1 tablet by mouth every 6 (six) hours as needed for Pain.      predniSONE (DELTASONE) 20 MG tablet prednisone 20 mg tablet      [DISCONTINUED] acetaminophen-codeine 300-30mg (TYLENOL #3) 300-30 mg Tab acetaminophen 300 mg-codeine 30 mg tablet      [DISCONTINUED] aspirin (ECOTRIN) 81 MG EC tablet Take 1 tablet (81 mg total) by mouth once daily.       No current facility-administered medications on file prior to visit.        Past Medical History:   Diagnosis Date    Allergy     Choledocholithiasis     Chronic gout of multiple sites 1/16/2019    CKD (chronic kidney disease), stage IV     CVA (cerebral vascular accident)     Encounter for blood transfusion     Gout     HTN (hypertension)     Iron overload     Iron  overload due to repeated red blood cell transfusions 1/16/2019    Sickle cell anemia     Sickle cell disease without crisis 1/16/2019     Past Surgical History:   Procedure Laterality Date    CHOLECYSTECTOMY      postoperative ERCP    ENDOSCOPIC ULTRASOUND OF UPPER GASTROINTESTINAL TRACT N/A 1/17/2019    Procedure: ULTRASOUND, ENDOSCOPIC, UPPER GI TRACT;  Surgeon: Dickson Coto MD;  Location: [address]);  Service: Endoscopy;  Laterality: N/A;    ERCP N/A 1/17/2019    Procedure: ERCP (ENDOSCOPIC RETROGRADE CHOLANGIOPANCREATOGRAPHY);  Surgeon: Dickson Coto MD;  Location: [facility] ([address] [place] [address]);  Service: Endoscopy;  Laterality: N/A;    ERCP N/A 3/21/2019    Procedure: ERCP (ENDOSCOPIC RETROGRADE CHOLANGIOPANCREATOGRAPHY);  Surgeon: Dickson Coto MD;  Location: [facility] ([address] [place] [address]);  Service: Endoscopy;  Laterality: N/A;    ruptured gastric ulcer with amy patch      TONSILLECTOMY      TUNNELED VENOUS PORT PLACEMENT       Family History   Problem Relation Age of Onset    Diabetes Mother     Diabetes Sister     Diabetes Maternal Grandmother     Heart disease Maternal Grandfather     Drug abuse Father        Social History     Socioeconomic History    Marital status: Single     Spouse name: Not on file    Number of children: Not on file    Years of education: Not on file    Highest education level: Not on file   Occupational History    Not on file   Social Needs    Financial resource strain: Not on file    Food insecurity:     Worry: Not on file     Inability: Not on file    Transportation needs:     Medical: Not on file     Non-medical: Not on file   Tobacco Use    Smoking status: Never Smoker    Smokeless tobacco: Never Used   Substance and Sexual Activity    Alcohol use: No     Frequency: Never    Drug use: No    Sexual activity: Not Currently     Partners: Male   Lifestyle    Physical activity:     Days per week: Not on file     Minutes per session: Not on file     Stress: Not on file   Relationships    Social connections:     Talks on phone: Not on file     Gets together: Not on file     Attends Evangelical service: Not on file     Active member of club or organization: Not on file     Attends meetings of clubs or organizations: Not on file     Relationship status: Not on file   Other Topics Concern    Not on file   Social History Narrative    Not on file          Review of Systems   Constitutional: Negative for chills and fever.   Respiratory: Negative for cough and sputum production.    Cardiovascular: Negative for chest pain, palpitations and leg swelling.   Gastrointestinal: Negative for abdominal pain, heartburn, nausea and vomiting.   Neurological: Negative for dizziness and weakness.   Endo/Heme/Allergies: Does not bruise/bleed easily.       OBJECTIVE:     There were no vitals filed for this visit.              Physical Exam:  Physical Exam   Constitutional: He is oriented to person, place, and time and well-developed, well-nourished, and in no distress.   HENT:   Head: Normocephalic and atraumatic.   Eyes: Pupils are equal, round, and reactive to light. EOM are normal.   Neck: Normal range of motion. Neck supple.   Cardiovascular: Normal rate, regular rhythm and normal heart sounds.   Pulmonary/Chest: Effort normal and breath sounds normal. No respiratory distress.   Abdominal: Soft. Bowel sounds are normal. He exhibits no distension.   Tunneled peritoneal dialysis catheter is present in left side of the abdomen with catheter coiled in left lower quadrant subcutaneous tissue   Musculoskeletal: Normal range of motion. He exhibits no edema.   Neurological: He is alert and oriented to person, place, and time.   Skin: Skin is warm and dry.   Psychiatric: Affect and judgment normal.           ASSESSMENT/PLAN:   Chronic kidney disease stage 4 preparing to start peritoneal dialysis  PLAN:  Exteriorization of previously placed tunneled peritoneal dialysis catheter will be  scheduled for next Thursday.  Procedure, risk and benefits discussed with patient and mother.

## 2020-05-07 DIAGNOSIS — Z76.82 ORGAN TRANSPLANT CANDIDATE: ICD-10-CM

## 2020-05-07 DIAGNOSIS — D68.59 PROTEIN C DEFICIENCY: Primary | ICD-10-CM

## 2020-05-07 DIAGNOSIS — D57.1 SICKLE CELL ANEMIA WITHOUT CRISIS: ICD-10-CM

## 2020-05-08 ENCOUNTER — TELEPHONE (OUTPATIENT)
Dept: TRANSPLANT | Facility: CLINIC | Age: 30
End: 2020-05-08

## 2020-05-08 LAB
BANDS: 2 % (ref 2–6)
BUN SERPL-MCNC: 107 MG/DL (ref 7–18)
BUN/CREAT SERPL: 24.42 RATIO
CALCIUM BLD-MCNC: NORMAL MG/DL
CALCIUM SERPL-MCNC: 8.5 MG/DL (ref 8.5–10.1)
CELLS COUNTED: 100
CHLORIDE SERPL-SCNC: 108 MMOL/L (ref 98–107)
CO2 SERPL-SCNC: 21 MMOL/L (ref 21–32)
COVID-19 AB, IGM: NORMAL
CREAT SERPL-MCNC: 4.38 MG/DL (ref 0.7–1.3)
ERYTHROCYTE [DISTWIDTH] IN BLOOD BY AUTOMATED COUNT: 19 % (ref 0–15.5)
GFR ESTIMATION: 19
GLUCOSE SERPL-MCNC: 150 MG/DL (ref 74–106)
GRANULOCYTES: 30.3 10*3/UL (ref 1.4–7)
HCT VFR BLD AUTO: 32.4 % (ref 42–52)
HGB BLD-MCNC: 10.4 G/DL (ref 14–18)
LYMPHOCYTES NFR BLD: 10 % (ref 20–45)
MCH RBC QN AUTO: 28.2 PG (ref 27–32)
MCHC RBC AUTO-ENTMCNC: 32.1 % (ref 32–36)
MCV RBC AUTO: 87.8 FL (ref 80–97)
MONOCYTES NFR BLD MANUAL: 5 % (ref 2–10)
NEUTROPHILS NFR BLD: 83 % (ref 50–80)
NUCLEATED RBC-MANUAL: 20 /100 WBC
PLATELET MORPHOLOGY: NORMAL
PLATELETS: 389 10*3/UL (ref 130–400)
POTASSIUM SERPL-SCNC: 4.3 MMOL/L (ref 3.5–5.1)
RBC # BLD AUTO: 3.69 10*6/UL (ref 4.7–6.1)
RBC MORPH BLD: ABNORMAL
SMALL PLATELETS BLD QL SMEAR: NORMAL
SODIUM BLD-SCNC: 137 MMOL/L (ref 131–143)
WBC # BLD: 35.6 10*3/UL (ref 4.5–10)

## 2020-05-08 NOTE — TELEPHONE ENCOUNTER
----- Message from J Carlos Tinajero RN sent at 5/8/2020  9:18 AM CDT -----  Contact: PT's Mother       ----- Message -----  From: Layne Metz  Sent: 5/8/2020   8:22 AM CDT  To: Kidney Waitlisted Coordinator    Called to speak to the PT's coordinator Bethany Yu. PT told his mom he recently spoke to someone about getting on the list but he doesn't have a good memory so he doesn't know who he spoke to. Please call back     Callback: [phone]

## 2020-05-08 NOTE — TELEPHONE ENCOUNTER
Spoke to mother who clarified that patient just didn't remember coordinator's name. He has a good memory otherwise. Reviewed that he is active on the wait list and will be seen in clinic every 6 months for closer monitoring of sickle cell and protein c deficiency.

## 2020-05-12 ENCOUNTER — OUTSIDE PLACE OF SERVICE (OUTPATIENT)
Dept: ADMINISTRATIVE | Facility: OTHER | Age: 30
End: 2020-05-12
Payer: MEDICARE

## 2020-05-12 PROCEDURE — 49436 PR CREATE EXIT SITE INTRAPERITONEAL CATH, DELAYED: ICD-10-PCS | Mod: ,,, | Performed by: SURGERY

## 2020-05-12 PROCEDURE — 49436 EMBEDDED IP CATH EXIT-SITE: CPT | Mod: ,,, | Performed by: SURGERY

## 2020-05-19 ENCOUNTER — TELEPHONE (OUTPATIENT)
Dept: TRANSPLANT | Facility: CLINIC | Age: 30
End: 2020-05-19

## 2020-05-20 ENCOUNTER — OFFICE VISIT (OUTPATIENT)
Dept: SURGERY | Facility: CLINIC | Age: 30
End: 2020-05-20
Payer: MEDICARE

## 2020-05-20 DIAGNOSIS — Z98.890 POST-OPERATIVE STATE: Primary | ICD-10-CM

## 2020-05-20 PROCEDURE — 99024 PR POST-OP FOLLOW-UP VISIT: ICD-10-PCS | Mod: NTX,S$GLB,POP, | Performed by: SURGERY

## 2020-05-20 PROCEDURE — 99024 POSTOP FOLLOW-UP VISIT: CPT | Mod: NTX,S$GLB,POP, | Performed by: SURGERY

## 2020-05-20 NOTE — PROGRESS NOTES
HPI:  Postop revisit status post exteriorization of tunneled peritoneal dialysis catheter.  Patient reports catheter functioning properly.    PHYSICAL EXAM:  Left lower quadrant incision is well-healed suture is removed  ASSESSMENT:    Stable  PLAN:      Revisit as needed

## 2020-07-23 ENCOUNTER — OFFICE VISIT (OUTPATIENT)
Dept: TRANSPLANT | Facility: CLINIC | Age: 30
End: 2020-07-23
Payer: MEDICARE

## 2020-07-23 ENCOUNTER — HOSPITAL ENCOUNTER (OUTPATIENT)
Dept: RADIOLOGY | Facility: HOSPITAL | Age: 30
Discharge: HOME | End: 2020-07-23
Attending: NURSE PRACTITIONER
Payer: MEDICARE

## 2020-07-23 ENCOUNTER — HOSPITAL ENCOUNTER (OUTPATIENT)
Dept: CARDIOLOGY | Facility: HOSPITAL | Age: 30
Discharge: HOME | End: 2020-07-23
Attending: NURSE PRACTITIONER
Payer: MEDICARE

## 2020-07-23 VITALS
WEIGHT: 159 LBS | DIASTOLIC BLOOD PRESSURE: 90 MMHG | BODY MASS INDEX: 24.96 KG/M2 | HEIGHT: 67 IN | SYSTOLIC BLOOD PRESSURE: 148 MMHG | HEART RATE: 90 BPM

## 2020-07-23 VITALS
HEART RATE: 76 BPM | HEIGHT: 67 IN | TEMPERATURE: 98 F | OXYGEN SATURATION: 100 % | RESPIRATION RATE: 18 BRPM | WEIGHT: 170.88 LBS | SYSTOLIC BLOOD PRESSURE: 129 MMHG | BODY MASS INDEX: 26.82 KG/M2 | DIASTOLIC BLOOD PRESSURE: 83 MMHG

## 2020-07-23 DIAGNOSIS — Z99.2 ESRD ON PERITONEAL DIALYSIS: ICD-10-CM

## 2020-07-23 DIAGNOSIS — Z76.82 PATIENT ON WAITING LIST FOR KIDNEY TRANSPLANT: Primary | ICD-10-CM

## 2020-07-23 DIAGNOSIS — Z76.82 ORGAN TRANSPLANT CANDIDATE: ICD-10-CM

## 2020-07-23 DIAGNOSIS — N18.9 ANEMIA SECONDARY TO RENAL FAILURE: ICD-10-CM

## 2020-07-23 DIAGNOSIS — N18.6 ESRD ON PERITONEAL DIALYSIS: ICD-10-CM

## 2020-07-23 DIAGNOSIS — D72.829 LEUKOCYTOSIS, UNSPECIFIED TYPE: ICD-10-CM

## 2020-07-23 DIAGNOSIS — N25.81 SECONDARY HYPERPARATHYROIDISM: ICD-10-CM

## 2020-07-23 DIAGNOSIS — E83.111 IRON OVERLOAD DUE TO REPEATED RED BLOOD CELL TRANSFUSIONS: ICD-10-CM

## 2020-07-23 DIAGNOSIS — D57.1 SICKLE CELL DISEASE WITHOUT CRISIS: ICD-10-CM

## 2020-07-23 DIAGNOSIS — Z86.73 HISTORY OF STROKE: ICD-10-CM

## 2020-07-23 DIAGNOSIS — D63.1 ANEMIA SECONDARY TO RENAL FAILURE: ICD-10-CM

## 2020-07-23 LAB
ASCENDING AORTA: 2.62 CM
AV INDEX (PROSTH): 0.81
AV MEAN GRADIENT: 5 MMHG
AV PEAK GRADIENT: 9 MMHG
AV VALVE AREA: 3.37 CM2
AV VELOCITY RATIO: 0.72
BSA FOR ECHO PROCEDURE: 1.85 M2
CV ECHO LV RWT: 0.47 CM
DOP CALC AO PEAK VEL: 1.51 M/S
DOP CALC AO VTI: 27.23 CM
DOP CALC LVOT AREA: 4.2 CM2
DOP CALC LVOT DIAMETER: 2.3 CM
DOP CALC LVOT PEAK VEL: 1.08 M/S
DOP CALC LVOT STROKE VOLUME: 91.73 CM3
DOP CALCLVOT PEAK VEL VTI: 22.09 CM
E WAVE DECELERATION TIME: 168.84 MSEC
E/A RATIO: 1.33
E/E' RATIO: 11.37 M/S
ECHO LV POSTERIOR WALL: 1.12 CM (ref 0.6–1.1)
FRACTIONAL SHORTENING: 36 % (ref 28–44)
INTERVENTRICULAR SEPTUM: 1.09 CM (ref 0.6–1.1)
LA MAJOR: 4.21 CM
LA MINOR: 4.29 CM
LA WIDTH: 3.14 CM
LEFT ATRIUM SIZE: 3.1 CM
LEFT ATRIUM VOLUME INDEX MOD: 26.2 ML/M2
LEFT ATRIUM VOLUME INDEX: 19.2 ML/M2
LEFT ATRIUM VOLUME MOD: 48 CM3
LEFT ATRIUM VOLUME: 35.16 CM3
LEFT INTERNAL DIMENSION IN SYSTOLE: 3.06 CM (ref 2.1–4)
LEFT VENTRICLE DIASTOLIC VOLUME INDEX: 58.9 ML/M2
LEFT VENTRICLE DIASTOLIC VOLUME: 108.03 ML
LEFT VENTRICLE MASS INDEX: 107 G/M2
LEFT VENTRICLE SYSTOLIC VOLUME INDEX: 20.1 ML/M2
LEFT VENTRICLE SYSTOLIC VOLUME: 36.88 ML
LEFT VENTRICULAR INTERNAL DIMENSION IN DIASTOLE: 4.81 CM (ref 3.5–6)
LEFT VENTRICULAR MASS: 195.84 G
LV LATERAL E/E' RATIO: 13.5 M/S
LV SEPTAL E/E' RATIO: 9.82 M/S
MV PEAK A VEL: 0.81 M/S
MV PEAK E VEL: 1.08 M/S
MV STENOSIS PRESSURE HALF TIME: 48.96 MS
MV VALVE AREA P 1/2 METHOD: 4.49 CM2
PISA TR MAX VEL: 2.23 M/S
RA MAJOR: 4.37 CM
RA PRESSURE: 3 MMHG
RA WIDTH: 2.82 CM
RIGHT VENTRICULAR END-DIASTOLIC DIMENSION: 3.17 CM
RV TISSUE DOPPLER FREE WALL SYSTOLIC VELOCITY 1 (APICAL 4 CHAMBER VIEW): 19.12 CM/S
SINUS: 3.21 CM
STJ: 2.57 CM
TDI LATERAL: 0.08 M/S
TDI SEPTAL: 0.11 M/S
TDI: 0.1 M/S
TR MAX PG: 20 MMHG
TRICUSPID ANNULAR PLANE SYSTOLIC EXCURSION: 2.66 CM
TV REST PULMONARY ARTERY PRESSURE: 23 MMHG

## 2020-07-23 PROCEDURE — 93306 TTE W/DOPPLER COMPLETE: CPT | Mod: 26,TXP,, | Performed by: INTERNAL MEDICINE

## 2020-07-23 PROCEDURE — 71046 X-RAY EXAM CHEST 2 VIEWS: CPT | Mod: 26,TXP,, | Performed by: RADIOLOGY

## 2020-07-23 PROCEDURE — 99215 PR OFFICE/OUTPT VISIT, EST, LEVL V, 40-54 MIN: ICD-10-PCS | Mod: S$PBB,TXP,, | Performed by: NURSE PRACTITIONER

## 2020-07-23 PROCEDURE — 93306 TTE W/DOPPLER COMPLETE: CPT | Mod: TXP

## 2020-07-23 PROCEDURE — 99999 PR PBB SHADOW E&M-EST. PATIENT-LVL IV: ICD-10-PCS | Mod: PBBFAC,TXP,, | Performed by: NURSE PRACTITIONER

## 2020-07-23 PROCEDURE — 71046 X-RAY EXAM CHEST 2 VIEWS: CPT | Mod: TC,FY,TXP

## 2020-07-23 PROCEDURE — 99999 PR PBB SHADOW E&M-EST. PATIENT-LVL IV: CPT | Mod: PBBFAC,TXP,, | Performed by: NURSE PRACTITIONER

## 2020-07-23 PROCEDURE — 99214 OFFICE O/P EST MOD 30 MIN: CPT | Mod: PBBFAC,25,TXP | Performed by: NURSE PRACTITIONER

## 2020-07-23 PROCEDURE — 99215 OFFICE O/P EST HI 40 MIN: CPT | Mod: S$PBB,TXP,, | Performed by: NURSE PRACTITIONER

## 2020-07-23 PROCEDURE — 93306 ECHO (CUPID ONLY): ICD-10-PCS | Mod: 26,TXP,, | Performed by: INTERNAL MEDICINE

## 2020-07-23 PROCEDURE — 71046 XR CHEST PA AND LATERAL: ICD-10-PCS | Mod: 26,TXP,, | Performed by: RADIOLOGY

## 2020-07-23 NOTE — LETTER
Date: 7/24/2020          Listed Patient      To: Dialysis Unit  and Charge RN From: Alexa Tanner, SHER, LMSW    RE: Ambrosio Newman, 1990, [id number]     At [facility], we conduct adherence checks as an important part of transplant care. Initial and listed patient assessments are not complete without adherence information.        Please complete the following information:     Current Dry Weight: ___________         Most Recent Pre-Treatment Weight: ___________ /date: _________                    Data from the last 3 months:  (data from last 3 months preferred):    Number of AMAs with dates, time, and reasons: ____________________________________________________    ______________________________________________________________________________________________    ______________________________________________________________________________________________    Number of No-Shows with dates and reasons: ______________________________________________________      ______________________________________________________________________________________________    Last intact PTH:  ___________/date: __________      Any concerns with Labs:  YES / NO      If yes, please explain:  ___________________________________________________________________________    ______________________________________________________________________________________________    Any concerns with Caregivers:  YES / NO    If yes, please explain:  ___________________________________________________________________________    ______________________________________________________________________________________________     Any concerns with Transportation:  YES / NO    If yes, please explain:  ___________________________________________________________________________    ______________________________________________________________________________________________    Any Psychiatric and/or Psychosocial  concerns:  YES / NO     If yes, please explain: ___________________________________________________________________________    ______________________________________________________________________________________________      PLEASE RETURN TO: FAX: [phone]     Thank you for collaborating with us in the care of this patient.           [address]  ?  [place] [address]  ?  phone [phone]  ?  fax [phone]  ?  [email or web address]  Confidentiality notice: The accompanying facsimile is intended solely for the use of the recipient designated above. Document(s) transmitted herewith may contain information that is confidential and privileged. Delivery, distribution or dissemination of this communication other than to the intended recipient is strictly prohibited. If you have received this facsimile in error, please notify us immediately by telephone.

## 2020-07-23 NOTE — PROGRESS NOTES
INITIAL PATIENT EDUCATION NOTE    Mr. Ambrosio Newman was seen in pre-kidney transplant clinic for evaluation for kidney, kidney/pancreas or pancreas only transplant.  The patient attended a an individual video education session that discussed/reviewed the following aspects of transplantation: evaluation and selection committee process, UNOS waitlist management/multiple listings, types of organs offered (KDPI < 85%, KDPI > 85%, PHS increased risk, DCD, HCV+, HIV+ for HIV+ recipients and enbloc/dual), financial aspects, surgical procedures, dietary instruction pre- and post-transplant, health maintenance pre- and post-transplant, post-transplant hospitalization and outpatient follow-up, potential to participate in a research protocol, and medication management and side effects.  A question and answer session was provided after the presentation.    The patient was seen by all members of the multi-disciplinary team to include: Nephrologist/PA, Surgeon, , Transplant Coordinator, , Pharmacist and Dietician (if applicable).    The patient reviewed and signed all consents for evaluation which were witnessed and sent to scanning into the Psychiatric chart.    The patient was given an education book and plan for further evaluation based on his individual assessment.      The patient was encouraged to call with any questions or concerns.

## 2020-07-23 NOTE — LETTER
July 24, 2020        Keaton Odom  [address]  [place] [address]  Phone: [phone]  Fax: [phone]             [facility]  [address]  [place] [address]  Phone: [phone]   Patient: Ambrosio Newman   MR Number: [id number]   YOB: 1990   Date of Visit: 7/23/2020       Dear Dr. Keaton Odom    Thank you for referring Ambrosio Newman to me for evaluation. Attached you will find relevant portions of my assessment and plan of care.    If you have questions, please do not hesitate to call me. I look forward to following Ambrosio Newman along with you.    Sincerely,    Minda Coleman, NP    Enclosure    If you would like to receive this communication electronically, please contact [email or web address] or [phone] to request [facility] [employer] access.    [employer] is a tool which provides read-only access to select patient information with whom you have a relationship. Its easy to use and provides real time access to review your patients record including encounter summaries, notes, results, and demographic information.    If you feel you have received this communication in error or would no longer like to receive these types of communications, please e-mail [email or web address]

## 2020-07-23 NOTE — PROGRESS NOTES
Kidney Transplant Recipient Reevalulation    Referring Physician: Keaton Odom  Current Nephrologist: Keaton Odom  Waitlist Status: active  Dialysis Start Date: (Not currently on dialysis)    Subjective:     CC:  Six month reassessment of kidney transplant candidacy.    HPI:  Mr. Newman is a 30 y.o. 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  He has been on the wait list for a kidney transplant at [facility] since 11/21/2019. Patient is currently on peritoneal dialysis started on ~5/2020. Patient is dialyzing on cyclic peritoneal dialysis.  Patient reports that he is tolerating dialysis well. . He has a PD catheter. Patient denies any recent hospitalizations or ED visits. Denies peritonitis, doing well.     Sickle cell anemia and chronic leukocytosis  Reports blood  Transfusions ~ every 4-5 weeks  Last transfusion last week  He follows with Dr GABY Posada in [place] / [facility] CVA    Residual -->LUE Weakness  Looks good. No problems transfering to the exam table      7/23/2020 CXR  FINDINGS:  Central venous catheter in the SVC.  The heart is not enlarged.  No significant airspace consolidation or pleural effusion identified.  Impression   Significant changes      7/23/2020 TTE   Conclusion  · Normal left ventricular systolic function. The estimated ejection fraction is 65%.  · Concentric left ventricular remodeling.  · Normal LV diastolic function.  · Normal right ventricular systolic function.  · Mild pulmonic regurgitation.  · Normal central venous pressure (3 mmHg).  · The estimated PA systolic pressure is 23 mmHg.              Past Medical History:   Diagnosis Date    Allergy     Choledocholithiasis     Chronic gout of multiple sites 1/16/2019    CKD (chronic kidney disease), stage IV     CVA (cerebral vascular accident)     Encounter for blood transfusion     Gout     HTN (hypertension)     Iron overload     Iron overload due to repeated red blood cell  "transfusions 1/16/2019    Sickle cell anemia     Sickle cell disease without crisis 1/16/2019       Review of Systems   Constitutional: Negative for activity change, appetite change, chills, fatigue, fever and unexpected weight change.   HENT: Negative for congestion, facial swelling, postnasal drip, rhinorrhea, sinus pressure, sore throat and trouble swallowing.    Eyes: Positive for visual disturbance. Negative for pain and redness.   Respiratory: Negative for cough, chest tightness, shortness of breath and wheezing.    Cardiovascular: Negative.  Negative for chest pain, palpitations and leg swelling.   Gastrointestinal: Negative for abdominal pain, diarrhea, nausea and vomiting.   Genitourinary: Negative for dysuria, flank pain and urgency.        Still makes a good amount of urine   Musculoskeletal: Positive for arthralgias and back pain. Negative for gait problem, neck pain and neck stiffness.        HX Gout    Skin: Negative for rash.   Allergic/Immunologic: Negative for environmental allergies, food allergies and immunocompromised state.   Neurological: Positive for weakness. Negative for dizziness, light-headedness and headaches.        LUE weakness   Psychiatric/Behavioral: Negative for agitation and confusion. The patient is not nervous/anxious.        Objective:   body mass index is 26.5 kg/m².  /83 (BP Location: Right arm, Patient Position: Sitting, BP Method: Medium (Automatic))   Pulse 76   Temp 98.3 °F (36.8 °C) (Oral)   Resp 18   Ht 5' 7.32" (1.71 m)   Wt 77.5 kg (170 lb 13.7 oz)   SpO2 100%   BMI 26.50 kg/m²     Physical Exam  Vitals signs reviewed.   Constitutional:       Appearance: Normal appearance. He is well-developed.   HENT:      Head: Normocephalic.   Eyes:      Pupils: Pupils are equal, round, and reactive to light.   Neck:      Musculoskeletal: Normal range of motion and neck supple.   Cardiovascular:      Rate and Rhythm: Normal rate and regular rhythm.      Heart sounds: " Normal heart sounds.   Pulmonary:      Effort: Pulmonary effort is normal.      Breath sounds: Normal breath sounds.   Abdominal:      General: Bowel sounds are normal.      Palpations: Abdomen is soft.       Musculoskeletal: Normal range of motion.   Skin:     General: Skin is warm and dry.   Neurological:      Mental Status: He is alert and oriented to person, place, and time.      Motor: No abnormal muscle tone.      Coordination: Coordination normal.   Psychiatric:         Behavior: Behavior normal.         Labs:  Lab Results   Component Value Date    WBC 16.32 (H) 07/23/2020    HGB 7.5 (L) 07/23/2020    HCT 25.3 (L) 07/23/2020    LABPLAT 389 05/08/2020     05/08/2020    K 4.3 05/08/2020     (H) 05/08/2020    CO2 21 05/08/2020    .0 (H) 05/08/2020    CREATININE 4.380 (H) 05/08/2020    EGFRNONAA 14.8 (A) 09/25/2019    CALCIUM 8.5 05/08/2020    PHOS 5.3 (H) 06/27/2019    MG 1.6 01/21/2019    ALBUMIN 3.7 09/25/2019    AST 40 09/25/2019    ALT 21 09/25/2019    .0 (H) 07/23/2020       Lab Results   Component Value Date    LIPASE 57 01/17/2019       No results found for: HLAABCTYPE    Lab Results   Component Value Date    CPRA 24 06/10/2020    FN6XPVF A23,A24 06/10/2020    CIABCLM WEAK B37 04/14/2020    CIIAB Negative 06/10/2020    ABCMT DP1 04/14/2020       Labs were reviewed with the patient.    Pre-transplant Workup:   Reviewed with the patient.    Assessment:     1. Patient on waiting list for kidney transplant    2. ESRD on peritoneal dialysis    3. Sickle cell disease without crisis    4. History of stroke    5. Leukocytosis, unspecified type    6. Anemia secondary to renal failure    7. Iron overload due to repeated red blood cell transfusions    8. Secondary hyperparathyroidism        Plan:     Fax labs to Dr Posada/ Pt's  hemomoc concerning  H/H and chronic leukocytosis mgmt  Fax labs to dialysis/nephrologist concerning  Elevated PTH and mgmt    Transplant Candidacy:   Mr. Newman is  a suitable kidney transplant candidate.  Meets center eligibility for accepting HCV+ donor offer - yes.  Patient educated on HCV+ donors. Ambrosio is willing  to accept HCV+ donor offer -  yes   Patient is a candidate for KDPI > 85 kidney donor offer - no /t age.  He remains in overall stable health, and will remain active on the transplant list.    Minda Coleman NP       Follow-up:   In addition to the tests noted in the plan, Mr. Newman will continue to have reevaluation as per the standing pre-kidney transplant protocol:  1. Monthly blood for PRA  2. Annual return to clinic, except HIV positive, > 65 years of age, or pancreas transplant candidates who will be scheduled to see transplant every 6 months while in pre-transplant phase  3. Annual re-testing: CXR, EKG, yearly mammograms for women over 40 and PSA for males over 40, cardiology follow-up as recommended by initial cardiology pre-transplant evaluation  4. Renal ultrasound every 2 years  5. Baseline colonoscopy after age 50 and repeated as recommended    UNOS Patient Status  Functional Status: 60% - Requires occasional assistance but is able to care for needs  Physical Capacity: No Limitations

## 2020-07-24 NOTE — PROGRESS NOTES
Transplant Recipient Adult Psychosocial Assessment-UPDATE    Ambrosio Sesarvielkasusannah  [address]  [place] [address]  Telephone Information:   Mobile [phone]   Home  [phone] (home)  Work  There is no work phone number on file.  E-mail  No e-mail address on record    Sex: male  YOB: 1990  Age: 30 y.o.    Encounter Date: 7/23/2020  [place] Citizen: yes  Primary Language: English   Needed: no    Emergency Contact:  Name: Dwaine Pitts   Relationship: mother  Address: same as pt   Phone Numbers:  [phone] (home), [phone] (work), [phone] (mobile)    Family/Social Support:   Number of dependents/: pt reports no minor dependents   Marital history: pt reports no marital history   Other family dynamics: pt presents with supportive mother. Pt resides with mother and supportive step-father Azeem. Pt also reports newly found step siblings and are trying to build a relationship.     Household Composition:  Name: Diane Pitts   Age: 50 and 58  Relationship: mother and step-father   Does person drive? yes      Do you and your caregivers have access to reliable transportation? yes  PRIMARY CAREGIVER: Donavan Pitts will be primary caregiver, phone number [phone].      provided in-depth information to patient and caregiver regarding pre- and post-transplant caregiver role.   strongly encourages patient and caregiver to have concrete plan regarding post-transplant care giving, including back-up caregiver(s) to ensure care giving needs are met as needed.    Patient and Caregiver states understanding all aspects of caregiver role/commitment and is able/willing/committed to being caregiver to the fullest extent necessary.    Patient and Caregiver verbalizes understanding of the education provided today and caregiver responsibilities.         remains available. Patient and Caregiver agree to contact  in a  "timely manner if concerns arise.      Able to take time off work without financial concerns: yes.     Additional Significant Others who will Assist with Transplant:  Name: Azeem Pitts   Age: 58  Number: [phone]  City: [place]  State: [place]  Relationship: step-father   Does person drive? yes      Living Will: no  Healthcare Power of : no pt reports trusting mother with medical decisions   Advance Directives on file: <<no information> per medical record.  Verbally reviewed LW/HCPA information.   provided patient with copy of LW/HCPA documents and provided education on completion of forms.    Living Donors: Yes.  Name: pt's mother reports pt's aunt is interested . Education and resource information given to patient.    Highest Education Level: High School (9-12) or GED  Reading Ability: 12th grade  Reports difficulty with: reading, comprehension and memory Pt's mother reports pt has always had issues with comprehension and memory. Pt's mother reports pt can be a poor historian regarding medical care. Pt denies but could not provide much history without mother's contribution. Pt's mother assist with medication and appointment management.   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    Spouse/Significant Other Employment: Pt denies     Disabled: yes: date disability began: 1990, due to: sickle cell .    Monthly Income:  Salary/Wages: $250 (at this time, pt is no longer recieving thsi income due to the current pandemic. Pt plans to return to work when "it's safe".   SS Disability: $1000  Able to afford all costs now and if transplanted, including medications: yes  Patient and Caregiver verbalizes understanding of personal responsibilities " related to transplant costs and the importance of having a financial plan to ensure that patients transplant costs are fully covered.       provided fundraising information/education. Patient and Caregiver verbalizes understanding.   remains available.    Insurance:   Payor/Plan Subscr  Sex Relation Sub. Ins. ID Effective Group Num   1. HU[employer] MANAGE* VASQUEZ HUNTER* 1990 Male  [id number] 1/1/18 [id number]                                   [address] [address]   2. [place]* VASQUEZ HUNTER* 1990 Male  [id number]* [id number]                                    [address] [address]     Primary Insurance (for UNOS reporting): Public Insurance - Medicare FFS (Fee For Service) (pt will not loose insurance due to disability determined at birth)  Secondary Insurance (for UNOS reporting): Public Insurance - Medicaid  Patient and Caregiver verbalizes clear understanding that patient may experience difficulty obtaining and/or be denied insurance coverage post-surgery. This includes and is not limited to disability insurance, life insurance, health insurance, burial insurance, long term care insurance, and other insurances.      Patient and Caregiver also reports understanding that future health concerns related to or unrelated to transplantation may not be covered by patient's insurance.  Resources and information provided and reviewed.     Patient and Caregiver provides verbal permission to release any necessary information to outside resources for patient care and discharge planning.  Resources and information provided are reviewed.      Dialysis Adherence: Patient reports he is currently utilizing PD treatments and is highly compliant. SW will fax adherence form.    Infusion Service: patient utilizing? no  Home Health: patient utilizing? no    DME: yes wheelchair,due to gout but does not currently utilize   Pulmonary/Cardiac Rehab: pt denies    ADLS:  Pt reports pt is independent with all ADLS  including driving, bathing,walking,taking medications, cooking, housekeeping, eating, and shopping.     Adherence:  Adherence education and counseling provided.     Per History Section:  Past Medical History:   Diagnosis Date    Allergy     Choledocholithiasis     Chronic gout of multiple sites 1/16/2019    CKD (chronic kidney disease), stage IV     CVA (cerebral vascular accident)     Encounter for blood transfusion     Gout     HTN (hypertension)     Iron overload     Iron overload due to repeated red blood cell transfusions 1/16/2019    Sickle cell anemia     Sickle cell disease without crisis 1/16/2019     Social History     Tobacco Use    Smoking status: Never Smoker    Smokeless tobacco: Never Used   Substance Use Topics    Alcohol use: No     Frequency: Never     Social History     Substance and Sexual Activity   Drug Use No     Social History     Substance and Sexual Activity   Sexual Activity Not Currently    Partners: Male       Per Today's Psychosocial:  Tobacco: none, patient denies any use.  Alcohol: none, patient denies any use.  Illicit Drugs/Non-prescribed Medications: none, patient denies any use.    Patient and Caregiver states clear understanding of the potential impact of substance use as it relates to transplant candidacy and is aware of possible random substance screening.  Substance abstinence/cessation counseling, education and resources provided and reviewed.     Arrests/DWI/Treatment/Rehab: patient denies    Psychiatric History:    Mental Health: Pt denies history of anxiety, depression and or overwhelming feelings of sadness at this time or in the past. Pt reports he is doing well, considering current state of the world.  Psychiatrist/Counselor: Pt denies currently or in the past and reports willingness to meet with psychiatry if required by transplant.   Medications:  Pt denies utilizing mental health medications currently or in the past  Suicide/Homicide Issues: Pt denies  feelings of wanting to harm self or other currently or in the past  Safety at home: Pt reports feeling safe at home.     Knowledge: Patient and Caregiver states having clear understanding and realistic expectations regarding the potential risks and potential benefits of organ transplantation and organ donation and agrees to discuss with health care team members and support system members, as well as to utilize available resources and express questions and/or concerns in order to further facilitate the pt informed decision-making.  Resources and information provided and reviewed.    Patient and Caregiver is aware of [facility]'s affiliation and/or partnership with agencies in home health care, LTAC, SNF, [facility], and other hospitals and clinics.    Understanding: Patient and Caregiver reports having a clear understanding of the many lifetime commitments involved with being a transplant recipient, including costs, compliance, medications, lab work, procedures, appointments, concrete and financial planning, preparedness, timely and appropriate communication of concerns, abstinence (ETOH, tobacco, illicit non-prescribed drugs), adherence to all health care team recommendations, support system and caregiver involvement, appropriate and timely resource utilization and follow-through, mental health counseling as needed/recommended, and patient and caregiver responsibilities.  Social Service Handbook, resources and detailed educational information provided and reviewed.  Educational information provided.    Patient and Caregiver also reports current and expected compliance with health care regime and states having a clear understanding of the importance of compliance.      Patient and Caregiver reports a clear understanding that risks and benefits may be involved with organ transplantation and with organ donation.       Patient and Caregiver also reports clear understanding that psychosocial risk factors may affect patient, and  include but are not limited to feelings of depression, generalized anxiety, anxiety regarding dependence on others, post traumatic stress disorder, feelings of guilt and other emotional and/or mental concerns, and/or exacerbation of existing mental health concerns.  Detailed resources provided and discussed.      Patient and Caregiver agrees to access appropriate resources in a timely manner as needed and/or as recommended, and to communicate concerns appropriately.  Patient and Caregiver also reports a clear understanding of treatment options available.     Patient and Caregiver received education in a group setting.   reviewed education, provided additional information, and answered questions.    Feelings or Concerns: Pt denies having any concerns and or overwhelming feelings regarding transplant at this time.     Coping: Pt reports coping adequately with transplant work up. Pt reports close relationships with mother and aunt.     Goals: Pt could not report any post transplant goals at this time.     Interview Behavior: Patient and Caregiver presents as alert and oriented x 4, pleasant, good eye contact, well groomed, recall good, concentration/judgement good, average intelligence, calm, communicative, cooperative and asking and answering questions appropriately. Per pt request, pt's mother was present during assessment. Pt's mother was highly engaged and motivated for transplant.          Transplant Social Work - Candidacy  Assessment/Plan:     Psychosocial Suitability: Patient presents as a suitable candidate for transplant at this time. Based on psychosocial risk factors, patient presents as low risk, due to absence of overwhelming psychsocial issues. However, pt will need long term care assistance due to difficuly comprehending medical information.     Recommendations/Additional Comments: SW recommends that pt conduct fundraising to assist pt with pay for cost of medications, food, gas, and other  transplant related needs. SW recommends that pt remain aware of potential mental health concerns and contact the team if any concerns arise. SW recommends that pt remain abstinent from tobacco, ETOH, and drug use. SW supports pt's continued dialysis adherence. SW remains available to answer any questions or concerns that arise as the pt moves through the transplant process.     Alexa Tanner, SHER, LMSW

## 2020-07-29 NOTE — PROGRESS NOTES
YEARLY LIST MANAGEMENT NOTE    Ambrosio Kabavielkasusannah's kidney transplant listing status reviewed.  Patient is due for follow-up appointments on 1/23/21.  Appointments will be scheduled per protocol.

## 2020-08-18 ENCOUNTER — TELEPHONE (OUTPATIENT)
Dept: HEMATOLOGY/ONCOLOGY | Facility: CLINIC | Age: 30
End: 2020-08-18

## 2020-08-18 NOTE — TELEPHONE ENCOUNTER
"----- Message from Rashmi Haines sent at 8/18/2020  1:55 PM CDT -----  Regarding: Questions  Patient Assist    Name of caller: Mother   Provider name:  Monroe Lopez MD   Contact Preference:  [phone]  Current patient or new patient?: current   Does Patient feel the need to see the MD today?  No   What is the nature of the call?    -  called with many questions about the pt being scheduled for bmt  He's feeling fine, no issues to report. Pt had consult , but it was last   year and pt hasn't seen the MD at all since then. Please call and assist   his Mother with questions and if bmx is really what he wants to do.     Additional Notes:   "Thank you for all that you do for our patients'"          "

## 2020-08-19 ENCOUNTER — TELEPHONE (OUTPATIENT)
Dept: HEMATOLOGY/ONCOLOGY | Facility: CLINIC | Age: 30
End: 2020-08-19

## 2020-08-19 NOTE — TELEPHONE ENCOUNTER
Patients mother called stating she would like to discuss bmt as an option for patient sickle cell disease. Aided pt mom in setting up a the patient portal so that she can conduct a virtual visit with a provider, as pt lives in [place].

## 2020-08-19 NOTE — TELEPHONE ENCOUNTER
----- Message from Dasia Vickers sent at 8/19/2020  4:45 PM CDT -----  Contact: Dwaine (mother)  Returning a call     Caller name::Dwaine (mother)    Who Left Message for Patient:: Soni    Communication preference:: [phone]    Additional info::

## 2020-08-20 ENCOUNTER — TELEPHONE (OUTPATIENT)
Dept: HEMATOLOGY/ONCOLOGY | Facility: CLINIC | Age: 30
End: 2020-08-20

## 2020-08-20 NOTE — TELEPHONE ENCOUNTER
"----- Message from Rashmi Haines sent at 8/20/2020 12:11 PM CDT -----  Regarding: Virtual Scheduling  Patient Assist    Name of caller:  Dwaine   Provider name: Monroe Lopez MD  Contact Preference:  [phone]  Current patient or new patient?:  established  Does Patient feel the need to see the MD today?  No   What is the nature of the call?    - alisia was set and ready for a virtual visit. Called in asking to   consult with the RN     Additional Notes:   "Thank you for all that you do for our patients'"          "

## 2020-08-20 NOTE — TELEPHONE ENCOUNTER
Set patient up with virtual visit to discuss treatment options for sickle cell disease. Educated pt mother on how to access virtual visit

## 2020-09-02 ENCOUNTER — OFFICE VISIT (OUTPATIENT)
Dept: HEMATOLOGY/ONCOLOGY | Facility: CLINIC | Age: 30
End: 2020-09-02
Payer: MEDICARE

## 2020-09-02 DIAGNOSIS — N18.9 ANEMIA SECONDARY TO RENAL FAILURE: ICD-10-CM

## 2020-09-02 DIAGNOSIS — Z76.82 PATIENT ON WAITING LIST FOR KIDNEY TRANSPLANT: ICD-10-CM

## 2020-09-02 DIAGNOSIS — N18.4 CKD (CHRONIC KIDNEY DISEASE), STAGE IV: ICD-10-CM

## 2020-09-02 DIAGNOSIS — D57.1 HB-SS DISEASE WITHOUT CRISIS: ICD-10-CM

## 2020-09-02 DIAGNOSIS — D63.1 ANEMIA SECONDARY TO RENAL FAILURE: ICD-10-CM

## 2020-09-02 DIAGNOSIS — Z86.73 HISTORY OF STROKE: ICD-10-CM

## 2020-09-02 DIAGNOSIS — Z76.82 STEM CELL TRANSPLANT CANDIDATE: Primary | ICD-10-CM

## 2020-09-02 PROCEDURE — 99213 PR OFFICE/OUTPT VISIT, EST, LEVL III, 20-29 MIN: ICD-10-PCS | Mod: 95,,, | Performed by: INTERNAL MEDICINE

## 2020-09-02 PROCEDURE — 99213 OFFICE O/P EST LOW 20 MIN: CPT | Mod: 95,,, | Performed by: INTERNAL MEDICINE

## 2020-09-02 NOTE — PROGRESS NOTES
CC: Sickle cell disease, virtual follow up     HPI: Mr. 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 he is followed by Dr. Mercy Posada in [place].    Etiology of renal disease is presumed to be sickle cell anemia. He is currently being evaluated for a renal transplant here. He is pre-dialysis at this time.  He is currently taking Endari ( L Glutamine). He has been receiving blood transfusions intermittently. He has not had pain crises in the past 1 year.   He is with his mother.    Interval History: He is here for virtual follow up. He is doing well. He is on peritoneal dialysis.       Review of Systems   Constitutional: Positive for weight loss. Negative for chills, fever and malaise/fatigue.   HENT: Negative for ear discharge, hearing loss and nosebleeds.    Eyes: Negative for blurred vision, double vision and discharge.   Respiratory: Negative for cough and hemoptysis.    Cardiovascular: Negative for chest pain, palpitations, orthopnea and PND.   Gastrointestinal: Negative for diarrhea, heartburn, nausea and vomiting.   Genitourinary: Negative for dysuria.   Musculoskeletal: Negative for back pain and myalgias.   Neurological: Negative for dizziness, tingling, tremors, sensory change, focal weakness, weakness and headaches.   Endo/Heme/Allergies: Does not bruise/bleed easily.   Psychiatric/Behavioral: Negative for depression and suicidal ideas.          Current Outpatient Medications   Medication Sig    carvedilol (COREG) 12.5 MG tablet carvedilol 12.5 mg tablet    colchicine (COLCRYS) 0.6 mg tablet colchicine 0.6 mg tablet    deferiprone 500 mg Tab Take 500 mg by mouth 3 (three) times daily.    folic acid (FOLVITE) 1 MG tablet Take 1 mg by mouth once daily.    furosemide (LASIX) 40 MG tablet Take 40 mg by mouth once daily.    HYDROcodone-acetaminophen  (NORCO) 5-325 mg per tablet Take 1 tablet by mouth every 6 (six) hours as needed for Pain.    patiromer calcium sorbitex (VELTASSA) 8.4 gram PwPk Take by mouth.    predniSONE (DELTASONE) 20 MG tablet prednisone 20 mg tablet    sodium bicarbonate 650 MG tablet Take 650 mg by mouth.     No current facility-administered medications for this visit.         Assessment:     1. Sickle cell disease  2. Stage V CKD  3. H/o gout  4. Transfusion hemosiderosis  5. Protein C deficiency  6. Stem cell transplant candidacy      Plan:     1,2: his CKD has been attributed to his SCD. He has been evaluated by transplant clinic here for renal transplant. It has been deemed to be riskier than transplants for patients without SCD.    He is on peritoneal dialysis at this time. He has not had recent pain crises or hospitalizations.     6. Evidence of comprehensive benefits of  allogeneic SCT in sickle cell disease in adults is not established. He has several co-morbidities, including ESRD, possible upcoming renal transplant, h/o CVA, that makes allogeneic stem cell transplant very high risk. He does not have a full sibling. His mother is the only related, possible haplo-identical donor.  I referred him to [facility] transplant center for another opinion .

## 2020-09-04 ENCOUNTER — TELEPHONE (OUTPATIENT)
Dept: HEMATOLOGY/ONCOLOGY | Facility: CLINIC | Age: 30
End: 2020-09-04

## 2020-09-04 NOTE — TELEPHONE ENCOUNTER
----- Message from Dasia Vickers sent at 9/4/2020 11:42 AM CDT -----  Contact: MD Navarrete  Patient Advice/Staff Message     Caller name/title: zi    Provider: [place]    Reason for call: Calling to speak with RN regarding referral, say MD Navarrete doesn't take out of state medicaid. Need to update insurance info.    Do you feel you need to be seen today::No        Communication Preference: [phone]    Additional Information:

## 2020-10-28 ENCOUNTER — OFFICE VISIT (OUTPATIENT)
Dept: SURGERY | Facility: CLINIC | Age: 30
End: 2020-10-28
Payer: MEDICARE

## 2020-10-28 DIAGNOSIS — T85.691A: Primary | ICD-10-CM

## 2020-10-28 DIAGNOSIS — N18.6 END STAGE RENAL DISEASE: ICD-10-CM

## 2020-10-28 PROCEDURE — 99213 OFFICE O/P EST LOW 20 MIN: CPT | Mod: NTX,S$GLB,, | Performed by: SURGERY

## 2020-10-28 PROCEDURE — 99213 PR OFFICE/OUTPT VISIT, EST, LEVL III, 20-29 MIN: ICD-10-PCS | Mod: NTX,S$GLB,, | Performed by: SURGERY

## 2020-10-28 NOTE — PROGRESS NOTES
History & Physical    SUBJECTIVE:     History of Present Illness:    30-year-old male with end-stage renal disease on peritoneal dialysis presents with nonfunctioning peritoneal dialysis catheter for the last several weeks.  Apparently he had episodes of peritonitis over the last several weeks and was being treated with antibiotics but now the catheter wound even allow any infusion of fluid.  The catheter had a small nick in it per the patient as he tried to cut some tape off of it and was repaired while he was in [place].    Chief Complaint   Patient presents with    Other     non-functioning PD cath         Review of patient's allergies indicates:  Review of patient's allergies indicates:   Allergen Reactions    Bactrim [sulfamethoxazole-trimethoprim] Hives and Rash       Current Outpatient Medications on File Prior to Visit   Medication Sig Dispense Refill    carvedilol (COREG) 12.5 MG tablet carvedilol 12.5 mg tablet      colchicine (COLCRYS) 0.6 mg tablet colchicine 0.6 mg tablet      deferiprone 500 mg Tab Take 500 mg by mouth 3 (three) times daily.      furosemide (LASIX) 40 MG tablet Take 40 mg by mouth once daily.      HYDROcodone-acetaminophen (NORCO) 5-325 mg per tablet Take 1 tablet by mouth every 6 (six) hours as needed for Pain.      [DISCONTINUED] folic acid (FOLVITE) 1 MG tablet Take 1 mg by mouth once daily.      [DISCONTINUED] patiromer calcium sorbitex (VELTASSA) 8.4 gram PwPk Take by mouth.      [DISCONTINUED] predniSONE (DELTASONE) 20 MG tablet prednisone 20 mg tablet      [DISCONTINUED] sodium bicarbonate 650 MG tablet Take 650 mg by mouth.       No current facility-administered medications on file prior to visit.        Past Medical History:   Diagnosis Date    Allergy     Choledocholithiasis     Chronic gout of multiple sites 1/16/2019    CKD (chronic kidney disease), stage IV     CVA (cerebral vascular accident)     Encounter for blood transfusion     Gout     HTN  (hypertension)     Iron overload     Iron overload due to repeated red blood cell transfusions 1/16/2019    Sickle cell anemia     Sickle cell disease without crisis 1/16/2019     Past Surgical History:   Procedure Laterality Date    CHOLECYSTECTOMY      postoperative ERCP    ENDOSCOPIC ULTRASOUND OF UPPER GASTROINTESTINAL TRACT N/A 1/17/2019    Procedure: ULTRASOUND, ENDOSCOPIC, UPPER GI TRACT;  Surgeon: Dickson Coto MD;  Location: [facility] ([address] [place] [address]);  Service: Endoscopy;  Laterality: N/A;    ERCP N/A 1/17/2019    Procedure: ERCP (ENDOSCOPIC RETROGRADE CHOLANGIOPANCREATOGRAPHY);  Surgeon: Dickson Coto MD;  Location: [facility] ([address] [place] [address]);  Service: Endoscopy;  Laterality: N/A;    ERCP N/A 3/21/2019    Procedure: ERCP (ENDOSCOPIC RETROGRADE CHOLANGIOPANCREATOGRAPHY);  Surgeon: Dickson Coto MD;  Location: [facility] ([address] [place] [address]);  Service: Endoscopy;  Laterality: N/A;    ruptured gastric ulcer with amy patch      TONSILLECTOMY      TUNNELED VENOUS PORT PLACEMENT       Family History   Problem Relation Age of Onset    Diabetes Mother     Diabetes Sister     Diabetes Maternal Grandmother     Heart disease Maternal Grandfather     Drug abuse Father        Social History     Socioeconomic History    Marital status: Single     Spouse name: Not on file    Number of children: Not on file    Years of education: Not on file    Highest education level: Not on file   Occupational History    Not on file   Social Needs    Financial resource strain: Not on file    Food insecurity     Worry: Not on file     Inability: Not on file    Transportation needs     Medical: Not on file     Non-medical: Not on file   Tobacco Use    Smoking status: Never Smoker    Smokeless tobacco: Never Used   Substance and Sexual Activity    Alcohol use: No     Frequency: Never    Drug use: No    Sexual activity: Not Currently     Partners: Male   Lifestyle    Physical activity     Days per week: Not on file      Minutes per session: Not on file    Stress: Not on file   Relationships    Social connections     Talks on phone: Not on file     Gets together: Not on file     Attends Anabaptism service: Not on file     Active member of club or organization: Not on file     Attends meetings of clubs or organizations: Not on file     Relationship status: Not on file   Other Topics Concern    Not on file   Social History Narrative    Not on file          Review of Systems   Constitutional: Negative.    Respiratory: Negative.    Cardiovascular: Negative.    Gastrointestinal: Positive for abdominal pain.   Genitourinary: Negative.    Musculoskeletal: Negative.    Neurological: Negative.        OBJECTIVE:     There were no vitals filed for this visit.              Physical Exam:  Physical Exam   Constitutional: He is oriented to person, place, and time and well-developed, well-nourished, and in no distress.   HENT:   Head: Normocephalic and atraumatic.   Neck: Normal range of motion. Neck supple.   Cardiovascular: Normal rate and regular rhythm.   Pulmonary/Chest: Effort normal and breath sounds normal.   Abdominal: Soft. Bowel sounds are normal.   Peritoneal catheter noted exiting left lower quadrant of the abdomen.  There is a well-healed healed midline incision in the upper abdomen.   Musculoskeletal: Normal range of motion.   Neurological: He is alert and oriented to person, place, and time.   Skin: Skin is warm and dry.   Psychiatric: Affect and judgment normal.           ASSESSMENT/PLAN:   Nonfunctioning peritoneal dialysis catheter likely related to several episodes of peritonitis.  PLAN:  Will attempt to perform diagnostic laparoscopy with manipulation of peritoneal dialysis catheter.  The catheter cannot become functional will remove the peritoneal dialysis catheter that time.  Surgery scheduled for next Thursday

## 2020-11-02 LAB
ANION GAP SERPL CALC-SCNC: 15 MMOL/L (ref 3–11)
BUN SERPL-MCNC: 128 MG/DL (ref 7–18)
BUN/CREAT SERPL: 12.22 RATIO (ref 7–18)
CALCIUM BLD-MCNC: NEGATIVE MG/DL
CALCIUM SERPL-MCNC: 8.8 MG/DL (ref 8.8–10.5)
CHLORIDE SERPL-SCNC: 101 MMOL/L (ref 100–108)
CO2 SERPL-SCNC: 16 MMOL/L (ref 21–32)
COVID-19 AB, IGM: NEGATIVE
CREAT SERPL-MCNC: 10.47 MG/DL (ref 0.7–1.3)
ERYTHROCYTE [DISTWIDTH] IN BLOOD BY AUTOMATED COUNT: 21.3 % (ref 12.5–18)
GFR ESTIMATION: 7
GLUCOSE SERPL-MCNC: 92 MG/DL (ref 70–110)
HCT VFR BLD AUTO: 25.6 % (ref 42–52)
HGB BLD-MCNC: 8.5 G/DL (ref 14–18)
MCH RBC QN AUTO: 28.4 PG (ref 27–31.2)
MCHC RBC AUTO-ENTMCNC: 33.2 G/DL (ref 31.8–35.4)
MCV RBC AUTO: 85.6 FL (ref 80–97)
NUCLEATED RED BLOOD CELLS: 0.3 %
PATIENT EMPLOYED IN HEALTHCARE: NO
PATIENT HAS SYMPTOMS FOR CONDITION OF INTEREST: NO
PATIENT HOSPITALIZED BC COND: NO
PATIENT IN CONGREGATE CARE: NO
PLATELETS: 119 10*3/UL (ref 142–424)
POTASSIUM SERPL-SCNC: 6.1 MMOL/L (ref 3.6–5.2)
RBC # BLD AUTO: 2.99 10*6/UL (ref 4.7–6.1)
SODIUM BLD-SCNC: 132 MMOL/L (ref 135–145)
WBC # BLD: 13.5 10*3/UL (ref 4.6–10.2)

## 2020-11-05 ENCOUNTER — OUTSIDE PLACE OF SERVICE (OUTPATIENT)
Dept: ADMINISTRATIVE | Facility: OTHER | Age: 30
End: 2020-11-05
Payer: MEDICARE

## 2020-11-05 LAB — POTASSIUM SERPL-SCNC: 4.5 MMOL/L (ref 3.6–5.2)

## 2020-11-05 PROCEDURE — 49320 DIAG LAPARO SEPARATE PROC: CPT | Mod: ,,, | Performed by: SURGERY

## 2020-11-05 PROCEDURE — 49320 PR LAP,DIAGNOSTIC ABDOMEN: ICD-10-PCS | Mod: ,,, | Performed by: SURGERY

## 2020-11-09 ENCOUNTER — PATIENT MESSAGE (OUTPATIENT)
Dept: SURGERY | Facility: CLINIC | Age: 30
End: 2020-11-09

## 2020-11-09 ENCOUNTER — TELEPHONE (OUTPATIENT)
Dept: SURGERY | Facility: CLINIC | Age: 30
End: 2020-11-09

## 2020-11-09 NOTE — TELEPHONE ENCOUNTER
"Left message with no response. Attempted to call two additional times. The call connects, but with only background noise.Nobody answers as I repeatedly said, "Hello".  ----- Message from Mariangel Brvao sent at 11/9/2020 11:37 AM CST -----  Patient running fever need to speak to nurse . Call back number [phone]. Tks       "

## 2020-11-14 ENCOUNTER — TELEPHONE (OUTPATIENT)
Dept: TRANSPLANT | Facility: CLINIC | Age: 30
End: 2020-11-14

## 2020-11-16 ENCOUNTER — OFFICE VISIT (OUTPATIENT)
Dept: SURGERY | Facility: CLINIC | Age: 30
End: 2020-11-16
Payer: MEDICARE

## 2020-11-16 DIAGNOSIS — Z98.890 POST-OPERATIVE STATE: ICD-10-CM

## 2020-11-16 DIAGNOSIS — T85.691A: Primary | ICD-10-CM

## 2020-11-16 LAB
ANION GAP SERPL CALC-SCNC: 14 MMOL/L (ref 3–11)
ANISOCYTOSIS: ABNORMAL
BUN SERPL-MCNC: 123 MG/DL (ref 7–18)
BUN/CREAT SERPL: 15.45 RATIO (ref 7–18)
CALCIUM BLD-MCNC: NEGATIVE MG/DL
CALCIUM SERPL-MCNC: 8.1 MG/DL (ref 8.8–10.5)
CELLS COUNTED: 100
CHLORIDE SERPL-SCNC: 99 MMOL/L (ref 100–108)
CO2 SERPL-SCNC: 20 MMOL/L (ref 21–32)
COVID-19 AB, IGM: NEGATIVE
CREAT SERPL-MCNC: 7.96 MG/DL (ref 0.7–1.3)
EOSINOPHIL NFR BLD: 8 % (ref 1–3)
ERYTHROCYTE [DISTWIDTH] IN BLOOD BY AUTOMATED COUNT: 21.6 % (ref 12.5–18)
GFR ESTIMATION: 10
GLUCOSE SERPL-MCNC: 106 MG/DL (ref 70–110)
HCT VFR BLD AUTO: 18.1 % (ref 42–52)
HGB BLD-MCNC: 6 G/DL (ref 14–18)
HYPOCHROMIA BLD QL SMEAR: ABNORMAL
LYMPHOCYTES NFR BLD: 11 % (ref 25–40)
MCH RBC QN AUTO: 28.2 PG (ref 27–31.2)
MCHC RBC AUTO-ENTMCNC: 33.1 G/DL (ref 31.8–35.4)
MCV RBC AUTO: 85 FL (ref 80–97)
MONOCYTES NFR BLD: 11 % (ref 1–15)
NEUTROPHILS # BLD AUTO: 9.1 10*3/UL (ref 1.8–7.7)
NEUTROPHILS NFR BLD: 70 % (ref 37–80)
NUCLEATED RED BLOOD CELLS: 3.2 %
PATIENT EMPLOYED IN HEALTHCARE: NO
PATIENT HAS SYMPTOMS FOR CONDITION OF INTEREST: NO
PATIENT HOSPITALIZED BC COND: NO
PATIENT IN CONGREGATE CARE: NO
PLATELETS: 450 10*3/UL (ref 142–424)
POTASSIUM SERPL-SCNC: 5.6 MMOL/L (ref 3.6–5.2)
RBC # BLD AUTO: 2.13 10*6/UL (ref 4.7–6.1)
SICKLE CELLS: ABNORMAL
SMALL PLATELETS BLD QL SMEAR: ADEQUATE
SODIUM BLD-SCNC: 133 MMOL/L (ref 135–145)
TARGETS: ABNORMAL
WBC # BLD: 13 10*3/UL (ref 4.6–10.2)

## 2020-11-16 PROCEDURE — 99024 PR POST-OP FOLLOW-UP VISIT: ICD-10-PCS | Mod: NTX,S$GLB,POP, | Performed by: SURGERY

## 2020-11-16 PROCEDURE — 99024 POSTOP FOLLOW-UP VISIT: CPT | Mod: NTX,S$GLB,POP, | Performed by: SURGERY

## 2020-11-16 NOTE — PROGRESS NOTES
HPI:  Revisit today status post laparoscopic manipulation of peritoneal dialysis catheter.  Patient states peritoneal catheter is not working still.  He has not been dialyzed I think for weeks.    PHYSICAL EXAM:  Laparoscopic incisions are healing well and sutures are removed.  Abdomen is soft and nontender  ASSESSMENT:    Nonfunctioning peritoneal dialysis catheter despite procedure performed last week.  PLAN:    I think the best plan would be just to remove this peritoneal dialysis catheter and then plan to reinsert a new 1 in a couple of weeks.  In the interim patient will need a temporary venous catheter for temporary hemodialysis.  I think he needs to have hemodialysis prior to his surgical procedure this Thursday since he has not been dialyzed in several weeks.  We will contact the dialysis unit to coordinate this.  Removal of peritoneal dialysis catheter scheduled for this Thursday

## 2020-12-02 ENCOUNTER — OFFICE VISIT (OUTPATIENT)
Dept: SURGERY | Facility: CLINIC | Age: 30
End: 2020-12-02
Payer: MEDICARE

## 2020-12-02 DIAGNOSIS — N18.6 END STAGE RENAL DISEASE: ICD-10-CM

## 2020-12-02 DIAGNOSIS — T85.691S: Primary | ICD-10-CM

## 2020-12-02 PROCEDURE — 99212 PR OFFICE/OUTPT VISIT, EST, LEVL II, 10-19 MIN: ICD-10-PCS | Mod: NTX,S$GLB,, | Performed by: SURGERY

## 2020-12-02 PROCEDURE — 99212 OFFICE O/P EST SF 10 MIN: CPT | Mod: NTX,S$GLB,, | Performed by: SURGERY

## 2020-12-02 NOTE — PROGRESS NOTES
HPI:  Ambrosio needs his peritoneal dialysis catheter removed that is nonfunctional due to recent bout of peritonitis.  We had him scheduled before Thanksgiving but this was postponed so that he could get a temporary catheter in start hemodialysis in the interim.  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  He is here today to reschedule his surgery    PHYSICAL EXAM:  Catheter in left side of the abdomen.  Abdomen nontender  ASSESSMENT:    Nonfunctional peritoneal dialysis catheter, end-stage renal disease now on temporary hemodialysis  PLAN:      Plan removal of peritoneal dialysis catheter next Tuesday December 8th.  Patient previously consented and preopped

## 2020-12-04 LAB
ANION GAP SERPL CALC-SCNC: 9 MMOL/L (ref 3–11)
ANISOCYTOSIS: ABNORMAL
BUN SERPL-MCNC: 31 MG/DL (ref 7–18)
BUN/CREAT SERPL: 5.31 RATIO (ref 7–18)
CALCIUM BLD-MCNC: NEGATIVE MG/DL
CALCIUM SERPL-MCNC: 9.1 MG/DL (ref 8.8–10.5)
CELLS COUNTED: 100
CHLORIDE SERPL-SCNC: 99 MMOL/L (ref 100–108)
CO2 SERPL-SCNC: 30 MMOL/L (ref 21–32)
COVID-19 AB, IGM: NEGATIVE
CREAT SERPL-MCNC: 5.83 MG/DL (ref 0.7–1.3)
EOSINOPHIL NFR BLD: 3 % (ref 1–3)
ERYTHROCYTE [DISTWIDTH] IN BLOOD BY AUTOMATED COUNT: 19.3 % (ref 12.5–18)
GFR ESTIMATION: 14
GLUCOSE SERPL-MCNC: 102 MG/DL (ref 70–110)
HCT VFR BLD AUTO: 32.7 % (ref 42–52)
HGB BLD-MCNC: 10.6 G/DL (ref 14–18)
LYMPHOCYTES NFR BLD: 22 % (ref 25–40)
MCH RBC QN AUTO: 29.5 PG (ref 27–31.2)
MCHC RBC AUTO-ENTMCNC: 32.4 G/DL (ref 31.8–35.4)
MCV RBC AUTO: 91.1 FL (ref 80–97)
MONOCYTES NFR BLD: 10 % (ref 1–15)
NEUTROPHILS # BLD AUTO: 14.3 10*3/UL (ref 1.8–7.7)
NEUTROPHILS NFR BLD: 65 % (ref 37–80)
NUCLEATED RED BLOOD CELLS: 4.4 %
PATIENT EMPLOYED IN HEALTHCARE: NO
PATIENT HAS SYMPTOMS FOR CONDITION OF INTEREST: NO
PATIENT HOSPITALIZED BC COND: NO
PATIENT IN CONGREGATE CARE: NO
PLATELETS: 201 10*3/UL (ref 142–424)
POTASSIUM SERPL-SCNC: 3.2 MMOL/L (ref 3.6–5.2)
RBC # BLD AUTO: 3.59 10*6/UL (ref 4.7–6.1)
SMALL PLATELETS BLD QL SMEAR: NORMAL
SODIUM BLD-SCNC: 138 MMOL/L (ref 135–145)
WBC # BLD: 22 10*3/UL (ref 4.6–10.2)

## 2020-12-08 ENCOUNTER — OUTSIDE PLACE OF SERVICE (OUTPATIENT)
Dept: ADMINISTRATIVE | Facility: OTHER | Age: 30
End: 2020-12-08
Payer: MEDICARE

## 2020-12-08 LAB — POTASSIUM SERPL-SCNC: 3.3 MMOL/L (ref 3.6–5.2)

## 2020-12-08 PROCEDURE — 49422 REMOVE TUNNELED IP CATH: CPT | Mod: TXP,,, | Performed by: SURGERY

## 2020-12-08 PROCEDURE — 49422 PR REMOVAL TUNNELED INTRAPERITONEAL CATHETER: ICD-10-PCS | Mod: TXP,,, | Performed by: SURGERY

## 2020-12-16 ENCOUNTER — OFFICE VISIT (OUTPATIENT)
Dept: SURGERY | Facility: CLINIC | Age: 30
End: 2020-12-16
Payer: MEDICARE

## 2020-12-16 DIAGNOSIS — Z98.890 POST-OPERATIVE STATE: Primary | ICD-10-CM

## 2020-12-16 PROCEDURE — 99024 POSTOP FOLLOW-UP VISIT: CPT | Mod: NTX,S$GLB,POP, | Performed by: SURGERY

## 2020-12-16 PROCEDURE — 99024 PR POST-OP FOLLOW-UP VISIT: ICD-10-PCS | Mod: NTX,S$GLB,POP, | Performed by: SURGERY

## 2020-12-16 NOTE — PROGRESS NOTES
HPI:  Postop revisit status post removal of tunneled peritoneal dialysis catheter in left abdomen.    PHYSICAL EXAM:  Examination of the operative site shows some swelling in the left lower quadrant subcutaneous tissues likely has underlying hematoma.  The incisions are closed with no drainage or redness noted.  Staples are removed and Steri-Strips applied  ASSESSMENT:    Probable subcutaneous postoperative hematoma left abdominal wall  PLAN:     Monitor.  Revisit as needed.  So we can try to place a new peritoneal dialysis catheter probably toward the end of January

## 2020-12-17 ENCOUNTER — NURSE TRIAGE (OUTPATIENT)
Dept: ADMINISTRATIVE | Facility: CLINIC | Age: 30
End: 2020-12-17

## 2020-12-17 NOTE — TELEPHONE ENCOUNTER
OCC Rn Patient calling said  Took out staples yesterday.   Last night the incision starting bleeding. Off and on.   Advised to apply sterile gauze and apply pressure to the incision that is actively bleeding at this time.   It was bleeding off and on last night that just required a Band-Aid.  Now, is continuous bleeding. Now, there is a hole where the ,  Incision has a little crack and bubble on the side of it where it is bleeding from.  ,  Advise to be very careful and apply pressure with sterile gauze on the way to hospital  \  Care Advice ED now.  Call 911 for any worsening symptoms.    Reason for Disposition   Incision gaping open and < 2 days (48 hours) since wound re-opened    Additional Information   Negative: Major abdominal surgical incision and wound gaping open with visible internal organs   Negative: Sounds like a life-threatening emergency to the triager   Negative: Bleeding from incision and won't stop after 10 minutes of direct pressure   Negative: Widespread rash and bright red, sunburn-like   Negative: Severe pain in the incision    Protocols used: POST-OP INCISION SYMPTOMS AND [id number]

## 2021-01-01 ENCOUNTER — TELEPHONE (OUTPATIENT)
Dept: TRANSPLANT | Facility: CLINIC | Age: 31
End: 2021-01-01

## 2021-01-01 ENCOUNTER — TELEPHONE (OUTPATIENT)
Dept: TRANSPLANT | Facility: CLINIC | Age: 31
End: 2021-01-01
Payer: MEDICARE

## 2021-01-01 ENCOUNTER — HOSPITAL ENCOUNTER (OUTPATIENT)
Dept: RADIOLOGY | Facility: HOSPITAL | Age: 31
Discharge: HOME | End: 2021-08-10
Attending: NURSE PRACTITIONER
Payer: MEDICARE

## 2021-01-01 ENCOUNTER — PATIENT MESSAGE (OUTPATIENT)
Dept: RESEARCH | Facility: HOSPITAL | Age: 31
End: 2021-01-01

## 2021-01-01 ENCOUNTER — OFFICE VISIT (OUTPATIENT)
Dept: SURGERY | Facility: CLINIC | Age: 31
End: 2021-01-01
Payer: MEDICARE

## 2021-01-01 ENCOUNTER — OFFICE VISIT (OUTPATIENT)
Dept: TRANSPLANT | Facility: CLINIC | Age: 31
End: 2021-01-01
Attending: NURSE PRACTITIONER
Payer: MEDICARE

## 2021-01-01 ENCOUNTER — TELEPHONE (OUTPATIENT)
Dept: CARDIOLOGY | Facility: CLINIC | Age: 31
End: 2021-01-01

## 2021-01-01 ENCOUNTER — CLINICAL SUPPORT (OUTPATIENT)
Dept: SURGERY | Facility: CLINIC | Age: 31
End: 2021-01-01
Payer: MEDICARE

## 2021-01-01 ENCOUNTER — OUTSIDE PLACE OF SERVICE (OUTPATIENT)
Dept: SURGERY | Facility: CLINIC | Age: 31
End: 2021-01-01
Payer: MEDICARE

## 2021-01-01 ENCOUNTER — HISTORICAL (OUTPATIENT)
Dept: ADMINISTRATIVE | Facility: HOSPITAL | Age: 31
End: 2021-01-01

## 2021-01-01 ENCOUNTER — EPISODE CHANGES (OUTPATIENT)
Dept: TRANSPLANT | Facility: CLINIC | Age: 31
End: 2021-01-01

## 2021-01-01 ENCOUNTER — HOSPITAL ENCOUNTER (OUTPATIENT)
Dept: CARDIOLOGY | Facility: HOSPITAL | Age: 31
Discharge: HOME | End: 2021-08-10
Attending: NURSE PRACTITIONER
Payer: MEDICARE

## 2021-01-01 ENCOUNTER — OFFICE VISIT (OUTPATIENT)
Dept: TRANSPLANT | Facility: CLINIC | Age: 31
End: 2021-01-01
Payer: MEDICARE

## 2021-01-01 ENCOUNTER — PATIENT MESSAGE (OUTPATIENT)
Dept: SURGERY | Facility: CLINIC | Age: 31
End: 2021-01-01

## 2021-01-01 ENCOUNTER — OUTSIDE PLACE OF SERVICE (OUTPATIENT)
Dept: ADMINISTRATIVE | Facility: OTHER | Age: 31
End: 2021-01-01
Payer: MEDICARE

## 2021-01-01 ENCOUNTER — PATIENT MESSAGE (OUTPATIENT)
Dept: TRANSPLANT | Facility: CLINIC | Age: 31
End: 2021-01-01

## 2021-01-01 VITALS
BODY MASS INDEX: 26.37 KG/M2 | DIASTOLIC BLOOD PRESSURE: 80 MMHG | BODY MASS INDEX: 26.37 KG/M2 | HEIGHT: 67 IN | WEIGHT: 168 LBS | HEIGHT: 67 IN | SYSTOLIC BLOOD PRESSURE: 116 MMHG | HEART RATE: 84 BPM | WEIGHT: 168 LBS

## 2021-01-01 VITALS
HEIGHT: 67 IN | WEIGHT: 168.19 LBS | HEART RATE: 103 BPM | DIASTOLIC BLOOD PRESSURE: 89 MMHG | SYSTOLIC BLOOD PRESSURE: 137 MMHG | RESPIRATION RATE: 18 BRPM | TEMPERATURE: 99 F | BODY MASS INDEX: 26.4 KG/M2 | OXYGEN SATURATION: 95 %

## 2021-01-01 VITALS
WEIGHT: 165.13 LBS | HEART RATE: 96 BPM | SYSTOLIC BLOOD PRESSURE: 124 MMHG | HEIGHT: 67 IN | TEMPERATURE: 97 F | OXYGEN SATURATION: 98 % | RESPIRATION RATE: 16 BRPM | DIASTOLIC BLOOD PRESSURE: 75 MMHG | BODY MASS INDEX: 25.92 KG/M2

## 2021-01-01 DIAGNOSIS — D57.1 SICKLE CELL DISEASE WITHOUT CRISIS: ICD-10-CM

## 2021-01-01 DIAGNOSIS — Z76.82 ORGAN TRANSPLANT CANDIDATE: Primary | ICD-10-CM

## 2021-01-01 DIAGNOSIS — D63.1 ANEMIA SECONDARY TO RENAL FAILURE: ICD-10-CM

## 2021-01-01 DIAGNOSIS — Z76.82 PATIENT ON WAITING LIST FOR KIDNEY TRANSPLANT: Primary | ICD-10-CM

## 2021-01-01 DIAGNOSIS — Z76.82 ORGAN TRANSPLANT CANDIDATE: ICD-10-CM

## 2021-01-01 DIAGNOSIS — N25.81 SECONDARY HYPERPARATHYROIDISM: ICD-10-CM

## 2021-01-01 DIAGNOSIS — N18.6 ESRD ON HEMODIALYSIS: ICD-10-CM

## 2021-01-01 DIAGNOSIS — Z86.73 HISTORY OF STROKE: ICD-10-CM

## 2021-01-01 DIAGNOSIS — D57.1 HB-SS DISEASE WITHOUT CRISIS: ICD-10-CM

## 2021-01-01 DIAGNOSIS — N18.9 ANEMIA SECONDARY TO RENAL FAILURE: ICD-10-CM

## 2021-01-01 DIAGNOSIS — N18.6 END STAGE RENAL DISEASE: Primary | ICD-10-CM

## 2021-01-01 DIAGNOSIS — Z98.890 POST-OPERATIVE STATE: Primary | ICD-10-CM

## 2021-01-01 DIAGNOSIS — Z99.2 ESRD ON HEMODIALYSIS: ICD-10-CM

## 2021-01-01 DIAGNOSIS — N17.0 ACUTE RENAL FAILURE WITH ACUTE TUBULAR NECROSIS SUPERIMPOSED ON STAGE 4 CHRONIC KIDNEY DISEASE: ICD-10-CM

## 2021-01-01 DIAGNOSIS — D72.829 LEUKOCYTOSIS, UNSPECIFIED TYPE: ICD-10-CM

## 2021-01-01 DIAGNOSIS — N18.4 ACUTE RENAL FAILURE WITH ACUTE TUBULAR NECROSIS SUPERIMPOSED ON STAGE 4 CHRONIC KIDNEY DISEASE: ICD-10-CM

## 2021-01-01 LAB
ABS NEUT (OLG): 7.13 X10(3)/MCL (ref 2.1–9.2)
ANION GAP SERPL CALC-SCNC: 11 MMOL/L (ref 3–11)
ANION GAP SERPL CALC-SCNC: 11 MMOL/L (ref 3–11)
ANION GAP SERPL CALC-SCNC: 6 MMOL/L (ref 3–11)
ANISOCYTOSIS BLD QL SMEAR: 2
ANISOCYTOSIS: ABNORMAL
ANISOCYTOSIS: ABNORMAL
ASCENDING AORTA: 2.63 CM
AV INDEX (PROSTH): 0.73
AV MEAN GRADIENT: 3 MMHG
AV PEAK GRADIENT: 6 MMHG
AV VALVE AREA: 2.8 CM2
AV VELOCITY RATIO: 0.76
BANDS: 2 % (ref 0–5)
BASOPHILS # BLD AUTO: 0.1 X10(3)/MCL (ref 0–0.2)
BASOPHILS NFR BLD AUTO: 1 %
BSA FOR ECHO PROCEDURE: 1.9 M2
BUN SERPL-MCNC: 26 MG/DL (ref 7–18)
BUN SERPL-MCNC: 29.3 MG/DL (ref 8.9–20.6)
BUN SERPL-MCNC: 34.9 MG/DL (ref 8.9–20.6)
BUN SERPL-MCNC: 36 MG/DL (ref 7–18)
BUN SERPL-MCNC: 66 MG/DL (ref 7–18)
BUN/CREAT SERPL: 5.29 RATIO (ref 7–18)
BUN/CREAT SERPL: 7 RATIO (ref 7–18)
BUN/CREAT SERPL: 7.13 RATIO (ref 7–18)
BURR CELLS BLD QL SMEAR: 1 (ref 0–0)
CALCIUM BLD-MCNC: NEGATIVE MG/DL
CALCIUM BLD-MCNC: NEGATIVE MG/DL
CALCIUM SERPL-MCNC: 8.4 MG/DL (ref 8.8–10.5)
CALCIUM SERPL-MCNC: 8.9 MG/DL (ref 8.4–10.2)
CALCIUM SERPL-MCNC: 9.1 MG/DL (ref 8.8–10.5)
CALCIUM SERPL-MCNC: 9.6 MG/DL (ref 8.8–10.5)
CALCIUM SERPL-MCNC: 9.9 MG/DL (ref 8.4–10.2)
CELLS COUNTED: 100
CELLS COUNTED: 100
CHLORIDE SERPL-SCNC: 100 MMOL/L (ref 100–108)
CHLORIDE SERPL-SCNC: 101 MMOL/L (ref 100–108)
CHLORIDE SERPL-SCNC: 101 MMOL/L (ref 100–108)
CHLORIDE SERPL-SCNC: 96 MMOL/L (ref 98–107)
CHLORIDE SERPL-SCNC: 99 MMOL/L (ref 98–107)
CO2 SERPL-SCNC: 25 MMOL/L (ref 21–32)
CO2 SERPL-SCNC: 26 MMOL/L (ref 22–29)
CO2 SERPL-SCNC: 30 MMOL/L (ref 21–32)
CO2 SERPL-SCNC: 31 MMOL/L (ref 21–32)
CO2 SERPL-SCNC: 32 MMOL/L (ref 22–29)
COVID-19 AB, IGM: NEGATIVE
COVID-19 AB, IGM: NEGATIVE
CREAT SERPL-MCNC: 4.91 MG/DL (ref 0.7–1.3)
CREAT SERPL-MCNC: 4.95 MG/DL (ref 0.73–1.18)
CREAT SERPL-MCNC: 5.14 MG/DL (ref 0.7–1.3)
CREAT SERPL-MCNC: 5.73 MG/DL (ref 0.73–1.18)
CREAT SERPL-MCNC: 9.25 MG/DL (ref 0.7–1.3)
CREAT/UREA NIT SERPL: 6
CREAT/UREA NIT SERPL: 6
CV ECHO LV RWT: 0.33 CM
CV PHARM DOSE: 0.4 MG
CV STRESS BASE HR: 86 BPM
DIASTOLIC BLOOD PRESSURE: 76 MMHG
DOP CALC AO PEAK VEL: 1.27 M/S
DOP CALC AO VTI: 26.44 CM
DOP CALC LVOT AREA: 3.8 CM2
DOP CALC LVOT DIAMETER: 2.21 CM
DOP CALC LVOT PEAK VEL: 0.97 M/S
DOP CALC LVOT STROKE VOLUME: 74.15 CM3
DOP CALCLVOT PEAK VEL VTI: 19.34 CM
E WAVE DECELERATION TIME: 196.67 MSEC
E/A RATIO: 2.11
E/E' RATIO: 14.5 M/S
ECHO LV POSTERIOR WALL: 0.79 CM (ref 0.6–1.1)
EJECTION FRACTION- HIGH: 65 %
EJECTION FRACTION: 65 %
END DIASTOLIC INDEX-HIGH: 153 ML/M2
END DIASTOLIC INDEX-LOW: 93 ML/M2
END SYSTOLIC INDEX-HIGH: 71 ML/M2
END SYSTOLIC INDEX-LOW: 31 ML/M2
EOSINOPHIL # BLD AUTO: 0.4 X10(3)/MCL (ref 0–0.9)
EOSINOPHIL NFR BLD AUTO: 3 %
EOSINOPHIL NFR BLD: 4 % (ref 1–3)
ERYTHROCYTE [DISTWIDTH] IN BLOOD BY AUTOMATED COUNT: 17.8 % (ref 11.5–17)
ERYTHROCYTE [DISTWIDTH] IN BLOOD BY AUTOMATED COUNT: 18.3 % (ref 12.5–18)
ERYTHROCYTE [DISTWIDTH] IN BLOOD BY AUTOMATED COUNT: 18.3 % (ref 12.5–18)
ERYTHROCYTE [DISTWIDTH] IN BLOOD BY AUTOMATED COUNT: 20.8 % (ref 11.5–17)
ERYTHROCYTE [DISTWIDTH] IN BLOOD BY AUTOMATED COUNT: 22.8 % (ref 12.5–18)
EST CREAT CLEARANCE SER (OHS): 17.42 ML/MIN
FRACTIONAL SHORTENING: 34 % (ref 28–44)
GFR ESTIMATION: 16
GFR ESTIMATION: 17
GFR ESTIMATION: 8
GLUCOSE SERPL-MCNC: 100 MG/DL (ref 70–110)
GLUCOSE SERPL-MCNC: 111 MG/DL (ref 70–110)
GLUCOSE SERPL-MCNC: 82 MG/DL (ref 74–100)
GLUCOSE SERPL-MCNC: 89 MG/DL (ref 74–100)
GLUCOSE SERPL-MCNC: 98 MG/DL (ref 70–110)
HCT VFR BLD AUTO: 19.7 % (ref 42–52)
HCT VFR BLD AUTO: 22.3 % (ref 42–52)
HCT VFR BLD AUTO: 27.7 % (ref 42–52)
HCT VFR BLD AUTO: 28.5 % (ref 42–52)
HCT VFR BLD AUTO: 28.7 % (ref 42–52)
HGB BLD-MCNC: 6.3 G/DL (ref 14–18)
HGB BLD-MCNC: 7.4 GM/DL (ref 14–18)
HGB BLD-MCNC: 9 G/DL (ref 14–18)
HGB BLD-MCNC: 9.1 G/DL (ref 14–18)
HGB BLD-MCNC: 9.7 GM/DL (ref 14–18)
HYPOCHROMIA BLD QL SMEAR: ABNORMAL
INTERVENTRICULAR SEPTUM: 0.73 CM (ref 0.6–1.1)
IVRT: 62.8 MSEC
LA MAJOR: 6.14 CM
LA MINOR: 6.06 CM
LA WIDTH: 4.06 CM
LEFT ATRIUM SIZE: 3.25 CM
LEFT ATRIUM VOLUME INDEX MOD: 30.4 ML/M2
LEFT ATRIUM VOLUME INDEX: 36.4 ML/M2
LEFT ATRIUM VOLUME MOD: 57.22 CM3
LEFT ATRIUM VOLUME: 68.41 CM3
LEFT INTERNAL DIMENSION IN SYSTOLE: 3.17 CM (ref 2.1–4)
LEFT VENTRICLE DIASTOLIC VOLUME INDEX: 57.21 ML/M2
LEFT VENTRICLE DIASTOLIC VOLUME: 107.56 ML
LEFT VENTRICLE MASS INDEX: 63 G/M2
LEFT VENTRICLE SYSTOLIC VOLUME INDEX: 21.2 ML/M2
LEFT VENTRICLE SYSTOLIC VOLUME: 39.89 ML
LEFT VENTRICULAR INTERNAL DIMENSION IN DIASTOLE: 4.8 CM (ref 3.5–6)
LEFT VENTRICULAR MASS: 118.61 G
LV LATERAL E/E' RATIO: 12.89 M/S
LV SEPTAL E/E' RATIO: 16.57 M/S
LYMPHOCYTES # BLD AUTO: 4.7 X10(3)/MCL (ref 0.6–4.6)
LYMPHOCYTES NFR BLD AUTO: 33 %
LYMPHOCYTES NFR BLD: 16 % (ref 25–40)
LYMPHOCYTES NFR BLD: 30 % (ref 25–40)
MACROCYTES BLD QL SMEAR: 1 /MCL
MCH RBC QN AUTO: 29.2 PG (ref 27–31.2)
MCH RBC QN AUTO: 29.8 PG (ref 27–31)
MCH RBC QN AUTO: 29.8 PG (ref 27–31.2)
MCH RBC QN AUTO: 31.1 PG (ref 27–31)
MCH RBC QN AUTO: 31.6 PG (ref 27–31.2)
MCHC RBC AUTO-ENTMCNC: 31.7 G/DL (ref 31.8–35.4)
MCHC RBC AUTO-ENTMCNC: 32 G/DL (ref 31.8–35.4)
MCHC RBC AUTO-ENTMCNC: 32.5 G/DL (ref 31.8–35.4)
MCHC RBC AUTO-ENTMCNC: 33.2 GM/DL (ref 33–36)
MCHC RBC AUTO-ENTMCNC: 34 GM/DL (ref 33–36)
MCV RBC AUTO: 89.9 FL (ref 80–94)
MCV RBC AUTO: 91.2 FL (ref 80–97)
MCV RBC AUTO: 91.3 FL (ref 80–94)
MCV RBC AUTO: 91.7 FL (ref 80–97)
MCV RBC AUTO: 99.7 FL (ref 80–97)
MONOCYTES # BLD AUTO: 1.9 X10(3)/MCL (ref 0.1–1.3)
MONOCYTES NFR BLD AUTO: 13 %
MONOCYTES NFR BLD: 5 % (ref 1–15)
MONOCYTES NFR BLD: 8 % (ref 1–15)
MV A" WAVE DURATION": 7.42 MSEC
MV PEAK A VEL: 0.55 M/S
MV PEAK E VEL: 1.16 M/S
MV STENOSIS PRESSURE HALF TIME: 57.03 MS
MV VALVE AREA P 1/2 METHOD: 3.86 CM2
NEUTROPHILS # BLD AUTO: 13 10*3/UL (ref 1.8–7.7)
NEUTROPHILS # BLD AUTO: 25.5 10*3/UL (ref 1.8–7.7)
NEUTROPHILS # BLD AUTO: 7.13 X10(3)/MCL (ref 2.1–9.2)
NEUTROPHILS NFR BLD AUTO: 50 %
NEUTROPHILS NFR BLD: 59 % (ref 37–80)
NEUTROPHILS NFR BLD: 76 % (ref 37–80)
NUC REST DIASTOLIC VOLUME INDEX: 143
NUC REST EJECTION FRACTION: 64
NUC REST SYSTOLIC VOLUME INDEX: 51
NUC STRESS DIASTOLIC VOLUME INDEX: 126
NUC STRESS EJECTION FRACTION: 72 %
NUC STRESS SYSTOLIC VOLUME INDEX: 35
NUCLEATED RED BLOOD CELLS: 0.6 %
NUCLEATED RED BLOOD CELLS: 22 %
NUCLEATED RED BLOOD CELLS: 9.2 %
OHS CV CPX 1 MINUTE RECOVERY HEART RATE: 107 BPM
OHS CV CPX 85 PERCENT MAX PREDICTED HEART RATE MALE: 161
OHS CV CPX MAX PREDICTED HEART RATE: 189
OHS CV CPX PATIENT IS FEMALE: 0
OHS CV CPX PATIENT IS MALE: 1
OHS CV CPX PEAK DIASTOLIC BLOOD PRESSURE: 76 MMHG
OHS CV CPX PEAK HEAR RATE: 85 BPM
OHS CV CPX PEAK RATE PRESSURE PRODUCT: NORMAL
OHS CV CPX PEAK SYSTOLIC BLOOD PRESSURE: 126 MMHG
OHS CV CPX PERCENT MAX PREDICTED HEART RATE ACHIEVED: 45
OHS CV CPX RATE PRESSURE PRODUCT PRESENTING: NORMAL
PATIENT EMPLOYED IN HEALTHCARE: NO
PATIENT HAS SYMPTOMS FOR CONDITION OF INTEREST: NO
PATIENT HOSPITALIZED BC COND: NO
PATIENT IN CONGREGATE CARE: NO
PISA TR MAX VEL: 2.8 M/S
PLATELET # BLD AUTO: 141 X10(3)/MCL (ref 130–400)
PLATELET # BLD AUTO: 151 X10(3)/MCL (ref 130–400)
PLATELET # BLD EST: NORMAL 10*3/UL
PLATELETS: 120 10*3/UL (ref 142–424)
PLATELETS: 207 10*3/UL (ref 142–424)
PLATELETS: 214 10*3/UL (ref 142–424)
PMV BLD AUTO: 11.5 FL (ref 7.4–10.4)
PMV BLD AUTO: 13 FL (ref 9.4–12.4)
POIKILOCYTOSIS BLD QL SMEAR: 1
POIKILOCYTOSIS: ABNORMAL
POLYCHROMASIA BLD QL SMEAR: 1
POTASSIUM SERPL-SCNC: 3.3 MMOL/L (ref 3.6–5.2)
POTASSIUM SERPL-SCNC: 3.3 MMOL/L (ref 3.6–5.2)
POTASSIUM SERPL-SCNC: 3.5 MMOL/L (ref 3.6–5.2)
POTASSIUM SERPL-SCNC: 3.7 MMOL/L (ref 3.5–5.1)
POTASSIUM SERPL-SCNC: 3.7 MMOL/L (ref 3.5–5.1)
POTASSIUM SERPL-SCNC: 4.9 MMOL/L (ref 3.6–5.2)
PULM VEIN S/D RATIO: 0.79
PV PEAK D VEL: 0.47 M/S
PV PEAK S VEL: 0.37 M/S
RA MAJOR: 5.65 CM
RA PRESSURE: 3 MMHG
RA WIDTH: 4.6 CM
RBC # BLD AUTO: 2.16 10*6/UL (ref 4.7–6.1)
RBC # BLD AUTO: 2.48 X10(6)/MCL (ref 4.7–6.1)
RBC # BLD AUTO: 2.88 10*6/UL (ref 4.7–6.1)
RBC # BLD AUTO: 3.02 10*6/UL (ref 4.7–6.1)
RBC # BLD AUTO: 3.12 X10(6)/MCL (ref 4.7–6.1)
RBC MORPH BLD: ABNORMAL
RETIRED EF AND QEF - SEE NOTES: 53 %
RIGHT VENTRICULAR END-DIASTOLIC DIMENSION: 3.59 CM
RV TISSUE DOPPLER FREE WALL SYSTOLIC VELOCITY 1 (APICAL 4 CHAMBER VIEW): 15.78 CM/S
SICKLE CELLS: ABNORMAL
SINUS: 3.41 CM
SMALL PLATELETS BLD QL SMEAR: ADEQUATE
SMALL PLATELETS BLD QL SMEAR: NORMAL
SODIUM BLD-SCNC: 137 MMOL/L (ref 135–145)
SODIUM BLD-SCNC: 138 MMOL/L (ref 135–145)
SODIUM BLD-SCNC: 141 MMOL/L (ref 135–145)
SODIUM SERPL-SCNC: 138 MMOL/L (ref 136–145)
SODIUM SERPL-SCNC: 138 MMOL/L (ref 136–145)
STJ: 2.58 CM
SYSTOLIC BLOOD PRESSURE: 126 MMHG
TARGETS BLD QL SMEAR: 1
TARGETS: ABNORMAL
TDI LATERAL: 0.09 M/S
TDI SEPTAL: 0.07 M/S
TDI: 0.08 M/S
TR MAX PG: 31 MMHG
TRICUSPID ANNULAR PLANE SYSTOLIC EXCURSION: 2.3 CM
TV REST PULMONARY ARTERY PRESSURE: 34 MMHG
WBC # BLD: 14.9 10*3/UL (ref 4.6–10.2)
WBC # BLD: 21.3 10*3/UL (ref 4.6–10.2)
WBC # BLD: 33.6 10*3/UL (ref 4.6–10.2)
WBC # SPEC AUTO: 11.6 X10(3)/MCL (ref 4.5–11.5)
WBC # SPEC AUTO: 14.4 X10(3)/MCL (ref 4.5–11.5)

## 2021-01-01 PROCEDURE — 99999 PR PBB SHADOW E&M-EST. PATIENT-LVL IV: CPT | Mod: PBBFAC,TXP,, | Performed by: NURSE PRACTITIONER

## 2021-01-01 PROCEDURE — 99999 PR PBB SHADOW E&M-EST. PATIENT-LVL IV: ICD-10-PCS | Mod: PBBFAC,TXP,, | Performed by: NURSE PRACTITIONER

## 2021-01-01 PROCEDURE — 99215 OFFICE O/P EST HI 40 MIN: CPT | Mod: S$PBB,TXP,, | Performed by: NURSE PRACTITIONER

## 2021-01-01 PROCEDURE — 63600175 PHARM REV CODE 636 W HCPCS: Mod: TXP | Performed by: NURSE PRACTITIONER

## 2021-01-01 PROCEDURE — 72170 XR PELVIS ROUTINE AP: ICD-10-PCS | Mod: 26,TXP,, | Performed by: RADIOLOGY

## 2021-01-01 PROCEDURE — 36590 PR REMOVAL TUNNELED CV CATH W SUBQ PORT OR PUMP: ICD-10-PCS | Mod: TXP,,, | Performed by: SURGERY

## 2021-01-01 PROCEDURE — 93306 TTE W/DOPPLER COMPLETE: CPT | Mod: 26,TXP,, | Performed by: INTERNAL MEDICINE

## 2021-01-01 PROCEDURE — 71046 X-RAY EXAM CHEST 2 VIEWS: CPT | Mod: TC,TXP

## 2021-01-01 PROCEDURE — 78452 STRESS TEST WITH MYOCARDIAL PERFUSION (CUPID ONLY): ICD-10-PCS | Mod: 26,TXP,, | Performed by: INTERNAL MEDICINE

## 2021-01-01 PROCEDURE — 78452 HT MUSCLE IMAGE SPECT MULT: CPT | Mod: 26,TXP,, | Performed by: INTERNAL MEDICINE

## 2021-01-01 PROCEDURE — 99214 OFFICE O/P EST MOD 30 MIN: CPT | Mod: PBBFAC,TXP | Performed by: NURSE PRACTITIONER

## 2021-01-01 PROCEDURE — 99214 OFFICE O/P EST MOD 30 MIN: CPT | Mod: PBBFAC,25,TXP | Performed by: NURSE PRACTITIONER

## 2021-01-01 PROCEDURE — 76770 US RETROPERITONEAL COMPLETE: ICD-10-PCS | Mod: 26,TXP,, | Performed by: RADIOLOGY

## 2021-01-01 PROCEDURE — 49999 UNLISTED PX ABD PERTM&OMN: CPT | Mod: TXP,,, | Performed by: SURGERY

## 2021-01-01 PROCEDURE — 71046 X-RAY EXAM CHEST 2 VIEWS: CPT | Mod: 26,TXP,, | Performed by: RADIOLOGY

## 2021-01-01 PROCEDURE — 93018 STRESS TEST WITH MYOCARDIAL PERFUSION (CUPID ONLY): ICD-10-PCS | Mod: TXP,,, | Performed by: INTERNAL MEDICINE

## 2021-01-01 PROCEDURE — 99215 PR OFFICE/OUTPT VISIT, EST, LEVL V, 40-54 MIN: ICD-10-PCS | Mod: S$PBB,TXP,, | Performed by: NURSE PRACTITIONER

## 2021-01-01 PROCEDURE — 72170 X-RAY EXAM OF PELVIS: CPT | Mod: TC,TXP

## 2021-01-01 PROCEDURE — 99024 POSTOP FOLLOW-UP VISIT: CPT | Mod: NTX,S$GLB,POP, | Performed by: SURGERY

## 2021-01-01 PROCEDURE — 76770 US EXAM ABDO BACK WALL COMP: CPT | Mod: TC,TXP

## 2021-01-01 PROCEDURE — 99024 PR POST-OP FOLLOW-UP VISIT: ICD-10-PCS | Mod: NTX,S$GLB,POP, | Performed by: SURGERY

## 2021-01-01 PROCEDURE — 93016 CV STRESS TEST SUPVJ ONLY: CPT | Mod: TXP,,, | Performed by: INTERNAL MEDICINE

## 2021-01-01 PROCEDURE — 49422 PR REMOVAL TUNNELED INTRAPERITONEAL CATHETER: ICD-10-PCS | Mod: ,,, | Performed by: SURGERY

## 2021-01-01 PROCEDURE — 93306 ECHO (CUPID ONLY): ICD-10-PCS | Mod: 26,TXP,, | Performed by: INTERNAL MEDICINE

## 2021-01-01 PROCEDURE — 49999: ICD-10-PCS | Mod: TXP,,, | Performed by: SURGERY

## 2021-01-01 PROCEDURE — 93016 STRESS TEST WITH MYOCARDIAL PERFUSION (CUPID ONLY): ICD-10-PCS | Mod: TXP,,, | Performed by: INTERNAL MEDICINE

## 2021-01-01 PROCEDURE — 36590 REMOVAL TUNNELED CV CATH: CPT | Mod: TXP,,, | Performed by: SURGERY

## 2021-01-01 PROCEDURE — 93017 CV STRESS TEST TRACING ONLY: CPT | Mod: TXP

## 2021-01-01 PROCEDURE — A9502 TC99M TETROFOSMIN: HCPCS | Mod: TXP

## 2021-01-01 PROCEDURE — 72170 X-RAY EXAM OF PELVIS: CPT | Mod: 26,TXP,, | Performed by: RADIOLOGY

## 2021-01-01 PROCEDURE — 49422 REMOVE TUNNELED IP CATH: CPT | Mod: ,,, | Performed by: SURGERY

## 2021-01-01 PROCEDURE — 93018 CV STRESS TEST I&R ONLY: CPT | Mod: TXP,,, | Performed by: INTERNAL MEDICINE

## 2021-01-01 PROCEDURE — 76770 US EXAM ABDO BACK WALL COMP: CPT | Mod: 26,TXP,, | Performed by: RADIOLOGY

## 2021-01-01 PROCEDURE — 71046 XR CHEST PA AND LATERAL: ICD-10-PCS | Mod: 26,TXP,, | Performed by: RADIOLOGY

## 2021-01-01 PROCEDURE — 93306 TTE W/DOPPLER COMPLETE: CPT | Mod: TXP

## 2021-01-01 RX ORDER — AMOXICILLIN AND CLAVULANATE POTASSIUM 875; 125 MG/1; MG/1
1 TABLET, FILM COATED ORAL 2 TIMES DAILY
COMMUNITY
Start: 2021-01-01

## 2021-01-01 RX ORDER — OFLOXACIN 3 MG/ML
SOLUTION AURICULAR (OTIC)
COMMUNITY
Start: 2021-01-01

## 2021-01-01 RX ORDER — REGADENOSON 0.08 MG/ML
0.4 INJECTION, SOLUTION INTRAVENOUS
Status: COMPLETED | OUTPATIENT
Start: 2021-01-01 | End: 2021-01-01

## 2021-01-01 RX ORDER — FLUTICASONE PROPIONATE 50 MCG
SPRAY, SUSPENSION (ML) NASAL
COMMUNITY
Start: 2021-01-01

## 2021-01-01 RX ADMIN — REGADENOSON 0.4 MG: 0.08 INJECTION, SOLUTION INTRAVENOUS at 10:08

## 2021-01-21 PROBLEM — N28.9 KIDNEY DISEASE: Status: RESOLVED | Noted: 2019-06-11 | Resolved: 2021-01-01

## 2021-01-21 PROBLEM — N18.4 CKD (CHRONIC KIDNEY DISEASE), STAGE IV: Status: RESOLVED | Noted: 2019-09-25 | Resolved: 2021-01-01

## 2022-02-09 ENCOUNTER — POST MORTEM DOCUMENTATION (OUTPATIENT)
Dept: TRANSPLANT | Facility: CLINIC | Age: 32
End: 2022-02-09
Payer: MEDICARE

## 2022-02-09 ENCOUNTER — TELEPHONE (OUTPATIENT)
Dept: TRANSPLANT | Facility: CLINIC | Age: 32
End: 2022-02-09
Payer: MEDICARE

## 2022-02-09 NOTE — TELEPHONE ENCOUNTER
----- Message from Master Turner sent at 2022 12:18 PM CST -----  Regarding: Patient   Contact: Dwaine  Patient's mom (Dwaine) calling to notify that the patient is .     D.O.D. 2021    Call: [phone]

## 2022-04-30 NOTE — OP NOTE
Patient:   Ambrosio Newman            MRN: [id number]            FIN: [id number]               Age:   31 years     Sex:  Male     :  1990   Associated Diagnoses:   ESRD on dialysis   Author:   Marialuisa Martin MD      Brief Operative Note   Operative Information   Date/ Time:  2021 08:27:00.     Preoperative Diagnosis: ESRD on dialysis (AVZ50-AR N18.6).     Postoperative Diagnosis: ESRD on dialysis ([id number] N18.6).     Procedures Performed: Right upper extremity dialysis graft placement.     Indications: Mr. Newman is a 32 y/o man with ESRD who needs long term dialysis access.   He has a known left innominate vein occlusion. .     Surgeon: Marialuisa Martin MD.     Esimated blood loss: loss less than  100  cc.     Description of Procedure/Findings/    Complications: The patient was taken to the OR and placed in a supine position on the operative table.  The right upper extremity was prepped and draped in the usual sterile fashion.   2 grams ancef infusion started prior to the incision.  Appropriate timeout was performed. B mode u/s was utilized to identify the brachial artery and axillary vein in the proximal upper arm.  Incision was made and dissection was performed through the subcutaneous tissues and fascia to expose the vascular bundle.   There was a high bifurcation and the radial and ulnar arteries were separate in our incision.  The deeper artery was a the ulnar artery and it was also the larger artery.  The radial artery appeared too small for graft placement.   The radial artery and associated nerve were gently retracted so we could gain access to the ulnar artery. The ulnar artery was isolated and controlled with a vessel loop.  On the of the veins adjacent to the radial artery was large and was also controlled with a vessel loop.  A 4 to 7 mm goretex graft was placed via subcutaneous tunnelling in loop configuration with assistance of a counterincision in the distal  upper arm.  4000 units of heparin was administered and a longitudinal incision was made on the ulnar artery.   Anastomosis was performed with a [place] CV6 suture.  Flow was restored through the brachial artery.  The distal end of the graft was spatulated and venotomy was made.   Another anastamosis with [place] CV-6  suture was performed.  Flow was restored to the system.  There was thrill to the graft and a palpable radial pulse.    Protamine was administered and hemostasis was obtained.  Wound was irrigated with antibiotic solution.   Both wounds were closed with 3-0 vicryl suture and 4-0 monocryl suture.   Dermabond dressing was placed.      .